# Patient Record
Sex: FEMALE | Race: BLACK OR AFRICAN AMERICAN | Employment: OTHER | RURAL
[De-identification: names, ages, dates, MRNs, and addresses within clinical notes are randomized per-mention and may not be internally consistent; named-entity substitution may affect disease eponyms.]

---

## 2017-01-31 DIAGNOSIS — I10 ESSENTIAL HYPERTENSION: ICD-10-CM

## 2017-01-31 RX ORDER — LISINOPRIL AND HYDROCHLOROTHIAZIDE 20; 25 MG/1; MG/1
1 TABLET ORAL DAILY
Qty: 90 TAB | Refills: 6 | Status: CANCELLED | OUTPATIENT
Start: 2017-01-31

## 2017-01-31 RX ORDER — LISINOPRIL AND HYDROCHLOROTHIAZIDE 20; 25 MG/1; MG/1
1 TABLET ORAL DAILY
Qty: 90 TAB | Refills: 1 | Status: SHIPPED | OUTPATIENT
Start: 2017-01-31 | End: 2017-08-22 | Stop reason: SDUPTHER

## 2017-01-31 NOTE — TELEPHONE ENCOUNTER
Can we get her an appt to f/u chronic conditions? She hasn't had cholesterol checked in over a year. Thanks!

## 2017-08-22 ENCOUNTER — OFFICE VISIT (OUTPATIENT)
Dept: FAMILY MEDICINE CLINIC | Age: 62
End: 2017-08-22

## 2017-08-22 VITALS
HEART RATE: 78 BPM | TEMPERATURE: 98 F | HEIGHT: 64 IN | RESPIRATION RATE: 20 BRPM | DIASTOLIC BLOOD PRESSURE: 78 MMHG | SYSTOLIC BLOOD PRESSURE: 129 MMHG | BODY MASS INDEX: 34.66 KG/M2 | OXYGEN SATURATION: 96 % | WEIGHT: 203 LBS

## 2017-08-22 DIAGNOSIS — M25.522 LEFT ELBOW PAIN: ICD-10-CM

## 2017-08-22 DIAGNOSIS — I10 ESSENTIAL HYPERTENSION: ICD-10-CM

## 2017-08-22 DIAGNOSIS — Z91.09 ENVIRONMENTAL ALLERGIES: Primary | ICD-10-CM

## 2017-08-22 RX ORDER — LISINOPRIL AND HYDROCHLOROTHIAZIDE 20; 25 MG/1; MG/1
1 TABLET ORAL DAILY
Qty: 90 TAB | Refills: 1 | Status: SHIPPED | OUTPATIENT
Start: 2017-08-22 | End: 2019-04-16 | Stop reason: SDUPTHER

## 2017-08-22 NOTE — MR AVS SNAPSHOT
Visit Information Date & Time Provider Department Dept. Phone Encounter #  
 8/22/2017 10:30 AM Marry Abel MD Thong Johnson [id number] Follow-up Instructions Return in about 6 months (around 2/22/2018) for HTN. Upcoming Health Maintenance Date Due Pneumococcal 19-64 Medium Risk (1 of 1 - PPSV23) 4/11/1973 DTaP/Tdap/Td series (1 - Tdap) 4/11/1975 ZOSTER VACCINE AGE 60> 2/11/2014 INFLUENZA AGE 9 TO ADULT 8/1/2017 BREAST CANCER SCRN MAMMOGRAM 5/24/2018 COLONOSCOPY 7/31/2022 Allergies as of 8/22/2017  Review Complete On: 8/22/2017 By: Jeremias Heath LPN Severity Noted Reaction Type Reactions Percocet [Oxycodone-acetaminophen]  02/14/2006    Other (comments)  
 felt like \"out of body experience\" Percodan [Oxycodone Hcl-oxycodone-asa]  05/11/2010    Other (comments)  
 felt like \"out of body experience\" Current Immunizations  Reviewed on 1/6/2015 Name Date Influenza Vaccine (Quad) PF 12/1/2015, 12/5/2014 Influenza Vaccine Split 10/26/2012, 10/31/2011, 1/24/2011 Not reviewed this visit You Were Diagnosed With   
  
 Codes Comments Environmental allergies    -  Primary ICD-10-CM: Z91.09 
ICD-9-CM: V15.09 Left elbow pain     ICD-10-CM: Q70.397 ICD-9-CM: 719.42 Essential hypertension     ICD-10-CM: I10 
ICD-9-CM: 401.9 Vitals BP Pulse Temp Resp Height(growth percentile) Weight(growth percentile) 129/78 (BP 1 Location: Left arm, BP Patient Position: Sitting) 78 98 °F (36.7 °C) (Oral) 20 5' 4\" (1.626 m) 203 lb (92.1 kg) LMP SpO2 BMI OB Status Smoking Status 01/01/1975 96% 34.84 kg/m2 Hysterectomy Former Smoker Vitals History BMI and BSA Data Body Mass Index Body Surface Area 34.84 kg/m 2 2.04 m 2 Preferred Pharmacy Pharmacy Name Phone [facility] [address] [place] [address] Wall  [phone] Your Updated Medication List  
  
   
This list is accurate as of: 8/22/17 10:52 AM.  Always use your most recent med list.  
  
  
  
  
 * albuterol 2.5 mg /3 mL (0.083 %) nebulizer solution Commonly known as:  PROVENTIL VENTOLIN  
3 mL by Nebulization route every four (4) hours as needed for Wheezing. * albuterol 90 mcg/actuation inhaler Commonly known as:  PROAIR HFA Take 1 Puff by inhalation every four (4) hours as needed. fluticasone 27.5 mcg/actuation nasal spray Commonly known as:  VERAMYST  
2 Sprays by Nasal route daily. * gabapentin 300 mg capsule Commonly known as:  NEURONTIN Take 300 mg by mouth two (2) times a day. * gabapentin 100 mg capsule Commonly known as:  NEURONTIN Take  by mouth three (3) times daily. lisinopril-hydroCHLOROthiazide 20-25 mg per tablet Commonly known as:  Amanda Manuel Take 1 Tab by mouth daily. loratadine 10 mg tablet Commonly known as:  [place] Take 1 tablet by mouth daily. MOBIC 15 mg tablet Generic drug:  meloxicam  
Take 15 mg by mouth daily. Nebulizer & Compressor machine  
by Does Not Apply route. q4 hours with medication  
  
 sodium chloride 0.65 % Drop Commonly known as:  AYR SALINE  
2 Drops by Both Nostrils route every two (2) hours as needed. Use before flonase  
  
 traMADol 50 mg tablet Commonly known as:  ULTRAM  
Take 50 mg by mouth every six (6) hours as needed for Pain. VITAMIN B-12 500 mcg tablet Generic drug:  cyanocobalamin Take 500 mcg by mouth daily. vitamin E 400 unit capsule Commonly known as:  [address] Take  by mouth daily. * Notice: This list has 4 medication(s) that are the same as other medications prescribed for you. Read the directions carefully, and ask your doctor or other care provider to review them with you. Prescriptions Sent to Pharmacy  Refills  
 sodium chloride (AYR SALINE) 0.65 % drop 0  
 Si Drops by Both Nostrils route every two (2) hours as needed. Use before flonase Class: Normal  
 Pharmacy: [id number] [place] [address] Ph #: [phone] Route: Both Nostrils  
 lisinopril-hydroCHLOROthiazide (PRINZIDE, ZESTORETIC) 20-25 mg per tablet 1 Sig: Take 1 Tab by mouth daily. Class: Normal  
 Pharmacy: [id number] [place] [address] Ph #: [phone] Route: Oral  
  
We Performed the Following METABOLIC PANEL, BASIC [57510 CPT(R)] Follow-up Instructions Return in about 6 months (around 2018) for HTN. Patient Instructions A Healthy Lifestyle: Care Instructions Your Care Instructions A healthy lifestyle can help you feel good, stay at a healthy weight, and have plenty of energy for both work and play. A healthy lifestyle is something you can share with your whole family. A healthy lifestyle also can lower your risk for serious health problems, such as high blood pressure, heart disease, and diabetes. You can follow a few steps listed below to improve your health and the health of your family. Follow-up care is a key part of your treatment and safety. Be sure to make and go to all appointments, and call your doctor if you are having problems. Its also a good idea to know your test results and keep a list of the medicines you take. How can you care for yourself at home? · Do not eat too much sugar, fat, or fast foods. You can still have dessert and treats now and then. The goal is moderation. · Start small to improve your eating habits. Pay attention to portion sizes, drink less juice and soda pop, and eat more fruits and vegetables. ¨ Eat a healthy amount of food. A 3-ounce serving of meat, for example, is about the size of a deck of cards. Fill the rest of your plate with vegetables and whole grains. ¨ Limit the amount of soda and sports drinks you have every day.  Drink more water when you are thirsty. ¨ Eat at least 5 servings of fruits and vegetables every day. It may seem like a lot, but it is not hard to reach this goal. A serving or helping is 1 piece of fruit, 1 cup of vegetables, or 2 cups of leafy, raw vegetables. Have an apple or some carrot sticks as an afternoon snack instead of a candy bar. Try to have fruits and/or vegetables at every meal. 
· Make exercise part of your daily routine. You may want to start with simple activities, such as walking, bicycling, or slow swimming. Try to be active 30 to 60 minutes every day. You do not need to do all 30 to 60 minutes all at once. For example, you can exercise 3 times a day for 10 or 20 minutes. Moderate exercise is safe for most people, but it is always a good idea to talk to your doctor before starting an exercise program. 
· Keep moving. Al Mcdonough the lawn, work in the garden, or [employer] Take the stairs instead of the elevator at work. · If you smoke, quit. People who smoke have an increased risk for heart attack, stroke, cancer, and other lung illnesses. Quitting is hard, but there are ways to boost your chance of quitting tobacco for good. ¨ Use nicotine gum, patches, or lozenges. ¨ Ask your doctor about stop-smoking programs and medicines. ¨ Keep trying. In addition to reducing your risk of diseases in the future, you will notice some benefits soon after you stop using tobacco. If you have shortness of breath or asthma symptoms, they will likely get better within a few weeks after you quit. · Limit how much alcohol you drink. Moderate amounts of alcohol (up to 2 drinks a day for men, 1 drink a day for women) are okay. But drinking too much can lead to liver problems, high blood pressure, and other health problems. Family health If you have a family, there are many things you can do together to improve your health. · Eat meals together as a family as often as possible. · Eat healthy foods. This includes fruits, vegetables, lean meats and dairy, and whole grains. · Include your family in your fitness plan. Most people think of activities such as jogging or tennis as the way to fitness, but there are many ways you and your family can be more active. Anything that makes you breathe hard and gets your heart pumping is exercise. Here are some tips: 
¨ Walk to do errands or to take your child to school or the bus. ¨ Go for a family bike ride after dinner instead of watching TV. Where can you learn more? Go to http://rafael-vern.info/. Enter U210 in the search box to learn more about \"A Healthy Lifestyle: Care Instructions. \" Current as of: July 26, 2016 Content Version: 11.3 © 3851-7266 [employer]. Care instructions adapted under license by [employer] (which disclaims liability or warranty for this information). If you have questions about a medical condition or this instruction, always ask your healthcare professional. [place] [address] any warranty or liability for your use of this information. Introducing [facility]! Dear April: 
Thank you for requesting a Kynetx account. Our records indicate that you have previously registered for a Kynetx account but its currently inactive. Please call our Kynetx support line at [phone]. Additional Information If you have questions, please visit the Frequently Asked Questions section of the Kynetx website at [email or web address] Mega [email or web address]. Play [email or web address] Remember, Kynetx is NOT to be used for urgent needs. For medical emergencies, dial 911. Now available from your iPhone and Android! Please provide this summary of care documentation to your next provider. Your primary care clinician is listed as [address] If you have any questions after today's visit, please call [phone].

## 2017-08-22 NOTE — PROGRESS NOTES
Reviewed record in preparation for visit and have obtained necessary documentation. Patient did not bring medications to visit for review. Information provided on Advanced Directive, Living Will. Body mass index is 34.84 kg/(m^2).    Health Maintenance Due   Topic Date Due    Pneumococcal 19-64 Medium Risk (1 of 1 - PPSV23) 04/11/1973    DTaP/Tdap/Td series (1 - Tdap) 04/11/1975    ZOSTER VACCINE AGE 60>  02/11/2014    INFLUENZA AGE 9 TO ADULT  08/01/2017

## 2017-08-22 NOTE — PATIENT INSTRUCTIONS

## 2019-01-09 ENCOUNTER — OFFICE VISIT (OUTPATIENT)
Dept: FAMILY MEDICINE CLINIC | Age: 64
End: 2019-01-09

## 2019-01-09 VITALS
SYSTOLIC BLOOD PRESSURE: 133 MMHG | OXYGEN SATURATION: 95 % | HEART RATE: 65 BPM | DIASTOLIC BLOOD PRESSURE: 83 MMHG | BODY MASS INDEX: 34.15 KG/M2 | WEIGHT: 200 LBS | HEIGHT: 64 IN | TEMPERATURE: 98.4 F | RESPIRATION RATE: 16 BRPM

## 2019-01-09 DIAGNOSIS — F43.21 GRIEF AT LOSS OF CHILD: ICD-10-CM

## 2019-01-09 DIAGNOSIS — B37.9 CANDIDIASIS: ICD-10-CM

## 2019-01-09 DIAGNOSIS — Z63.4 GRIEF AT LOSS OF CHILD: ICD-10-CM

## 2019-01-09 DIAGNOSIS — J45.20 MILD INTERMITTENT ASTHMA WITHOUT COMPLICATION: ICD-10-CM

## 2019-01-09 DIAGNOSIS — I10 ESSENTIAL HYPERTENSION: ICD-10-CM

## 2019-01-09 DIAGNOSIS — R73.01 IMPAIRED FASTING GLUCOSE: ICD-10-CM

## 2019-01-09 DIAGNOSIS — J01.01 ACUTE RECURRENT MAXILLARY SINUSITIS: Primary | ICD-10-CM

## 2019-01-09 RX ORDER — GLIMEPIRIDE 1 MG/1
TABLET ORAL
COMMUNITY
End: 2019-04-16 | Stop reason: SDUPTHER

## 2019-01-09 RX ORDER — NYSTATIN 100000 U/G
OINTMENT TOPICAL 2 TIMES DAILY
Qty: 15 G | Refills: 0 | Status: SHIPPED | OUTPATIENT
Start: 2019-01-09 | End: 2019-06-25 | Stop reason: ALTCHOICE

## 2019-01-09 RX ORDER — AMOXICILLIN AND CLAVULANATE POTASSIUM 875; 125 MG/1; MG/1
1 TABLET, FILM COATED ORAL EVERY 12 HOURS
Qty: 14 TAB | Refills: 0 | Status: SHIPPED | OUTPATIENT
Start: 2019-01-09 | End: 2019-01-16

## 2019-01-09 SDOH — SOCIAL STABILITY - SOCIAL INSECURITY: DISSAPEARANCE AND DEATH OF FAMILY MEMBER: Z63.4

## 2019-01-09 NOTE — PROGRESS NOTES
1. Have you been to the ER, urgent care clinic, or been hospitalized since your last visit? No     2. Have you seen or consulted any other health care providers outside of the [address] [place] [address] since your last visit?   NO     Reviewed record in preparation for visit and have necessary documentation  opportunity was given for questions  Goals that were addressed and/or need to be completed during or after this appointment include     Health Maintenance Due   Topic Date Due    Pneumococcal 19-64 Medium Risk (1 of 1 - PPSV23) 04/11/1973    DTaP/Tdap/Td series (1 - Tdap) 04/11/1975    Shingrix Vaccine Age 50> (1 of 2) 04/11/2004    BREAST CANCER SCRN MAMMOGRAM  05/24/2018    Influenza Age 9 to Adult  08/01/2018     Mammogram done at [facility]

## 2019-01-09 NOTE — PATIENT INSTRUCTIONS
Sinusitis: Care Instructions  Your Care Instructions    Sinusitis is an infection of the lining of the sinus cavities in your head. Sinusitis often follows a cold. It causes pain and pressure in your head and face. In most cases, sinusitis gets better on its own in 1 to 2 weeks. But some mild symptoms may last for several weeks. Sometimes antibiotics are needed. Follow-up care is a key part of your treatment and safety. Be sure to make and go to all appointments, and call your doctor if you are having problems. It's also a good idea to know your test results and keep a list of the medicines you take. How can you care for yourself at home? · Take an over-the-counter pain medicine, such as acetaminophen (Tylenol), ibuprofen (Advil, Motrin), or naproxen (Aleve). Read and follow all instructions on the label. · If the doctor prescribed antibiotics, take them as directed. Do not stop taking them just because you feel better. You need to take the full course of antibiotics. · Be careful when taking over-the-counter cold or flu medicines and Tylenol at the same time. Many of these medicines have acetaminophen, which is Tylenol. Read the labels to make sure that you are not taking more than the recommended dose. Too much acetaminophen (Tylenol) can be harmful. · Breathe warm, moist air from a steamy shower, a hot bath, or a sink filled with hot water. Avoid cold, dry air. Using a humidifier in your home may help. Follow the directions for cleaning the machine. · Use saline (saltwater) nasal washes to help keep your nasal passages open and wash out mucus and bacteria. You can buy saline nose drops at a grocery store or drugstore. Or you can make your own at home by adding 1 teaspoon of salt and 1 teaspoon of baking soda to 2 cups of distilled water. If you make your own, fill a bulb syringe with the solution, insert the tip into your nostril, and squeeze gently. Aubery Neat your nose.   · Put a hot, wet towel or a warm gel pack on your face 3 or 4 times a day for 5 to 10 minutes each time. · Try a decongestant nasal spray like oxymetazoline (Afrin). Do not use it for more than 3 days in a row. Using it for more than 3 days can make your congestion worse. When should you call for help? Call your doctor now or seek immediate medical care if:    · You have new or worse swelling or redness in your face or around your eyes.     · You have a new or higher fever.    Watch closely for changes in your health, and be sure to contact your doctor if:    · You have new or worse facial pain.     · The mucus from your nose becomes thicker (like pus) or has new blood in it.     · You are not getting better as expected. Where can you learn more? Go to http://rafael-vern.info/. Enter R990 in the search box to learn more about \"Sinusitis: Care Instructions. \"  Current as of: March 28, 2018  Content Version: 11.8  © 9085-7127 Healthwise, Incorporated. Care instructions adapted under license by [employer] (which disclaims liability or warranty for this information). If you have questions about a medical condition or this instruction, always ask your healthcare professional. [address] any warranty or liability for your use of this information.

## 2019-01-09 NOTE — PROGRESS NOTES
Lena Horan  58 y.o. female  1955  [address] [place] [address]  [id number]     [facility]: Progress Note       Encounter Date: 2019    Chief Complaint   Patient presents with    Cold Symptoms     stuffy nose, headache, drainage x1 month    Other     Re-establishing as patient    Rash     in abdominal folds        History provided by patient  History of Present Illness   Lena Velez is a 58 y.o. female who presents to clinic today for:      [address] [place] [address] patient who presents to establish PCP care. I personally reviewed and updated the patient's medical, surgical, family and social history. Annie Castellano and [facility]  Dr. Robles Reason. Patient decided to come to [facility] due to insurance change. RECORDS  Provided by patient: None from PCP  Also sees:  Neurology in [facility]  Dr. Pete Rojas for orthopedics    [facility]  Patient Care Team:  Unknown, Provider as PCP - General    Cold symptoms  Patient present with cc of cold symptoms x 1 months. Symptoms include headache, nasal congestion, and teeth hurt. Denies fever or chills. Had tried some OTC remedies with symptom relief. Grief  Patient present with cc of grief. Patient's youngest son  in December. He was stuck by a car while walking home. Patient reports that she feels like grief is finally catching up to her as his birthday is also coming in 2 weeks. [de-identified] of her family is in [place]. She has not been to see a counselor. IFG Follow up: Improved   Overall the patient feels she was started on metformin several months ago after sugar was elevated on routine screening but was unable to tolerate S/E. Review of Systems   Review of Systems   Constitutional: Negative for chills, fever and weight loss. HENT: Positive for congestion, ear pain and sinus pain. Negative for ear discharge. Respiratory: Negative for cough, sputum production and shortness of breath.     Cardiovascular: Negative for chest pain, palpitations and leg swelling. Gastrointestinal: Negative for abdominal pain, diarrhea, nausea and vomiting. Musculoskeletal: Negative for falls, joint pain and myalgias. Skin: Negative for rash. Neurological: Positive for headaches. Negative for dizziness. Psychiatric/Behavioral: Positive for depression. Negative for hallucinations, substance abuse and suicidal ideas. The patient is not nervous/anxious. Vitals/Objective:     Vitals:    01/09/19 0844   BP: 133/83   Pulse: 65   Resp: 16   Temp: 98.4 °F (36.9 °C)   TempSrc: Oral   SpO2: 95%   Weight: 200 lb (90.7 kg)   Height: 5' 4\" (1.626 m)     Body mass index is 34.33 kg/m². Wt Readings from Last 3 Encounters:   01/09/19 200 lb (90.7 kg)   08/22/17 203 lb (92.1 kg)   12/12/16 204 lb (92.5 kg)       Physical Exam   Constitutional: She is oriented to person, place, and time. She appears well-developed and well-nourished. HENT:   Head: Normocephalic and atraumatic. Right Ear: Tympanic membrane, external ear and ear canal normal.   Left Ear: Tympanic membrane, external ear and ear canal normal.   Nose: Rhinorrhea present. Right sinus exhibits maxillary sinus tenderness and frontal sinus tenderness. Left sinus exhibits maxillary sinus tenderness and frontal sinus tenderness. Mouth/Throat: Posterior oropharyngeal edema present. No oropharyngeal exudate or posterior oropharyngeal erythema. Eyes: Right eye exhibits no discharge. Left eye exhibits no discharge. No scleral icterus. Cardiovascular: Normal rate and regular rhythm. Pulmonary/Chest: Effort normal and breath sounds normal.   Neurological: She is alert and oriented to person, place, and time. Skin: Skin is warm. Rash (under pannus) noted. No results found for this or any previous visit (from the past 24 hour(s)). Assessment and Plan:     Encounter Diagnoses     ICD-10-CM ICD-9-CM   1. Acute recurrent maxillary sinusitis J01.01 461.0   2.  Grief at loss of child F43.21 309.0    Z63.4    3. Candidiasis B37.9 112.9   4. Essential hypertension I10 401.9   5. Mild intermittent asthma without complication K05.77 528.28   6. Impaired fasting glucose R73.01 790.21       1. Acute recurrent maxillary sinusitis  - amoxicillin-clavulanate (AUGMENTIN) 875-125 mg per tablet; Take 1 Tab by mouth every twelve (12) hours for 7 days. Dispense: 14 Tab; Refill: 0    2. Grief at loss of child  Giving information to counselor on AVS    3. Candidiasis  - nystatin (MYCOSTATIN) 100,000 unit/gram ointment; Apply  to affected area two (2) times a day. Dispense: 15 g; Refill: 0    4. Essential hypertension  Well controlled. - METABOLIC PANEL, BASIC    5. Mild intermittent asthma without complication  No issues at present    6. Impaired fasting glucose  On glipizide. Awaiting records if see if she met criteria for DM. Recheck blood work today.   - HEMOGLOBIN A1C WITH EAG  - LIPID PANEL      I have discussed the diagnosis with the patient and the intended plan as seen in the above orders. she has expressed understanding. The patient has received an after-visit summary and questions were answered concerning future plans. I have discussed medication side effects and warnings with the patient as well. Electronically Signed: Nany Cuello MD     History/Allergies   Patients past medical, surgical and family histories were reviewed and updated.     Past Medical History:   Diagnosis Date    Asthma     Breast cancer ([facility] [address]     Hx of seasonal allergies     Hypertension       Past Surgical History:   Procedure Laterality Date    BIOPSY BREAST      X3    BREAST SURGERY PROCEDURE UNLISTED      bilateral breast lumpectomy    HX HYSTERECTOMY      HX TONSILLECTOMY       Family History   Problem Relation Age of Onset    Hypertension Mother     Hypertension Father     Dementia Father     Stroke Father     Diabetes Sister      Social History     Socioeconomic History    Marital status:      Spouse name: Not on file    Number of children: Not on file    Years of education: Not on file    Highest education level: Not on file   Social Needs    Financial resource strain: Not on file    Food insecurity - worry: Not on file    Food insecurity - inability: Not on file    Transportation needs - medical: Not on file   Tap[employer]ield needs - non-medical: Not on file   Occupational History    Not on file   Tobacco Use    Smoking status: Former Smoker     Last attempt to quit: 1974     Years since quittin.3    Smokeless tobacco: Never Used   Substance and Sexual Activity    Alcohol use: Yes     Comment: infrequent    Drug use: No    Sexual activity: No   Other Topics Concern    Not on file   Social History Narrative    Not on file         Allergies   Allergen Reactions    Percocet [Oxycodone-Acetaminophen] Other (comments)     felt like \"out of body experience\"    Percodan [Oxycodone Hcl-Oxycodone-Asa] Other (comments)     felt like \"out of body experience\"       Disposition     Follow-up Disposition:  Return in about 4 weeks (around 2019) for f/u mood. .    Future Appointments   Date Time Provider Lisa Hoyos   2019  8:50 AM Priscila Munoz MD [facility] SHIRA SCHED            Current Medications after this visit     Current Outpatient Medications   Medication Sig    glimepiride (AMARYL) 1 mg tablet Take  by mouth Daily (before breakfast).  multivit with min-folic acid (WOMEN'S MULTIVITAMIN GUMMIES) 200 mcg chew Take  by mouth.  Lactobacillus acidophilus (PROBIOTIC PO) Take  by mouth.  amoxicillin-clavulanate (AUGMENTIN) 875-125 mg per tablet Take 1 Tab by mouth every twelve (12) hours for 7 days.  nystatin (MYCOSTATIN) 100,000 unit/gram ointment Apply  to affected area two (2) times a day.  lisinopril-hydroCHLOROthiazide (PRINZIDE, ZESTORETIC) 20-25 mg per tablet Take 1 Tab by mouth daily.     gabapentin (NEURONTIN) 100 mg capsule Take  by mouth daily.  traMADol (ULTRAM) 50 mg tablet Take 50 mg by mouth every six (6) hours as needed for Pain.  meloxicam (MOBIC) 15 mg tablet Take 15 mg by mouth daily.  cyanocobalamin (VITAMIN B-12) 500 mcg tablet Take 500 mcg by mouth daily.  vitamin E (AQUA GEMS) 400 unit capsule Take  by mouth daily.  fluticasone (VERAMYST) 27.5 mcg/actuation nasal spray 2 Sprays by Nasal route daily.  gabapentin (NEURONTIN) 300 mg capsule Take 300 mg by mouth nightly.  albuterol (PROAIR HFA) 90 mcg/actuation inhaler Take 1 Puff by inhalation every four (4) hours as needed.  loratadine (CLARITIN) 10 mg tablet Take 1 tablet by mouth daily.  albuterol (PROVENTIL VENTOLIN) 2.5 mg /3 mL (0.083 %) nebulizer solution 3 mL by Nebulization route every four (4) hours as needed for Wheezing.  Nebulizer & Compressor machine by Does Not Apply route. q4 hours with medication    sodium chloride (AYR SALINE) 0.65 % drop 2 Drops by Both Nostrils route every two (2) hours as needed. Use before flonase     No current facility-administered medications for this visit. There are no discontinued medications.

## 2019-01-10 PROBLEM — E11.8 CONTROLLED TYPE 2 DIABETES MELLITUS WITH COMPLICATION, WITHOUT LONG-TERM CURRENT USE OF INSULIN (HCC): Status: ACTIVE | Noted: 2019-01-10

## 2019-01-10 LAB
BUN SERPL-MCNC: 20 MG/DL (ref 8–27)
BUN/CREAT SERPL: 20 (ref 12–28)
CALCIUM SERPL-MCNC: 10 MG/DL (ref 8.7–10.3)
CHLORIDE SERPL-SCNC: 102 MMOL/L (ref 96–106)
CHOLEST SERPL-MCNC: 183 MG/DL (ref 100–199)
CO2 SERPL-SCNC: 27 MMOL/L (ref 20–29)
CREAT SERPL-MCNC: 0.99 MG/DL (ref 0.57–1)
EST. AVERAGE GLUCOSE BLD GHB EST-MCNC: 154 MG/DL
GLUCOSE SERPL-MCNC: 117 MG/DL (ref 65–99)
HBA1C MFR BLD: 7 % (ref 4.8–5.6)
HDLC SERPL-MCNC: 58 MG/DL
LDLC SERPL CALC-MCNC: 107 MG/DL (ref 0–99)
POTASSIUM SERPL-SCNC: 4.8 MMOL/L (ref 3.5–5.2)
SODIUM SERPL-SCNC: 142 MMOL/L (ref 134–144)
TRIGL SERPL-MCNC: 88 MG/DL (ref 0–149)
VLDLC SERPL CALC-MCNC: 18 MG/DL (ref 5–40)

## 2019-04-10 ENCOUNTER — OFFICE VISIT (OUTPATIENT)
Dept: FAMILY MEDICINE CLINIC | Age: 64
End: 2019-04-10

## 2019-04-10 ENCOUNTER — TELEPHONE (OUTPATIENT)
Dept: FAMILY MEDICINE CLINIC | Age: 64
End: 2019-04-10

## 2019-04-10 VITALS
OXYGEN SATURATION: 96 % | RESPIRATION RATE: 20 BRPM | TEMPERATURE: 97.9 F | BODY MASS INDEX: 34.18 KG/M2 | WEIGHT: 200.2 LBS | SYSTOLIC BLOOD PRESSURE: 120 MMHG | HEART RATE: 74 BPM | DIASTOLIC BLOOD PRESSURE: 74 MMHG | HEIGHT: 64 IN

## 2019-04-10 DIAGNOSIS — R10.33 PERIUMBILICAL ABDOMINAL PAIN: Primary | ICD-10-CM

## 2019-04-10 DIAGNOSIS — Z91.09 ENVIRONMENTAL ALLERGIES: ICD-10-CM

## 2019-04-10 DIAGNOSIS — R68.89 ITCHY EYES: ICD-10-CM

## 2019-04-10 NOTE — PROGRESS NOTES
April D   58 y.o. female  1955  [address] [place] [address]  [id number]     [facility]: Progress Note       Encounter Date: 4/10/2019    Chief Complaint   Patient presents with    Abdominal Pain     upset for a couple of weeks    Eye Pain     left, blurry at times and watery, going on for about couple of weeks     History of Present Illness   April D Georgette Sahh is a 58 y.o. female who presents to clinic today for:    Abdominal pain- last ~2 weeks worsening pain that is in her \"stomach and radiating to her back\". Passing gas. Used to have 2-3 bowel movements daily and now only having 1. Denies-blood, mucus in stool, diarrhea, h/a, sweats, N, V. Tolerating fluids. Decreased appetite. No known exposure to any viruses. Denies eating anything out of her norm. Denies abnormal vaginal bleeding; hx of hysterectomy. Patient states she is grieving the loss of her son; she tries to stay positive but recently it has been a challenge. She is completing the process of closing out his estate which has been stressful. Bilateral eye itch/watery right>left- for the past couple of weeks noted. Treatment-cold compress helps. Mild blurry vision that corrects itself with blinking. Hx of environmental allergies. Health Maintenance    Health Maintenance Due   Topic Date Due    Pneumococcal 0-64 years (1 of 1 - PPSV23) 04/11/1960    FOOT EXAM Q1  04/11/1964    MICROALBUMIN Q1  04/11/1964    EYE EXAM RETINAL OR DILATED  04/11/1964    DTaP/Tdap/Td series (1 - Tdap) 04/11/1975    Shingrix Vaccine Age 50> (1 of 2) 04/11/2004    MEDICARE YEARLY EXAM  01/09/2019    Bone Densitometry (Dexa) Screening  04/11/2019     Review of Systems   Review of Systems   Constitutional: Negative. HENT: Negative. Eyes: Positive for blurred vision and discharge. Respiratory: Negative. Cardiovascular: Negative. Gastrointestinal: Positive for abdominal pain. Genitourinary: Negative.     Musculoskeletal: Negative. Skin: Negative. Neurological: Negative. Endo/Heme/Allergies: Negative. Psychiatric/Behavioral: Negative. Vitals/Objective:     Vitals:    04/10/19 1007   BP: 120/74   Pulse: 74   Resp: 20   Temp: 97.9 °F (36.6 °C)   TempSrc: Oral   SpO2: 96%   Weight: 200 lb 3.2 oz (90.8 kg)   Height: 5' 4\" (1.626 m)     Body mass index is 34.36 kg/m². Physical Exam   Constitutional: She is oriented to person, place, and time. She is cooperative. HENT:   Head: Normocephalic. Nose: Nose normal.   Mouth/Throat: Uvula is midline, oropharynx is clear and moist and mucous membranes are normal.   Eyes: Pupils are equal, round, and reactive to light. Conjunctivae, EOM and lids are normal. Right eye exhibits no discharge and no hordeolum. Left eye exhibits no discharge and no hordeolum. No scleral icterus. Normal exam and negative for drainage or globe pain. Neck: Trachea normal, normal range of motion, full passive range of motion without pain and phonation normal. Neck supple. No thyroid mass and no thyromegaly present. Cardiovascular: Normal rate, regular rhythm, normal heart sounds and normal pulses. Pulmonary/Chest: Effort normal and breath sounds normal.   Abdominal: Soft. Normal appearance and bowel sounds are normal. She exhibits no distension, no pulsatile liver, no fluid wave, no ascites and no mass. There is no hepatosplenomegaly. There is tenderness. There is no rebound and no CVA tenderness. Lymphadenopathy:     She has no cervical adenopathy. Neurological: She is alert and oriented to person, place, and time. Skin: Skin is warm, dry and intact. Psychiatric: She has a normal mood and affect. Her speech is normal and behavior is normal. Judgment and thought content normal. Cognition and memory are normal.       No results found for this or any previous visit (from the past 24 hour(s)). Assessment and Plan:   1. Periumbilical abdominal pain    - XR ABD (KUB); Future    2. Itchy eyes      3. Environmental allergies    KUB normal and lab letter sent. Left a message on patient's vmail-? Stress or ?psychological-would encourage her to continue pushing fluids and slowly advancing her diet as tolerated. Strict ED parameters for worsening symptoms. Discussed continue to take allergy medication and for now continue cool compress on her eyes as needed. Did not medically see a need to prescribe any gtts/medication at this time due to normal exam.    I have discussed the diagnosis with the patient and the intended plan as seen in the above orders. she has expressed understanding. The patient has received an after-visit summary and questions were answered concerning future plans. I have discussed medication side effects and warnings with the patient as well. Electronically Signed: Lily Villalba NP     History/Allergies   Patients past medical, surgical and family histories were reviewed and updated.     Past Medical History:   Diagnosis Date    Asthma     Breast cancer ([facility] [address]     Hx of seasonal allergies     Hypertension       Past Surgical History:   Procedure Laterality Date    BIOPSY BREAST      X3    BREAST SURGERY PROCEDURE UNLISTED      bilateral breast lumpectomy    HX HYSTERECTOMY      HX TONSILLECTOMY       Family History   Problem Relation Age of Onset    Hypertension Mother     Hypertension Father     Dementia Father     Stroke Father     Diabetes Sister      Social History     Socioeconomic History    Marital status:      Spouse name: Not on file    Number of children: Not on file    Years of education: Not on file    Highest education level: Not on file   Occupational History    Not on file   Social Needs    Financial resource strain: Not on file    Food insecurity:     Worry: Not on file     Inability: Not on file    Transportation needs:     Medical: Not on file     Non-medical: Not on file   Tobacco Use    Smoking status: Former Smoker     Last attempt to quit: 1974     Years since quittin.6    Smokeless tobacco: Never Used   Substance and Sexual Activity    Alcohol use: Yes     Comment: infrequent    Drug use: No    Sexual activity: Never   Lifestyle    Physical activity:     Days per week: Not on file     Minutes per session: Not on file    Stress: Not on file   Relationships    Social connections:     Talks on phone: Not on file     Gets together: Not on file     Attends [employer] service: Not on file     Active member of club or organization: Not on file     Attends meetings of clubs or organizations: Not on file     Relationship status: Not on file    Intimate partner violence:     Fear of current or ex partner: Not on file     Emotionally abused: Not on file     Physically abused: Not on file     Forced sexual activity: Not on file   Other Topics Concern    Not on file   Social History Narrative    Not on file         Allergies   Allergen Reactions    Percocet [Oxycodone-Acetaminophen] Other (comments)     felt like \"out of body experience\"    Percodan [Oxycodone Hcl-Oxycodone-Asa] Other (comments)     felt like \"out of body experience\"       Disposition     Follow-up and Dispositions  ·   Return if symptoms worsen or fail to improve. No future appointments. Current Medications after this visit     Current Outpatient Medications   Medication Sig    glimepiride (AMARYL) 1 mg tablet Take  by mouth Daily (before breakfast).  multivit with min-folic acid (WOMEN'S MULTIVITAMIN GUMMIES) 200 mcg chew Take  by mouth.  Lactobacillus acidophilus (PROBIOTIC PO) Take  by mouth.  nystatin (MYCOSTATIN) 100,000 unit/gram ointment Apply  to affected area two (2) times a day.  lisinopril-hydroCHLOROthiazide (PRINZIDE, ZESTORETIC) 20-25 mg per tablet Take 1 Tab by mouth daily.  gabapentin (NEURONTIN) 100 mg capsule Take  by mouth daily.     traMADol (ULTRAM) 50 mg tablet Take 50 mg by mouth every six (6) hours as needed for Pain.  meloxicam (MOBIC) 15 mg tablet Take 15 mg by mouth daily.  gabapentin (NEURONTIN) 300 mg capsule Take 300 mg by mouth nightly.  albuterol (PROAIR HFA) 90 mcg/actuation inhaler Take 1 Puff by inhalation every four (4) hours as needed.  loratadine (CLARITIN) 10 mg tablet Take 1 tablet by mouth daily.  albuterol (PROVENTIL VENTOLIN) 2.5 mg /3 mL (0.083 %) nebulizer solution 3 mL by Nebulization route every four (4) hours as needed for Wheezing.  Nebulizer & Compressor machine by Does Not Apply route. q4 hours with medication     No current facility-administered medications for this visit.       Medications Discontinued During This Encounter   Medication Reason    sodium chloride (AYR SALINE) 0.65 % drop Therapy Completed    fluticasone (VERAMYST) 27.5 mcg/actuation nasal spray Therapy Completed    vitamin E (AQUA GEMS) 400 unit capsule Therapy Completed    cyanocobalamin (VITAMIN B-12) 500 mcg tablet Therapy Completed

## 2019-04-10 NOTE — PROGRESS NOTES
Chief Complaint   Patient presents with    Abdominal Pain     upset for a couple of weeks    Eye Pain     left, blurry at times and watery, going on for about couple of weeks     Visit Vitals  /74 (BP 1 Location: Right arm, BP Patient Position: Sitting)   Pulse 74   Temp 97.9 °F (36.6 °C) (Oral)   Resp 20   Ht 5' 4\" (1.626 m)   Wt 200 lb 3.2 oz (90.8 kg)   LMP 01/01/1975   SpO2 96%   BMI 34.36 kg/m²     1. Have you been to the ER, urgent care clinic since your last visit? Hospitalized since your last visit? No    2. Have you seen or consulted any other health care providers outside of the [address] [place] [address] since your last visit? Include any pap smears or colon screening.  No    Reviewed record in preparation for visit and have necessary documentation  Pt did not bring medication to office visit for review  opportunity was given for questions  Goals that were addressed and/or need to be completed during or after this appointment include   Health Maintenance Due   Topic Date Due    Pneumococcal 0-64 years (1 of 1 - PPSV23) 04/11/1960    FOOT EXAM Q1  04/11/1964    MICROALBUMIN Q1  04/11/1964    EYE EXAM RETINAL OR DILATED  04/11/1964    DTaP/Tdap/Td series (1 - Tdap) 04/11/1975    Shingrix Vaccine Age 50> (1 of 2) 04/11/2004    MEDICARE YEARLY EXAM  01/09/2019    Bone Densitometry (Dexa) Screening  04/11/2019

## 2019-04-10 NOTE — LETTER
4/10/2019 11:24 AM 
 
Ms. Lena Latham Grief Sally Thak[id number] [facility] [address] Dear Lena Horan: 
 
Please find your most recent results below. Resulted Orders XR ABD (KUB) (Exam End: 4/10/2019 10:45 AM) Narrative EXAM: KUB INDICATION: abdominal pain A single frontal view of the abdomen shows normal small bowel gas pattern. There 
is no significant stool. There are no significant calcifications. There is no 
apparent organomegaly. Impression IMPRESSION: Unremarkable KUB. RECOMMENDATIONS: 
 
Normal imaging. Please call me if you have any questions: [phone] Sincerely, James Alexander, BRITTANY

## 2019-04-10 NOTE — PATIENT INSTRUCTIONS
Eye Irritation in Children: Care Instructions  Your Care Instructions    Many children have minor eye problems. For example, your child's eyes may itch or feel irritated. Or your child's eyes may get tired from working too hard. This is called eyestrain. From time to time, irritated eyes may cause your child to have blurry vision. But they do not usually cause lasting problems with vision. Many things can cause these kinds of eye problems. If your child watches TV, plays video games, or uses the computer a lot, he or she may blink less than normal. This can cause dry, red, irritated eyes. Sometimes dry weather, smoke, or pollution can bother the eyes. Other times, allergies or contact lenses irritate the eyes. You can work with your doctor to find ways to help your child's eyes feel better. Home treatment often helps. Follow-up care is a key part of your child's treatment and safety. Be sure to make and go to all appointments, and call your doctor if your child is having problems. It's also a good idea to know your child's test results and keep a list of the medicines your child takes. How can you care for your child at home? · Have your child take breaks often when he or she reads, watches TV, or uses a computer. Tell your child to close his or her eyes and not to rub them. You may want to try artificial tears when your child does these activities. You can buy these without a prescription. · Avoid smoke and other things that irritate the eyes. · Have your child wear sunglasses that wrap around the sides of the head. These can protect the eyes from sun, wind, dust, and dirt. · Place a humidifier by your child's bed or close to your child. Follow the directions for cleaning the machine. · Do not use fans while your child sleeps. · If your child usually wears contact lenses, have him or her use rewetting drops or wear glasses until the eyes feel better. · Be safe with medicines.  Have your child take medicines exactly as prescribed. Call your doctor if you think your child is having a problem with his or her medicine. · Have your child use artificial tears at least 4 times a day. · If your child needs drops more than 4 times a day, use artificial tears without preservatives. They may irritate the eyes less. · Have your child use a lubricating eye ointment or eye gel at bedtime. These are thicker and last longer, so your child may have less burning, dryness, and itching when he or she wakes up. Be aware that they may blur vision for a short time. · To put in eyedrops or ointment:  ? Tilt your child's head back, and pull the lower eyelid down with one finger. ? Drop or squirt the medicine inside the lower lid. ? Close your child's eye for 30 to 60 seconds to let the drops or ointment move around. ? Do not touch the ointment or dropper tip to your eyelashes or any other surface. · Put a warm, moist cloth on your child's eyelids every morning for about 5 minutes. Then massage the eyelids lightly. This helps increase the natural wetness of the eyes. When should you call for help? Call your doctor now or seek immediate medical care if:    · Your child has eye pain.     · Your child has new blurred vision.    Watch closely for changes in your child's health, and be sure to contact your doctor if:    · Your child's eye has new redness.     · Your child's eye has a new discharge.     · Your child does not get better as expected. Where can you learn more? Go to http://rafael-vern.info/. Enter [phone] in the search box to learn more about \"Eye Irritation in Children: Care Instructions. \"  Current as of: September 23, 2018  Content Version: 11.9  © 1846-9862 [employer]. Care instructions adapted under license by [employer] (which disclaims liability or warranty for this information).  If you have questions about a medical condition or this instruction, always ask your healthcare professional. [address] any warranty or liability for your use of this information.

## 2019-04-10 NOTE — TELEPHONE ENCOUNTER
----- Message from Musa Stoddard sent at 4/10/2019  3:34 PM EDT -----  Regarding: Brook Key - FNP/ telephone  Pt stated that she received a call from the . This might be in reference to her xray's.  Pt's contact [phone]

## 2019-04-11 NOTE — TELEPHONE ENCOUNTER
Returned patient call,  Informed of normal xray imaging. Patient verbalized understanding and appreciative.

## 2019-04-16 DIAGNOSIS — I10 ESSENTIAL HYPERTENSION: ICD-10-CM

## 2019-04-16 RX ORDER — GABAPENTIN 300 MG/1
300 CAPSULE ORAL
OUTPATIENT
Start: 2019-04-16

## 2019-04-16 RX ORDER — GLIMEPIRIDE 1 MG/1
1 TABLET ORAL
Qty: 30 TAB | Refills: 0 | Status: SHIPPED | OUTPATIENT
Start: 2019-04-16 | End: 2019-05-16 | Stop reason: SDUPTHER

## 2019-04-16 RX ORDER — MELOXICAM 15 MG/1
15 TABLET ORAL DAILY
OUTPATIENT
Start: 2019-04-16

## 2019-04-16 RX ORDER — GABAPENTIN 100 MG/1
CAPSULE ORAL DAILY
OUTPATIENT
Start: 2019-04-16

## 2019-04-16 RX ORDER — LISINOPRIL AND HYDROCHLOROTHIAZIDE 20; 25 MG/1; MG/1
1 TABLET ORAL DAILY
Qty: 30 TAB | Refills: 0 | Status: SHIPPED | OUTPATIENT
Start: 2019-04-16 | End: 2019-05-16 | Stop reason: SDUPTHER

## 2019-04-16 NOTE — TELEPHONE ENCOUNTER
Marycruz Salinas, [place] [address]             Pt is requesting the following Rx going to the pharmacy on file [address] [facility],[address], [address] [place] [address](a 90 day supply for all of them please.)  The last doctor did inform her she had kidney stones.  She wanted to inform the PCP with this information. lisinopril-hydroCHLOROthiazide (PRINZIDE, ZESTORETIC) 20-25 mg per tablet   gabapentin (NEURONTIN) 100 mg capsule   gabapentin (NEURONTIN) 300 mg capsule   meloxicam (MOBIC) 15 mg tablet   glimepiride (AMARYL) 1 mg tablet     Best contact number for the Pt id you have any questions is [phone].

## 2019-04-17 NOTE — TELEPHONE ENCOUNTER
Patient given 30 day prescription for chronic conditions such as glimepiride and lisinopril HCTZ. However gabapentin is a controlled substance in [place] and has not been prescribed by [facility].    Patient will need an appointment for chronic conditions and to verify her medication list.     Jose Rodas MD

## 2019-04-23 ENCOUNTER — OFFICE VISIT (OUTPATIENT)
Dept: FAMILY MEDICINE CLINIC | Age: 64
End: 2019-04-23

## 2019-04-23 VITALS
TEMPERATURE: 98.1 F | BODY MASS INDEX: 34.66 KG/M2 | DIASTOLIC BLOOD PRESSURE: 89 MMHG | OXYGEN SATURATION: 99 % | HEIGHT: 64 IN | HEART RATE: 67 BPM | SYSTOLIC BLOOD PRESSURE: 152 MMHG | RESPIRATION RATE: 16 BRPM | WEIGHT: 203 LBS

## 2019-04-23 DIAGNOSIS — N20.0 KIDNEY STONE: ICD-10-CM

## 2019-04-23 DIAGNOSIS — J01.00 ACUTE MAXILLARY SINUSITIS, RECURRENCE NOT SPECIFIED: Primary | ICD-10-CM

## 2019-04-23 LAB
BILIRUB UR QL STRIP: NEGATIVE
GLUCOSE UR-MCNC: NEGATIVE MG/DL
KETONES P FAST UR STRIP-MCNC: NEGATIVE MG/DL
PH UR STRIP: 6 [PH] (ref 4.6–8)
PROT UR QL STRIP: NEGATIVE
SP GR UR STRIP: 1.01 (ref 1–1.03)
UA UROBILINOGEN AMB POC: NORMAL (ref 0.2–1)
URINALYSIS CLARITY POC: CLEAR
URINALYSIS COLOR POC: YELLOW
URINE BLOOD POC: NORMAL
URINE LEUKOCYTES POC: NEGATIVE
URINE NITRITES POC: NEGATIVE

## 2019-04-23 RX ORDER — CEFDINIR 300 MG/1
300 CAPSULE ORAL 2 TIMES DAILY
Qty: 14 CAP | Refills: 0 | Status: SHIPPED | OUTPATIENT
Start: 2019-04-23 | End: 2019-04-30

## 2019-04-23 NOTE — PROGRESS NOTES
Chief Complaint   Patient presents with    Kidney Stone    Sinus Infection     Body mass index is 34.84 kg/m². 1. Have you been to the ER, urgent care clinic since your last visit? Hospitalized since your last visit? No    2. Have you seen or consulted any other health care providers outside of the [address] [place] [address] since your last visit? Include any pap smears or colon screening.  No    Reviewed record in preparation for visit and have necessary documentation  Pt did not bring medication to office visit for review  Information was given to pt on Advanced Directives, Living Will  Information was given on Shingles Vaccine  Opportunity was given for questions  Goals that were addressed and/or need to be completed after this appointment include:     Health Maintenance Due   Topic Date Due    FOOT EXAM Q1  04/11/1964    MICROALBUMIN Q1  04/11/1964    EYE EXAM RETINAL OR DILATED  04/11/1964    DTaP/Tdap/Td series (1 - Tdap) 04/11/1975    Shingrix Vaccine Age 50> (1 of 2) 04/11/2004    MEDICARE YEARLY EXAM  01/09/2019    GLAUCOMA SCREENING Q2Y  04/11/2019    Bone Densitometry (Dexa) Screening  04/11/2019    Pneumococcal 65+ years (1 of 2 - PCV13) 04/11/2019

## 2019-04-23 NOTE — PROGRESS NOTES
[facility] [place]    Subjective:   April D Paulino Garcia is a 71 y.o. female with history of HTN, T2DM, and asthma   CC: sinus infection  History provided by patient     HPI:  She reports that she gets a sinus infection 4 or 5 times per year. She has a lot of trouble taking augmentin. She has been nauseous because she has been having a lot of post nasal drip. She has been having headaches beneath her eyes. She has been having bilateral tooth aches. She has been coughing up yellow and green sputum. She has been very fatigued. Her symptoms have been going on for 2 weeks. She denies fevers, chills, SOB, wheezing. She reports that her urine has been pink. She has been having a lot of pain with urination. She reports that she has continued to have blood, but her pain went away temporarily. She has been hurting since yesterday just when she urinates. Her pain is rated as 4/10. Her discomfort is in her pelvis. She had an xray of her abdomen a couple of months ago, which showed kidney stones at that point. She has been drinking more water. She denies frequency and urgency,      PFSH:     Current Outpatient Medications on File Prior to Visit   Medication Sig Dispense Refill    glimepiride (AMARYL) 1 mg tablet Take 1 Tab by mouth Daily (before breakfast). 30 Tab 0    lisinopril-hydroCHLOROthiazide (PRINZIDE, ZESTORETIC) 20-25 mg per tablet Take 1 Tab by mouth daily. 30 Tab 0    multivit with min-folic acid (WOMEN'S MULTIVITAMIN GUMMIES) 200 mcg chew Take  by mouth.  Lactobacillus acidophilus (PROBIOTIC PO) Take  by mouth.  nystatin (MYCOSTATIN) 100,000 unit/gram ointment Apply  to affected area two (2) times a day. 15 g 0    gabapentin (NEURONTIN) 100 mg capsule Take  by mouth daily.  traMADol (ULTRAM) 50 mg tablet Take 50 mg by mouth every six (6) hours as needed for Pain.  meloxicam (MOBIC) 15 mg tablet Take 15 mg by mouth daily.       gabapentin (NEURONTIN) 300 mg capsule Take 300 mg by mouth nightly.  albuterol (PROAIR HFA) 90 mcg/actuation inhaler Take 1 Puff by inhalation every four (4) hours as needed. 1 Inhaler 6    loratadine (CLARITIN) 10 mg tablet Take 1 tablet by mouth daily. 90 tablet 3    albuterol (PROVENTIL VENTOLIN) 2.5 mg /3 mL (0.083 %) nebulizer solution 3 mL by Nebulization route every four (4) hours as needed for Wheezing. 1 each 3    Nebulizer & Compressor machine by Does Not Apply route. q4 hours with medication 1 Each 0     No current facility-administered medications on file prior to visit.         Patient Active Problem List   Diagnosis Code    Asthma J45.909    HTN (hypertension) I10    AR (allergic rhinitis) J30.9    Alopecia, unspecified L65.9    Mammogram abnormal R92.8    Microscopic hematuria R31.29    Abnormal CPK R74.8    Controlled type 2 diabetes mellitus with complication, without long-term current use of insulin (HCC) E11.8       Social History     Socioeconomic History    Marital status:      Spouse name: Not on file    Number of children: Not on file    Years of education: Not on file    Highest education level: Not on file   Occupational History    Not on file   Social Needs    Financial resource strain: Not on file    Food insecurity:     Worry: Not on file     Inability: Not on file    Transportation needs:     Medical: Not on file     Non-medical: Not on file   Tobacco Use    Smoking status: Former Smoker     Last attempt to quit: 1974     Years since quittin.6    Smokeless tobacco: Never Used   Substance and Sexual Activity    Alcohol use: Yes     Comment: infrequent    Drug use: No    Sexual activity: Never   Lifestyle    Physical activity:     Days per week: Not on file     Minutes per session: Not on file    Stress: Not on file   Relationships    Social connections:     Talks on phone: Not on file     Gets together: Not on file     Attends Advent service: Not on file     Active member of club or organization: Not on file     Attends meetings of clubs or organizations: Not on file     Relationship status: Not on file    Intimate partner violence:     Fear of current or ex partner: Not on file     Emotionally abused: Not on file     Physically abused: Not on file     Forced sexual activity: Not on file   Other Topics Concern    Not on file   Social History Narrative    Not on file       Review of Systems   Constitutional: Positive for malaise/fatigue. Negative for chills and fever. HENT: Positive for congestion, sinus pain and sore throat. Negative for ear discharge and ear pain. Eyes: Negative for pain and redness. Respiratory: Positive for cough and sputum production. Negative for shortness of breath and wheezing. Cardiovascular: Negative for chest pain and leg swelling. Gastrointestinal: Negative for abdominal pain, constipation, diarrhea, nausea and vomiting. Genitourinary: Positive for dysuria and hematuria. Negative for flank pain, frequency and urgency. Musculoskeletal: Negative for joint pain. Skin: Negative for rash. Neurological: Negative for headaches. Objective:     Visit Vitals  /89   Pulse 67   Temp 98.1 °F (36.7 °C) (Oral)   Resp 16   Ht 5' 4\" (1.626 m)   Wt 203 lb (92.1 kg)   LMP 01/01/1975   SpO2 99%   BMI 34.84 kg/m²          Physical Exam   Constitutional: She appears well-developed and well-nourished. No distress. HENT:   Head: Normocephalic and atraumatic. Right Ear: External ear normal.   Left Ear: External ear normal.   Mouth/Throat: No oropharyngeal exudate. Mild erythema in posterior pharynx with post nasal drip. Clear crusted nasal discharge present. Tenderness to palpation of maxillary sinuses. Eyes: Pupils are equal, round, and reactive to light. Conjunctivae and EOM are normal. Right eye exhibits no discharge. Left eye exhibits no discharge. Neck: Normal range of motion. Neck supple.    Cardiovascular: Normal rate, regular rhythm, normal heart sounds and intact distal pulses. No murmur heard. Pulmonary/Chest: Effort normal and breath sounds normal. No respiratory distress. She has no wheezes. She has no rales. Abdominal: Soft. Bowel sounds are normal. She exhibits no distension. There is no tenderness. There is no rebound and no guarding. No CVA tenderness   Lymphadenopathy:     She has no cervical adenopathy. Skin: Skin is warm and dry. She is not diaphoretic. Pertinent Labs/Studies: UA w/ negative nitrites, LE, protein, glucose, bilirubin. Positive for 1+ blood. Assessment and orders:       ICD-10-CM ICD-9-CM    1. Acute maxillary sinusitis, recurrence not specified J01.00 461.0    2. Kidney stone N20.0 592.0 AMB POC URINALYSIS DIP STICK MANUAL W/O MICRO     Encounter Diagnoses   Name Primary?  Acute maxillary sinusitis, recurrence not specified Yes    Kidney stone      Diagnoses and all orders for this visit:    1. Acute maxillary sinusitis, recurrence not specified - Pt with symptoms for 2 weeks. Significant maxillary tenderness on exam. Symptoms worsening. Most consistent with bacterial infection. Will prescribe antibiotics. Augmentin usually first line; however, pt does not feel comfortable taking very large pills, such as augmentin. Will prescribe an alternative regimen with smaller pills. -     cefdinir (OMNICEF) 300 mg capsule; Take 1 Cap by mouth two (2) times a day for 7 days.        -     Encouraged to use flonase to help evacuate sinuses        -     Can utilize nasal rinse and decongestants to help with symptoms    2. Kidney stone - noted on imaging a couple of months ago. Pt reported passing a stone a couple of weeks ago. UA positive for blood, likely due to kidney stone.  Not suspicious for infection  -     AMB POC URINALYSIS DIP STICK MANUAL W/O MICRO  -     Encouraged pt to drink more water to help pass the stone  -     If pain increases significantly in intensity and has trouble passing stone, pt should go to ER          I have discussed the diagnosis with the patient and the intended plan as seen in the above orders. Social history, medical history, and labs were reviewed. The patient has received an after-visit summary and questions were answered concerning future plans. I have discussed medication side effects and warnings with the patient as well.     Kimberly Stauffer MD  Resident [facility]  04/25/19

## 2019-05-16 ENCOUNTER — OFFICE VISIT (OUTPATIENT)
Dept: FAMILY MEDICINE CLINIC | Age: 64
End: 2019-05-16

## 2019-05-16 VITALS
BODY MASS INDEX: 34.69 KG/M2 | TEMPERATURE: 98.6 F | DIASTOLIC BLOOD PRESSURE: 86 MMHG | SYSTOLIC BLOOD PRESSURE: 131 MMHG | RESPIRATION RATE: 20 BRPM | HEIGHT: 64 IN | HEART RATE: 78 BPM | WEIGHT: 203.2 LBS | OXYGEN SATURATION: 97 %

## 2019-05-16 DIAGNOSIS — M17.0 OSTEOARTHRITIS OF BOTH KNEES, UNSPECIFIED OSTEOARTHRITIS TYPE: ICD-10-CM

## 2019-05-16 DIAGNOSIS — E11.8 CONTROLLED TYPE 2 DIABETES MELLITUS WITH COMPLICATION, WITHOUT LONG-TERM CURRENT USE OF INSULIN (HCC): Primary | ICD-10-CM

## 2019-05-16 DIAGNOSIS — I10 ESSENTIAL HYPERTENSION: ICD-10-CM

## 2019-05-16 RX ORDER — LISINOPRIL AND HYDROCHLOROTHIAZIDE 20; 25 MG/1; MG/1
1 TABLET ORAL DAILY
Qty: 90 TAB | Refills: 1 | Status: SHIPPED | OUTPATIENT
Start: 2019-05-16 | End: 2019-11-19 | Stop reason: SDUPTHER

## 2019-05-16 RX ORDER — MELOXICAM 15 MG/1
15 TABLET ORAL DAILY
Qty: 90 TAB | Refills: 1 | Status: CANCELLED | OUTPATIENT
Start: 2019-05-16

## 2019-05-16 RX ORDER — NAPROXEN 250 MG/1
250 TABLET ORAL
Qty: 60 TAB | Refills: 1 | Status: SHIPPED | OUTPATIENT
Start: 2019-05-16 | End: 2020-03-17 | Stop reason: SDUPTHER

## 2019-05-16 RX ORDER — GLIMEPIRIDE 1 MG/1
0.5 TABLET ORAL
Qty: 30 TAB | Refills: 0 | Status: SHIPPED | OUTPATIENT
Start: 2019-05-16 | End: 2019-08-07 | Stop reason: SDUPTHER

## 2019-05-16 NOTE — PATIENT INSTRUCTIONS
STOP Meloxicam 
 
START Naproxen twice a day as needed for pain. Continue all other medications. Fasting sugars (glucose) between 100 and 125 or Hemoglobin A1C 5.6-6.4% are considered borderline or \"prediabetic\" implying an elevated risk for becoming diabetic in the near future. Weight loss with good healthy diet and routine, almost daily exercise may help delay or reverse this trend. Avoid sweets, limit starches and recheck fasting blood sugar and/or A1C  in 3 months. months. STOP the SUGAR The first step in dietary efforts at weight loss is removing as much sugar from the diet as possible. Most dietary sugar is in the forms of table sugar (sucrose), fruit sugar (fructose) and milk sugar (lactose). But, as the picture above demonstrates, you need to watch labels to see if processed foods have added sugars under other names. To eliminate sucrose, eliminate sweet drinks entirely including soft drinks, sports drinks, lemonade, sweet tea and wine. Also, eliminate candy and make desserts a \"special occasion only treat\". Don't eat desserts with every meal or every night. Most fructose is found in fruit and this should be markedly limited. Fruit juice should be avoided. One piece of fruit daily is the limit. Berries are a good choice to eat as a garnish for other foods. Bananas and grapes should be avoided. Milk sugar, or lactose, should be avoided as well. Milk is a good source of calcium but not for people struggling with weight issues. Put a little milk in your coffee or tea but otherwise avoid milk. How can you avoid sugar? For starters, don't buy it and don't bring it in the house. It won't tempt you that way!  
 
For more information: 
 
Try the internet for videos about sugar addiction or try diet doctor.[employer].

## 2019-05-16 NOTE — PROGRESS NOTES
I saw and evaluated the patient with the resident, performing the key elements of the exam and service. I discussed the findings, assessment and plan with the resident and agree with the resident's findings and plan as documented in the resident's note. Mary Montejo M.D.

## 2019-05-16 NOTE — PROGRESS NOTES
1. Have you been to the ER, urgent care clinic since your last visit? Hospitalized since your last visit? No 
 
2. Have you seen or consulted any other health care providers outside of the [address] [place] [address] since your last visit? Include any pap smears or colon screening. No 
Reviewed record in preparation for visit and have necessary documentation Pt did not bring medication to office visit for review Information was given to pt on Advanced Directives, Living Will Information was given on Shingles Vaccine 
opportunity was given for questions Goals that were addressed and/or need to be completed during or after this appointment include Health Maintenance Due Topic Date Due  
 FOOT EXAM Q1  04/11/1964  MICROALBUMIN Q1  04/11/1964  
 EYE EXAM RETINAL OR DILATED  04/11/1964  DTaP/Tdap/Td series (1 - Tdap) 04/11/1975  Shingrix Vaccine Age 50> (1 of 2) 04/11/2004  MEDICARE YEARLY EXAM  01/09/2019  GLAUCOMA SCREENING Q2Y  04/11/2019  Bone Densitometry (Dexa) Screening  04/11/2019  Pneumococcal 65+ years (1 of 2 - PCV13) 04/11/2019

## 2019-05-17 LAB
ALBUMIN/CREAT UR: <4 MG/G CREAT (ref 0–30)
CREAT UR-MCNC: 75.8 MG/DL
EST. AVERAGE GLUCOSE BLD GHB EST-MCNC: 140 MG/DL
HBA1C MFR BLD: 6.5 % (ref 4.8–5.6)
Lab: NORMAL
MICROALBUMIN UR-MCNC: <3 UG/ML

## 2019-06-25 ENCOUNTER — OFFICE VISIT (OUTPATIENT)
Dept: FAMILY MEDICINE CLINIC | Age: 64
End: 2019-06-25

## 2019-06-25 VITALS
SYSTOLIC BLOOD PRESSURE: 128 MMHG | OXYGEN SATURATION: 96 % | WEIGHT: 202 LBS | DIASTOLIC BLOOD PRESSURE: 82 MMHG | RESPIRATION RATE: 20 BRPM | HEIGHT: 64 IN | HEART RATE: 82 BPM | TEMPERATURE: 99.1 F | BODY MASS INDEX: 34.49 KG/M2

## 2019-06-25 DIAGNOSIS — Z23 ENCOUNTER FOR IMMUNIZATION: ICD-10-CM

## 2019-06-25 DIAGNOSIS — Z00.00 WELCOME TO MEDICARE PREVENTIVE VISIT: ICD-10-CM

## 2019-06-25 DIAGNOSIS — Z78.0 POSTMENOPAUSAL: ICD-10-CM

## 2019-06-25 DIAGNOSIS — B37.9 CANDIDIASIS: ICD-10-CM

## 2019-06-25 DIAGNOSIS — Z00.00 MEDICARE ANNUAL WELLNESS VISIT, INITIAL: Primary | ICD-10-CM

## 2019-06-25 RX ORDER — NYSTATIN 100000 [USP'U]/G
POWDER TOPICAL 4 TIMES DAILY
Qty: 30 G | Refills: 0 | Status: SHIPPED | OUTPATIENT
Start: 2019-06-25 | End: 2019-11-21

## 2019-06-25 NOTE — PATIENT INSTRUCTIONS
Medicare Wellness Visit, Female The best way to live healthy is to have a lifestyle where you eat a well-balanced diet, exercise regularly, limit alcohol use, and quit all forms of tobacco/nicotine, if applicable. Regular preventive services are another way to keep healthy. Preventive services (vaccines, screening tests, monitoring & exams) can help personalize your care plan, which helps you manage your own care. Screening tests can find health problems at the earliest stages, when they are easiest to treat. [facility] follows the current, evidence-based guidelines published by the [facility] ([facility]) when recommending preventive services for our patients. Because we follow these guidelines, sometimes recommendations change over time as research supports it. (For example, mammograms used to be recommended annually. Even though Medicare will still pay for an annual mammogram, the newer guidelines recommend a mammogram every two years for women of average risk.) Of course, you and your doctor may decide to screen more often for some diseases, based on your risk and your health status. Preventive services for you include: - Medicare offers their members a free annual wellness visit, which is time for you and your primary care provider to discuss and plan for your preventive service needs. Take advantage of this benefit every year! 
-All adults over the age of 72 should receive the recommended pneumonia vaccines. Current USPSTF guidelines recommend a series of two vaccines for the best pneumonia protection.  
-All adults should have a flu vaccine yearly and a tetanus vaccine every 10 years. All adults age 61 and older should receive a shingles vaccine once in their lifetime.   
-A bone mass density test is recommended when a woman turns 65 to screen for osteoporosis. This test is only recommended one time, as a screening. Some providers will use this same test as a disease monitoring tool if you already have osteoporosis. -All adults age 38-68 who are overweight should have a diabetes screening test once every three years.  
-Other screening tests and preventive services for persons with diabetes include: an eye exam to screen for diabetic retinopathy, a kidney function test, a foot exam, and stricter control over your cholesterol.  
-Cardiovascular screening for adults with routine risk involves an electrocardiogram (ECG) at intervals determined by your doctor.  
-Colorectal cancer screenings should be done for adults age 54-65 with no increased risk factors for colorectal cancer. There are a number of acceptable methods of screening for this type of cancer. Each test has its own benefits and drawbacks. Discuss with your doctor what is most appropriate for you during your annual wellness visit. The different tests include: colonoscopy (considered the best screening method), a fecal occult blood test, a fecal DNA test, and sigmoidoscopy. -Breast cancer screenings are recommended every other year for women of normal risk, age 54-69. 
-Cervical cancer screenings for women over age 72 are only recommended with certain risk factors.  
-All adults born between [facility] should be screened once for Hepatitis C. Here is a list of your current Health Maintenance items (your personalized list of preventive services) with a due date: 
Health Maintenance Due Topic Date Due [facility] Eye Exam  04/11/1965  DTaP/Tdap/Td  (1 - Tdap) 04/11/1975  Shingles Vaccine (1 of 2) 04/11/2004 [facility] Annual Well Visit  01/09/2019  Glaucoma Screening   04/11/2019  Bone Mineral Density   04/11/2019  Pneumococcal Vaccine (1 of 2 - PCV13) 04/11/2019 Yeast Skin Infection: Care Instructions Your Care Instructions Yeast normally lives on your skin.  Sometimes too much yeast can overgrow in certain areas of the skin and cause an infection. The infection causes red, scaly, moist patches on your skin that may itch. Common areas for skin yeast infections are skin folds under the breasts or belly area. The warm and moist areas in the skin folds can make it easier for yeast to overgrow. Yeast infections also can be found on other parts of the body such as the groin or armpits. You will probably get a cream or ointment that contains an antifungal medicine. Examples of these are miconazole and clotrimazole. You put it on your skin to treat the infection. Your doctor may give you a prescription for the cream or ointment. Or you may be able to buy it without a prescription at most drugstores. If the infection is severe, the doctor will prescribe antifungal pills. A yeast infection usually goes away after about a week of treatment. But it's important to use the medicine for as long as your doctor tells you to. Follow-up care is a key part of your treatment and safety. Be sure to make and go to all appointments, and call your doctor if you are having problems. It's also a good idea to know your test results and keep a list of the medicines you take. How can you care for yourself at home? · Be safe with medicines. Take your medicines exactly as prescribed. Call your doctor if you think you are having a problem with your medicine. · Keep your skin clean and dry. Your doctor may suggest using powder that contains an antifungal medicine in the skin folds. · Wear loose clothing. When should you call for help? Call your doctor now or seek immediate medical care if: 
  · You have symptoms of infection, such as: 
? Increased pain, swelling, warmth, or redness. ? Red streaks leading from the area. ? Pus draining from the area. ? A fever.  
 Watch closely for changes in your health, and be sure to contact your doctor if: 
  · You do not get better as expected. Where can you learn more? Go to http://rafael-vern.info/. Enter E967 in the search box to learn more about \"Yeast Skin Infection: Care Instructions. \" Current as of: April 17, 2018 Content Version: 11.9 © 8948-4738 [employer] [address] Care instructions adapted under license by [employer] (which disclaims liability or warranty for this information). If you have questions about a medical condition or this instruction, always ask your healthcare professional. [address] any warranty or liability for your use of this information.

## 2019-06-25 NOTE — PROGRESS NOTES
1. Have you been to the ER, urgent care clinic since your last visit? Hospitalized since your last visit? No    2. Have you seen or consulted any other health care providers outside of the [address] [place] [place] [address] since your last visit? Include any pap smears or colon screening.  No  Reviewed record in preparation for visit and have necessary documentation  Pt did not bring medication to office visit for review    Goals that were addressed and/or need to be completed during or after this appointment include   Health Maintenance Due   Topic Date Due    EYE EXAM RETINAL OR DILATED  04/11/1964    DTaP/Tdap/Td series (1 - Tdap) 04/11/1975    Shingrix Vaccine Age 50> (1 of 2) 04/11/2004    MEDICARE YEARLY EXAM  01/09/2019    GLAUCOMA SCREENING Q2Y  04/11/2019    Bone Densitometry (Dexa) Screening  04/11/2019    Pneumococcal 65+ years (1 of 2 - PCV13) 04/11/2019

## 2019-06-25 NOTE — PROGRESS NOTES
This is an Initial Medicare Annual Wellness Exam (AWV) (Performed 12 months after IPPE or effective date of Medicare Part B enrollment, Once in a lifetime)    I have reviewed the patient's medical history in detail and updated the computerized patient record. History     Past Medical History:   Diagnosis Date    Asthma     Breast cancer ([address]     Hx of seasonal allergies     Hypertension       Past Surgical History:   Procedure Laterality Date    BIOPSY BREAST      X3    BREAST SURGERY PROCEDURE UNLISTED      bilateral breast lumpectomy    HX HYSTERECTOMY      HX TONSILLECTOMY       Current Outpatient Medications   Medication Sig Dispense Refill    varicella-zoster recombinant, PF, (SHINGRIX, PF,) 50 mcg/0.5 mL susr injection Administer 0.5mL IM now and then again in 2 months. 0.5 mL 1    pneumococcal 13 ирина conj dip (PREVNAR-13) 0.5 mL syrg injection 0.5 mL by IntraMUSCular route once for 1 dose. 0.5 mL 0    diphth, pertus,acell,, tetanus (ACEL) 2.5-8-5 Lf-mcg-Lf/0.5mL syrg 0.5 mL by IntraMUSCular route once for 1 dose. 0.5 mL 0    glimepiride (AMARYL) 1 mg tablet Take 0.5 Tabs by mouth Daily (before breakfast). 30 Tab 0    lisinopril-hydroCHLOROthiazide (PRINZIDE, ZESTORETIC) 20-25 mg per tablet Take 1 Tab by mouth daily. 90 Tab 1    naproxen (NAPROSYN) 250 mg tablet Take 1 Tab by mouth two (2) times daily as needed for Pain. Take with meals. 60 Tab 1    multivit with min-folic acid (WOMEN'S MULTIVITAMIN GUMMIES) 200 mcg chew Take  by mouth.  Lactobacillus acidophilus (PROBIOTIC PO) Take  by mouth.  nystatin (MYCOSTATIN) 100,000 unit/gram ointment Apply  to affected area two (2) times a day. 15 g 0    gabapentin (NEURONTIN) 100 mg capsule Take 100 mg by mouth daily.  traMADol (ULTRAM) 50 mg tablet Take 50 mg by mouth every six (6) hours as needed for Pain.  meloxicam (MOBIC) 15 mg tablet Take 15 mg by mouth daily.       gabapentin (NEURONTIN) 300 mg capsule Take 300 mg by mouth nightly.  albuterol (PROAIR HFA) 90 mcg/actuation inhaler Take 1 Puff by inhalation every four (4) hours as needed. 1 Inhaler 6    loratadine (CLARITIN) 10 mg tablet Take 1 tablet by mouth daily. 90 tablet 3    albuterol (PROVENTIL VENTOLIN) 2.5 mg /3 mL (0.083 %) nebulizer solution 3 mL by Nebulization route every four (4) hours as needed for Wheezing. 1 each 3    Nebulizer & Compressor machine by Does Not Apply route. q4 hours with medication 1 Each 0     Allergies   Allergen Reactions    Oxycodone-Acetaminophen Other (comments)     felt like \"out of body experience\"  Other reaction(s):  Other (comments)  felt like \"out of body experience\"     Sabine Soda [Oxycodone Hcl-Oxycodone-Asa] Other (comments)     felt like \"out of body experience\"     Family History   Problem Relation Age of Onset    Hypertension Mother     Hypertension Father     Dementia Father     Stroke Father     Diabetes Sister      Social History     Tobacco Use    Smoking status: Former Smoker     Last attempt to quit: 1974     Years since quittin.8    Smokeless tobacco: Never Used   Substance Use Topics    Alcohol use: Yes     Comment: infrequent     Patient Active Problem List   Diagnosis Code    Asthma J45.909    HTN (hypertension) I10    AR (allergic rhinitis) J30.9    Alopecia, unspecified L65.9    Mammogram abnormal R92.8    Microscopic hematuria R31.29    Abnormal CPK R74.8    Controlled type 2 diabetes mellitus with complication, without long-term current use of insulin ([facility] [address] E11.8       Depression Risk Factor Screening:     3 most recent PHQ Screens 2019   Little interest or pleasure in doing things Several days   Feeling down, depressed, irritable, or hopeless Several days   Total Score PHQ 2 2   Trouble falling or staying asleep, or sleeping too much -   Feeling tired or having little energy -   Poor appetite, weight loss, or overeating -   Feeling bad about yourself - or that you are a failure or have let yourself or your family down -   Trouble concentrating on things such as school, work, reading, or watching TV -   Moving or speaking so slowly that other people could have noticed; or the opposite being so fidgety that others notice -   Thoughts of being better off dead, or hurting yourself in some way -   PHQ 9 Score -   How difficult have these problems made it for you to do your work, take care of your home and get along with others -     Alcohol Risk Factor Screening: You do not drink alcohol or very rarely. Functional Ability and Level of Safety:     Hearing Loss  Hearing is good. Activities of Daily Living  The home contains: no safety equipment. and adequate lighting  Patient does total self care    Fall Risk  Fall Risk Assessment, last 12 mths 6/25/2019   Able to walk? Yes   Fall in past 12 months? No       Abuse Screen  Patient is not abused    Cognitive Screening   Evaluation of Cognitive Function:  Has your family/caregiver stated any concerns about your memory: no  Normal    Patient Care Team   Patient Care Team:  Unknown, Provider as PCP - General    Assessment/Plan   Education and counseling provided:  Are appropriate based on today's review and evaluation. Patient is still grieving from loss of her sone in December. He was hit and kill in a car accident. She is able to function and continue with daily activities. She is sadden by memories of him but this does not interfere with her daily life. Diagnoses and all orders for this visit:    1. Medicare annual wellness visit, initial  -     AMB POC EKG ROUTINE W/ 12 LEADS, SCREEN ()    2. Welcome to Medicare preventive visit    3. Postmenopausal  -     DEXA BONE DENSITY STUDY AXIAL; Future    4. Encounter for immunization  -     varicella-zoster recombinant, PF, (SHINGRIX, PF,) 50 mcg/0.5 mL susr injection; Administer 0.5mL IM now and then again in 2 months.   -     pneumococcal 13 ирина conj dip (PREVNAR-13) 0.5 mL syrg injection; 0.5 mL by IntraMUSCular route once for 1 dose.  -     diphth, pertus,acell,, tetanus (ACEL) 2.5-8-5 Lf-mcg-Lf/0.5mL syrg; 0.5 mL by IntraMUSCular route once for 1 dose.          Health Maintenance Due   Topic Date Due    EYE EXAM RETINAL OR DILATED  04/11/1964    DTaP/Tdap/Td series (1 - Tdap) 04/11/1975    Shingrix Vaccine Age 50> (1 of 2) 04/11/2004    MEDICARE YEARLY EXAM  01/09/2019    GLAUCOMA SCREENING Q2Y  04/11/2019    Bone Densitometry (Dexa) Screening  04/11/2019    Pneumococcal 65+ years (1 of 2 - PCV13) 04/11/2019

## 2019-07-03 ENCOUNTER — OFFICE VISIT (OUTPATIENT)
Dept: FAMILY MEDICINE CLINIC | Age: 64
End: 2019-07-03

## 2019-07-03 VITALS
OXYGEN SATURATION: 97 % | RESPIRATION RATE: 20 BRPM | TEMPERATURE: 98.1 F | HEIGHT: 64 IN | DIASTOLIC BLOOD PRESSURE: 75 MMHG | SYSTOLIC BLOOD PRESSURE: 122 MMHG | HEART RATE: 87 BPM | BODY MASS INDEX: 35 KG/M2 | WEIGHT: 205 LBS

## 2019-07-03 DIAGNOSIS — T50.905A ADVERSE EFFECT OF DRUG, INITIAL ENCOUNTER: ICD-10-CM

## 2019-07-03 DIAGNOSIS — T80.90XA INJECTION SITE REACTION, INITIAL ENCOUNTER: Primary | ICD-10-CM

## 2019-07-03 PROBLEM — M54.50 LOW BACK PAIN: Status: ACTIVE | Noted: 2019-07-03

## 2019-07-03 PROBLEM — M25.569 KNEE PAIN: Status: ACTIVE | Noted: 2019-07-03

## 2019-07-03 RX ORDER — LANOLIN ALCOHOL/MO/W.PET/CERES
500 CREAM (GRAM) TOPICAL DAILY
Qty: 90 TAB | Refills: 1 | COMMUNITY
Start: 2019-07-03

## 2019-07-03 NOTE — PROGRESS NOTES
April D   71 y.o. female  1955  [address] [place] [address]  [id number]     [facility] Practice: Progress Note       Encounter Date: 7/3/2019    Chief Complaint   Patient presents with    Allergic Reaction     History of Present Illness   April SB Ramirez is a 71 y.o. female who presents to clinic today for:    Allergic Reaction-states the pharmacist at [facility] administered the pneumonia vaccine in her left deltoid on 06/25/2019. States she noted redness, itching and pain at the injection site and returned to [place] on 07/01/2019 and was advised to f/u with her PCP. Treatment-ibuprofen for the tenderness. Reviewed CDC info with the patient. Denies-Fever  Loss of appetite  Fussiness (irritability)  Feeling tired  Headache  Chills  Muscle aches  Numbness/tingling. Health Maintenance    Health Maintenance Due   Topic Date Due    DTaP/Tdap/Td series (1 - Tdap) 04/11/1975    Shingrix Vaccine Age 50> (1 of 2) 04/11/2004    Bone Densitometry (Dexa) Screening  04/11/2019     Review of Systems   Review of Systems   Constitutional: Negative. HENT: Negative. Eyes: Negative. Respiratory: Negative. Cardiovascular: Negative. Gastrointestinal: Negative. Genitourinary: Negative. Musculoskeletal: Negative. Skin: Positive for itching and rash. Neurological: Negative. Endo/Heme/Allergies: Negative. Psychiatric/Behavioral: Negative. Vitals/Objective:     Vitals:    07/03/19 1411   BP: 122/75   Pulse: 87   Resp: 20   Temp: 98.1 °F (36.7 °C)   TempSrc: Oral   SpO2: 97%   Weight: 205 lb (93 kg)   Height: 5' 4\" (1.626 m)     Body mass index is 35.19 kg/m². Physical Exam   Musculoskeletal:        Left upper arm: She exhibits tenderness. She exhibits no swelling, no edema, no deformity and no laceration. Arms:  Skin: Skin is warm, dry and intact. Psychiatric: She has a normal mood and affect.  Her speech is normal and behavior is normal. Judgment and thought content normal. Cognition and memory are normal.       No results found for this or any previous visit (from the past 24 hour(s)). Assessment and Plan:   1. Adverse effect of drug, initial encounter      2. Injection site reaction, initial encounter    Discuss cold compress to site for 15-20 min, OTC tylenol/ibuprofen and hydrocortisone with aloe for itch as needed. RTC in symptoms worsening. I have discussed the diagnosis with the patient and the intended plan as seen in the above orders. she has expressed understanding. The patient has received an after-visit summary and questions were answered concerning future plans. I have discussed medication side effects and warnings with the patient as well. Electronically Signed: Blake Herring NP     History/Allergies   Patients past medical, surgical and family histories were reviewed and updated.     Past Medical History:   Diagnosis Date    Asthma     Breast cancer ([facility] [address]     Hx of seasonal allergies     Hypertension       Past Surgical History:   Procedure Laterality Date    BIOPSY BREAST      X3    BREAST SURGERY PROCEDURE UNLISTED      bilateral breast lumpectomy    HX HYSTERECTOMY      HX TONSILLECTOMY       Family History   Problem Relation Age of Onset    Hypertension Mother     Hypertension Father     Dementia Father     Stroke Father     Diabetes Sister      Social History     Socioeconomic History    Marital status:      Spouse name: Not on file    Number of children: Not on file    Years of education: Not on file    Highest education level: Not on file   Occupational History    Not on file   Social Needs    Financial resource strain: Not on file    Food insecurity:     Worry: Not on file     Inability: Not on file    Transportation needs:     Medical: Not on file     Non-medical: Not on file   Tobacco Use    Smoking status: Former Smoker     Last attempt to quit: 9/1/1974     Years since quitting: 44.8    Smokeless tobacco: Never Used   Substance and Sexual Activity    Alcohol use: Yes     Comment: infrequent    Drug use: No    Sexual activity: Never   Lifestyle    Physical activity:     Days per week: Not on file     Minutes per session: Not on file    Stress: Not on file   Relationships    Social connections:     Talks on phone: Not on file     Gets together: Not on file     Attends [employer] service: Not on file     Active member of club or organization: Not on file     Attends meetings of clubs or organizations: Not on file     Relationship status: Not on file    Intimate partner violence:     Fear of current or ex partner: Not on file     Emotionally abused: Not on file     Physically abused: Not on file     Forced sexual activity: Not on file   Other Topics Concern    Not on file   Social History Narrative    Not on file         Allergies   Allergen Reactions    Oxycodone-Acetaminophen Other (comments)     felt like \"out of body experience\"  Other reaction(s): Other (comments)  felt like \"out of body experience\"      Percodan [Oxycodone Hcl-Oxycodone-Asa] Other (comments)     felt like \"out of body experience\"       Disposition     Follow-up and Dispositions  ·   Return if symptoms worsen or fail to improve. Future Appointments   Date Time Provider Lisa Soha   8/27/2019 10:10 AM [facility]            Current Medications after this visit     Current Outpatient Medications   Medication Sig    cyanocobalamin (VITAMIN B12) 500 mcg tablet Take 1 Tab by mouth daily. gummie    glimepiride (AMARYL) 1 mg tablet Take 0.5 Tabs by mouth Daily (before breakfast).  lisinopril-hydroCHLOROthiazide (PRINZIDE, ZESTORETIC) 20-25 mg per tablet Take 1 Tab by mouth daily.  naproxen (NAPROSYN) 250 mg tablet Take 1 Tab by mouth two (2) times daily as needed for Pain. Take with meals.     multivit with min-folic acid (WOMEN'S MULTIVITAMIN GUMMIES) 200 mcg chew Take  by mouth.  Lactobacillus acidophilus (PROBIOTIC PO) Take  by mouth.  gabapentin (NEURONTIN) 100 mg capsule Take 100 mg by mouth daily.  traMADol (ULTRAM) 50 mg tablet Take 50 mg by mouth every six (6) hours as needed for Pain.  gabapentin (NEURONTIN) 300 mg capsule Take 300 mg by mouth nightly.  albuterol (PROAIR HFA) 90 mcg/actuation inhaler Take 1 Puff by inhalation every four (4) hours as needed.  loratadine (CLARITIN) 10 mg tablet Take 1 tablet by mouth daily.  albuterol (PROVENTIL VENTOLIN) 2.5 mg /3 mL (0.083 %) nebulizer solution 3 mL by Nebulization route every four (4) hours as needed for Wheezing.  Nebulizer & Compressor machine by Does Not Apply route. q4 hours with medication    nystatin (MYCOSTATIN) powder Apply  to affected area four (4) times daily. Apply to pannus. No current facility-administered medications for this visit. There are no discontinued medications.

## 2019-07-03 NOTE — PROGRESS NOTES
1. Have you been to the ER, urgent care clinic since your last visit? Hospitalized since your last visit? No    2. Have you seen or consulted any other health care providers outside of the [address] [place] [address] since your last visit? Include any pap smears or colon screening.  No  Reviewed record in preparation for visit and have necessary documentation  Pt did not bring medication to office visit for review    Goals that were addressed and/or need to be completed during or after this appointment include   Health Maintenance Due   Topic Date Due    DTaP/Tdap/Td series (1 - Tdap) 04/11/1975    Shingrix Vaccine Age 50> (1 of 2) 04/11/2004    Bone Densitometry (Dexa) Screening  04/11/2019    Pneumococcal 65+ years (1 of 2 - PCV13) 04/11/2019

## 2019-08-07 DIAGNOSIS — E11.8 CONTROLLED TYPE 2 DIABETES MELLITUS WITH COMPLICATION, WITHOUT LONG-TERM CURRENT USE OF INSULIN (HCC): ICD-10-CM

## 2019-08-07 RX ORDER — GLIMEPIRIDE 1 MG/1
TABLET ORAL
Qty: 30 TAB | Refills: 0 | Status: SHIPPED | OUTPATIENT
Start: 2019-08-07 | End: 2019-11-19 | Stop reason: SDUPTHER

## 2019-11-19 ENCOUNTER — HOSPITAL ENCOUNTER (OUTPATIENT)
Dept: LAB | Age: 64
Discharge: HOME | End: 2019-11-19

## 2019-11-19 ENCOUNTER — OFFICE VISIT (OUTPATIENT)
Dept: FAMILY MEDICINE CLINIC | Age: 64
End: 2019-11-19

## 2019-11-19 VITALS
HEART RATE: 68 BPM | HEIGHT: 64 IN | DIASTOLIC BLOOD PRESSURE: 63 MMHG | TEMPERATURE: 98.6 F | RESPIRATION RATE: 20 BRPM | WEIGHT: 208 LBS | OXYGEN SATURATION: 98 % | BODY MASS INDEX: 35.51 KG/M2 | SYSTOLIC BLOOD PRESSURE: 107 MMHG

## 2019-11-19 DIAGNOSIS — J30.9 ALLERGIC RHINITIS, UNSPECIFIED SEASONALITY, UNSPECIFIED TRIGGER: ICD-10-CM

## 2019-11-19 DIAGNOSIS — E66.01 SEVERE OBESITY (HCC): ICD-10-CM

## 2019-11-19 DIAGNOSIS — Z78.0 POSTMENOPAUSAL: Primary | ICD-10-CM

## 2019-11-19 DIAGNOSIS — I10 ESSENTIAL HYPERTENSION: ICD-10-CM

## 2019-11-19 DIAGNOSIS — E11.8 CONTROLLED TYPE 2 DIABETES MELLITUS WITH COMPLICATION, WITHOUT LONG-TERM CURRENT USE OF INSULIN (HCC): ICD-10-CM

## 2019-11-19 LAB
EST. AVERAGE GLUCOSE BLD GHB EST-MCNC: 151 MG/DL
HBA1C MFR BLD: 6.9 % (ref 4–5.6)

## 2019-11-19 RX ORDER — GLIMEPIRIDE 1 MG/1
TABLET ORAL
Qty: 90 TAB | Refills: 0 | Status: SHIPPED | OUTPATIENT
Start: 2019-11-19 | End: 2020-08-10 | Stop reason: SDUPTHER

## 2019-11-19 RX ORDER — AZELASTINE 1 MG/ML
1 SPRAY, METERED NASAL 2 TIMES DAILY
Qty: 1 BOTTLE | Refills: 2 | Status: SHIPPED | OUTPATIENT
Start: 2019-11-19 | End: 2021-11-29

## 2019-11-19 RX ORDER — LISINOPRIL AND HYDROCHLOROTHIAZIDE 20; 25 MG/1; MG/1
1 TABLET ORAL DAILY
Qty: 90 TAB | Refills: 1 | Status: SHIPPED | OUTPATIENT
Start: 2019-11-19 | End: 2020-08-10 | Stop reason: SDUPTHER

## 2019-11-19 NOTE — PROGRESS NOTES
1. Have you been to the ER, urgent care clinic since your last visit? Hospitalized since your last visit? No    2. Have you seen or consulted any other health care providers outside of the [address] [place] [address] since your last visit? Include any pap smears or colon screening.  No  Reviewed record in preparation for visit and have necessary documentation  Pt did not bring medication to office visit for review    Goals that were addressed and/or need to be completed during or after this appointment include   Health Maintenance Due   Topic Date Due    DTaP/Tdap/Td series (1 - Tdap) 04/11/1975    Shingrix Vaccine Age 50> (1 of 2) 04/11/2004    Bone Densitometry (Dexa) Screening  04/11/2019    Influenza Age 9 to Adult  08/01/2019    HEMOGLOBIN A1C Q6M  11/16/2019

## 2019-11-19 NOTE — PROGRESS NOTES
Follow-up in November and obtain PSA and MRI 1 week prior.   Subjective  CC: April D Jamarcus Hernández is an 71 y.o. female presents for DM and HTN follow-up    HTN:  Has been taking medication as prescribed. Does not take BP at home. In in the process of getting a new blood pressure cuff. She denies CP, SOB, or swelling in legs.     DM:  A1c in 5/2019 was 6.5. She has been taking glimepiride 1/2 daily. Metformin gave her terrible GI side effects. Her fasting glucose is 90-120s. She has never had symptoms of low blood sugar. She saw her 6/25/19 and no diabetic retinopathy was seen. She denies N/V.     Sinus pressure  Symptoms presented 5 days ago. She has a postnasal drip, hoarseness. She has also noted a cough. Has tried tea with honey which has helped some. Infrequently uses nasal spray (does not recall if it is a steroid spray or nasal saline). Allergies - reviewed: Allergies   Allergen Reactions    Oxycodone-Acetaminophen Other (comments)     felt like \"out of body experience\"  Other reaction(s): Other (comments)  felt like \"out of body experience\"      Percodan [Oxycodone Hcl-Oxycodone-Asa] Other (comments)     felt like \"out of body experience\"         Medications - reviewed:   Current Outpatient Medications   Medication Sig    glimepiride (AMARYL) 1 mg tablet TAKE 1/2 (ONE-HALF) TABLET BY MOUTH ONCE DAILY    cyanocobalamin (VITAMIN B12) 500 mcg tablet Take 1 Tab by mouth daily. gummie    lisinopril-hydroCHLOROthiazide (PRINZIDE, ZESTORETIC) 20-25 mg per tablet Take 1 Tab by mouth daily.  naproxen (NAPROSYN) 250 mg tablet Take 1 Tab by mouth two (2) times daily as needed for Pain. Take with meals.  multivit with min-folic acid (WOMEN'S MULTIVITAMIN GUMMIES) 200 mcg chew Take  by mouth.  Lactobacillus acidophilus (PROBIOTIC PO) Take  by mouth.  gabapentin (NEURONTIN) 100 mg capsule Take 100 mg by mouth daily.  traMADol (ULTRAM) 50 mg tablet Take 50 mg by mouth every six (6) hours as needed for Pain.     gabapentin (NEURONTIN) 300 mg capsule Take 300 mg by mouth nightly.  albuterol (PROAIR HFA) 90 mcg/actuation inhaler Take 1 Puff by inhalation every four (4) hours as needed.  loratadine (CLARITIN) 10 mg tablet Take 1 tablet by mouth daily.  albuterol (PROVENTIL VENTOLIN) 2.5 mg /3 mL (0.083 %) nebulizer solution 3 mL by Nebulization route every four (4) hours as needed for Wheezing.  Nebulizer & Compressor machine by Does Not Apply route. q4 hours with medication    nystatin (MYCOSTATIN) powder Apply  to affected area four (4) times daily. Apply to pannus. No current facility-administered medications for this visit. Past Medical History - reviewed:  Past Medical History:   Diagnosis Date    Asthma     Breast cancer [address] [address]     Hx of seasonal allergies     Hypertension          Immunizations - reviewed:   Immunization History   Administered Date(s) Administered    Influenza Vaccine (Quad) PF 12/05/2014, 12/01/2015    Influenza Vaccine Split 01/24/2011, 10/31/2011, 10/26/2012    Pneumococcal Conjugate (PCV-13) 06/25/2019         ROS  Review of Systems : A review of systems was performed. All pertinent negatives and positives are mentioned in the HPI. Physical Exam  Visit Vitals  /63 (BP 1 Location: Right arm, BP Patient Position: Sitting)   Pulse 68   Temp 98.6 °F (37 °C) (Oral)   Resp 20   Ht 5' 4\" (1.626 m)   Wt 208 lb (94.3 kg)   LMP 01/01/1975   SpO2 98%   BMI 35.70 kg/m²       General appearance - Alert, NAD. Head: Atraumatic. Normocephalic. No lymphadenopathy  Eyes: EOMI. Sclera white. Ears: Hearing grossly normal. TM non erythematous with no effusion   Nose: Nares patent, no polyps. Pale turbinates  Neck: No cervical lymphadenopathy or goiter present  Throat: pharynx clear, no exudates. Respiratory - LCTAB. No wheeze/rale/rhonchi  Heart - Normal rate, regular rhythm. No m/r/r  Abdomen - Soft, non tender. Non distended. Neurological - No focal deficits. Speech normal.   Extremities - No LE edema.  Distal pulses intact  Skin - normal coloration and normal turgor. No cyanosis, no rash. Assessment/Plan  1. Controlled type 2 diabetes mellitus with complication, without long-term current use of insulin ([address] - Will recheck A1c today. Had good control at previous testing.  - glimepiride (AMARYL) 1 mg tablet; Take 1/2 tablet by mouth once daily. Dispense: 90 Tab; Refill: 0  - HEMOGLOBIN A1C WITH EAG; Future    2. Essential hypertension - BP at goal.  - lisinopril-hydroCHLOROthiazide (PRINZIDE, ZESTORETIC) 20-25 mg per tablet; Take 1 Tab by mouth daily. Dispense: 90 Tab; Refill: 1    3. Postmenopausal  - DEXA BONE DENSITY STUDY AXIAL; Future    4. Allergic rhinitis, unspecified seasonality, unspecified trigger - Discussed continued use of current nasal spay and PO antihistamine but will add intranasal antihistamine to help with symptoms. - azelastine (ASTELIN) 137 mcg (0.1 %) nasal spray; 1 [place] by Both Nostrils route two (2) times a day. Use in each nostril as directed  Dispense: 1 Bottle; Refill: 2          I have discussed the aforementioned diagnoses and plan with the patient in detail. I have provided information in person and/or in AVS. All questions answered prior to discharge.     Johann Fields MD  Family Medicine Resident

## 2019-11-26 PROBLEM — E66.01 SEVERE OBESITY (HCC): Status: ACTIVE | Noted: 2019-11-26

## 2020-03-17 ENCOUNTER — OFFICE VISIT (OUTPATIENT)
Dept: FAMILY MEDICINE CLINIC | Age: 65
End: 2020-03-17

## 2020-03-17 ENCOUNTER — TELEPHONE (OUTPATIENT)
Dept: FAMILY MEDICINE CLINIC | Age: 65
End: 2020-03-17

## 2020-03-17 ENCOUNTER — HOSPITAL ENCOUNTER (OUTPATIENT)
Dept: LAB | Age: 65
Discharge: HOME | End: 2020-03-17

## 2020-03-17 VITALS
OXYGEN SATURATION: 95 % | HEIGHT: 64 IN | RESPIRATION RATE: 20 BRPM | BODY MASS INDEX: 35.51 KG/M2 | DIASTOLIC BLOOD PRESSURE: 64 MMHG | TEMPERATURE: 96.9 F | SYSTOLIC BLOOD PRESSURE: 128 MMHG | HEART RATE: 70 BPM | WEIGHT: 208 LBS

## 2020-03-17 DIAGNOSIS — Z78.0 POSTMENOPAUSAL: ICD-10-CM

## 2020-03-17 DIAGNOSIS — Z13.220 SCREENING FOR HYPERLIPIDEMIA: ICD-10-CM

## 2020-03-17 DIAGNOSIS — I10 ESSENTIAL HYPERTENSION: ICD-10-CM

## 2020-03-17 DIAGNOSIS — Z12.31 ENCOUNTER FOR SCREENING MAMMOGRAM FOR BREAST CANCER: ICD-10-CM

## 2020-03-17 DIAGNOSIS — M17.0 OSTEOARTHRITIS OF BOTH KNEES, UNSPECIFIED OSTEOARTHRITIS TYPE: ICD-10-CM

## 2020-03-17 DIAGNOSIS — K21.9 GASTROESOPHAGEAL REFLUX DISEASE, ESOPHAGITIS PRESENCE NOT SPECIFIED: ICD-10-CM

## 2020-03-17 DIAGNOSIS — J30.9 ALLERGIC RHINITIS, UNSPECIFIED SEASONALITY, UNSPECIFIED TRIGGER: Primary | ICD-10-CM

## 2020-03-17 LAB
ALBUMIN SERPL-MCNC: 3.7 G/DL (ref 3.5–5)
ALBUMIN/GLOB SERPL: 1.1 {RATIO} (ref 1.1–2.2)
ALP SERPL-CCNC: 66 U/L (ref 45–117)
ALT SERPL-CCNC: 35 U/L (ref 12–78)
ANION GAP SERPL CALC-SCNC: 8 MMOL/L (ref 5–15)
AST SERPL-CCNC: 30 U/L (ref 15–37)
BASOPHILS # BLD: 0.1 K/UL (ref 0–0.1)
BASOPHILS NFR BLD: 1 % (ref 0–1)
BILIRUB SERPL-MCNC: 0.4 MG/DL (ref 0.2–1)
BUN SERPL-MCNC: 18 MG/DL (ref 6–20)
BUN/CREAT SERPL: 21 (ref 12–20)
CALCIUM SERPL-MCNC: 9.5 MG/DL (ref 8.5–10.1)
CHLORIDE SERPL-SCNC: 107 MMOL/L (ref 97–108)
CHOLEST SERPL-MCNC: 166 MG/DL
CO2 SERPL-SCNC: 27 MMOL/L (ref 21–32)
CREAT SERPL-MCNC: 0.86 MG/DL (ref 0.55–1.02)
DIFFERENTIAL METHOD BLD: ABNORMAL
EOSINOPHIL # BLD: 0.2 K/UL (ref 0–0.4)
EOSINOPHIL NFR BLD: 2 % (ref 0–7)
ERYTHROCYTE [DISTWIDTH] IN BLOOD BY AUTOMATED COUNT: 13.9 % (ref 11.5–14.5)
GLOBULIN SER CALC-MCNC: 3.4 G/DL (ref 2–4)
GLUCOSE SERPL-MCNC: 107 MG/DL (ref 65–100)
HCT VFR BLD AUTO: 40.4 % (ref 35–47)
HDLC SERPL-MCNC: 61 MG/DL
HDLC SERPL: 2.7 {RATIO} (ref 0–5)
HGB BLD-MCNC: 12.7 G/DL (ref 11.5–16)
IMM GRANULOCYTES # BLD AUTO: 0 K/UL (ref 0–0.04)
IMM GRANULOCYTES NFR BLD AUTO: 1 % (ref 0–0.5)
LDLC SERPL CALC-MCNC: 84.6 MG/DL (ref 0–100)
LIPID PROFILE,FLP: NORMAL
LYMPHOCYTES # BLD: 3.7 K/UL (ref 0.8–3.5)
LYMPHOCYTES NFR BLD: 44 % (ref 12–49)
MCH RBC QN AUTO: 28.4 PG (ref 26–34)
MCHC RBC AUTO-ENTMCNC: 31.4 G/DL (ref 30–36.5)
MCV RBC AUTO: 90.4 FL (ref 80–99)
MONOCYTES # BLD: 0.7 K/UL (ref 0–1)
MONOCYTES NFR BLD: 8 % (ref 5–13)
NEUTS SEG # BLD: 3.8 K/UL (ref 1.8–8)
NEUTS SEG NFR BLD: 44 % (ref 32–75)
NRBC # BLD: 0 K/UL (ref 0–0.01)
NRBC BLD-RTO: 0 PER 100 WBC
PLATELET # BLD AUTO: 276 K/UL (ref 150–400)
PMV BLD AUTO: 11.4 FL (ref 8.9–12.9)
POTASSIUM SERPL-SCNC: 4.1 MMOL/L (ref 3.5–5.1)
PROT SERPL-MCNC: 7.1 G/DL (ref 6.4–8.2)
RBC # BLD AUTO: 4.47 M/UL (ref 3.8–5.2)
SODIUM SERPL-SCNC: 142 MMOL/L (ref 136–145)
TRIGL SERPL-MCNC: 102 MG/DL (ref ?–150)
VLDLC SERPL CALC-MCNC: 20.4 MG/DL
WBC # BLD AUTO: 8.5 K/UL (ref 3.6–11)

## 2020-03-17 RX ORDER — NAPROXEN 250 MG/1
250 TABLET ORAL
Qty: 60 TAB | Refills: 1 | Status: SHIPPED | OUTPATIENT
Start: 2020-03-17 | End: 2020-12-09 | Stop reason: SDUPTHER

## 2020-03-17 NOTE — PATIENT INSTRUCTIONS
START Famotidine 25mg twice a day for acid reflex. RESTART Flonase to help with post nasal drip. You may use in conjunction with azelastine. Your physician has referred you for a mammogram. A member of the [facility] Patient Care Team will contact you within 24 hours to schedule. If you do not hear from a team member, please call [phone] to speak with this team directly. If you prefer alternate locations, here are some contact numbers: [place] [place]- [phone] [place]- [phone] [place]- [phone] [address] [facility]- [phone]

## 2020-03-17 NOTE — PROGRESS NOTES
1. Have you been to the ER, urgent care clinic since your last visit? Hospitalized since your last visit? No    2. Have you seen or consulted any other health care providers outside of the [address] [place] [address] since your last visit? Include any pap smears or colon screening.  No  Reviewed record in preparation for visit and have necessary documentation  Pt did not bring medication to office visit for review    Goals that were addressed and/or need to be completed during or after this appointment include   Health Maintenance Due   Topic Date Due    DTaP/Tdap/Td series (1 - Tdap) 04/11/1975    Shingrix Vaccine Age 50> (1 of 2) 04/11/2004    Bone Densitometry (Dexa) Screening  04/11/2019    Breast Cancer Screen Mammogram  04/16/2019    Influenza Age 9 to Adult  08/01/2019    Lipid Screen  01/09/2020

## 2020-03-17 NOTE — PROGRESS NOTES
Subjective  CC: April D Nadya Jackson is an 71 y.o. female presents for cough and watery eyes    Allergies  Pt has been having noproductive cough and watery eyes since Saturday. She thinks it is from her allergies. No fever, SOB. She takes Claritin which helps. She has been on Zyrtec which makes her sleepy. She has been on the azelastine but costs $65.    GERD  Complaints of acid reflex on occasion (especially when she eats greasy foods or tomatoes). Would like to know what else she can take since Zantac is off-the-market. Hypertension Follow up:  Currently taking lisinopril-HCTZ  The patient reports:  taking medications as instructed, no medication side effects noted. Does not participate in home BP monitoring. No vision changes, no chest pain on exertion, no dyspnea on exertion, no swelling of ankles. BP Readings from Last 3 Encounters:   03/17/20 128/64   11/19/19 107/63   07/03/19 122/75     OA  Requesting refill on naproxen. Naproxen works appropriately. Health Maintenance   Pt due for mammogram, DXA, and screening lipid panel. Allergies - reviewed: Allergies   Allergen Reactions    Oxycodone-Acetaminophen Other (comments)     felt like \"out of body experience\"  Other reaction(s): Other (comments)  felt like \"out of body experience\"      Percodan [Oxycodone Hcl-Oxycodone-Asa] Other (comments)     felt like \"out of body experience\"         Medications - reviewed:   Current Outpatient Medications   Medication Sig    naproxen (NAPROSYN) 250 mg tablet Take 1 Tab by mouth two (2) times daily as needed for Pain. Take with meals.  glimepiride (AMARYL) 1 mg tablet Take 1/2 tablet by mouth once daily.  lisinopril-hydroCHLOROthiazide (PRINZIDE, ZESTORETIC) 20-25 mg per tablet Take 1 Tab by mouth daily.  azelastine (ASTELIN) 137 mcg (0.1 %) nasal spray 1 [place] by Both Nostrils route two (2) times a day.  Use in each nostril as directed    cyanocobalamin (VITAMIN B12) 500 mcg tablet Take 1 Tab by mouth daily. gummie    multivit with min-folic acid (WOMEN'S MULTIVITAMIN GUMMIES) 200 mcg chew Take  by mouth.  Lactobacillus acidophilus (PROBIOTIC PO) Take  by mouth.  albuterol (PROAIR HFA) 90 mcg/actuation inhaler Take 1 Puff by inhalation every four (4) hours as needed.  loratadine (CLARITIN) 10 mg tablet Take 1 tablet by mouth daily.  albuterol (PROVENTIL VENTOLIN) 2.5 mg /3 mL (0.083 %) nebulizer solution 3 mL by Nebulization route every four (4) hours as needed for Wheezing.  Nebulizer & Compressor machine by Does Not Apply route. q4 hours with medication    gabapentin (NEURONTIN) 100 mg capsule Take 100 mg by mouth daily.  traMADol (ULTRAM) 50 mg tablet Take 50 mg by mouth every six (6) hours as needed for Pain.  gabapentin (NEURONTIN) 300 mg capsule Take 300 mg by mouth nightly. No current facility-administered medications for this visit. Past Medical History - reviewed:  Past Medical History:   Diagnosis Date    Asthma     [facility] [address]     Hx of seasonal allergies     Hypertension          Immunizations - reviewed:   Immunization History   Administered Date(s) Administered    Influenza High Dose Vaccine PF 11/07/2019    Influenza Vaccine (Quad) PF 12/05/2014, 12/01/2015    Influenza Vaccine Split 01/24/2011, 10/31/2011, 10/26/2012    Pneumococcal Conjugate (PCV-13) 06/25/2019         ROS  Review of Systems : A review of systems was performed. All pertinent negatives and positives are mentioned in the HPI. Physical Exam  Visit Vitals  /64 (BP 1 Location: Right arm, BP Patient Position: Sitting)   Pulse 70   Temp 96.9 °F (36.1 °C) (Oral)   Resp 20   Ht 5' 4\" (1.626 m)   Wt 208 lb (94.3 kg)   LMP 01/01/1975   SpO2 95%   BMI 35.70 kg/m²       General appearance - Alert, NAD. Head: Atraumatic. Normocephalic. No lymphadenopathy, no TTP of sinuses  Eyes: EOMI. Sclera white.    Ears: Hearing grossly normal. TM non erythematous with no effusion   Nose: Nares patent, no polyps, mild erythema with some swelling, no mucous production  Neck: No cervical lymphadenopathy or goiter present  Throat: pharynx clear, no exudates. Respiratory - LCTAB. No wheeze/rale/rhonchi  Heart - Normal rate, regular rhythm. No m/r/r  Abdomen - Soft, non tender. Non distended. Neurological - No focal deficits. Speech normal.   Extremities - No LE edema. Distal pulses intact  Skin - normal coloration and normal turgor. No cyanosis, no rash. Assessment/Plan  1. Allergic rhinitis, unspecified seasonality, unspecified trigger - discussed with pt that this is like a flare-up of her allergies from recent environmental exposures. - Continue Claritin and azelastine  - Restart flonase  - Call if symptoms do not improve    2. Gastroesophageal reflux disease, esophagitis presence not specified - discussed changing zantac to famotidine for PRN use of acid reflux.  - Famotidine BID    3. Essential hypertension - BP at goal, continue current regimen.  - CBC WITH AUTOMATED DIFF; Future  - METABOLIC PANEL, COMPREHENSIVE; Future    4. Encounter for screening mammogram for breast cancer  - [facility] MAMMO BI SCREENING INCL CAD; Future    5. Postmenopausal  - DEXA BONE DENSITY STUDY AXIAL; Future    6. Screening for hyperlipidemia  - LIPID PANEL; Future    7. Osteoarthritis of both knees, unspecified osteoarthritis type  - naproxen (NAPROSYN) 250 mg tablet; Take 1 Tab by mouth two (2) times daily as needed for Pain. Take with meals. Dispense: 60 Tab; Refill: 1      I have discussed the aforementioned diagnoses and plan with the patient in detail. I have provided information in person and/or in AVS. All questions answered prior to discharge.     Kishan Aguilar MD  Family Medicine Resident

## 2020-05-12 ENCOUNTER — VIRTUAL VISIT (OUTPATIENT)
Dept: FAMILY MEDICINE CLINIC | Age: 65
End: 2020-05-12

## 2020-05-12 DIAGNOSIS — J30.9 ALLERGIC RHINITIS, UNSPECIFIED SEASONALITY, UNSPECIFIED TRIGGER: Primary | ICD-10-CM

## 2020-05-12 DIAGNOSIS — J30.89 ENVIRONMENTAL AND SEASONAL ALLERGIES: ICD-10-CM

## 2020-05-12 RX ORDER — MINERAL OIL
180 ENEMA (ML) RECTAL DAILY
Qty: 60 TAB | Refills: 0 | Status: SHIPPED | OUTPATIENT
Start: 2020-05-12 | End: 2021-05-10 | Stop reason: ALTCHOICE

## 2020-05-12 NOTE — PROGRESS NOTES
[address] Medicine Residency Attending Addendum:  Dr. Ricky Prince MD,  the patient and I were not physically present during this encounter. The resident and I are concurrently monitoring the patient care through appropriate telecommunication technology. I discussed the findings, assessment and plan with the resident and agree with the resident's findings and plan as documented in the resident's note.       Abhijit Menjivar MD

## 2020-05-12 NOTE — PROGRESS NOTES
April D   76 y.o. female  1955  [address]:    Telemedicine Progress Note  Everardo Choi MD       Encounter Date and Time: May 12, 2020 at 9:28 AM    Consent:  She and/or the health care decision maker is aware that that she may receive a bill for this virtual visit service, depending on her insurance coverage, and has provided verbal consent to proceed: Yes    Chief Complaint   Patient presents with    Nasal Discharge     History of Present Illness   April D Swati Hayes is a 76 y.o. female was evaluated by synchronous (real-time) audio-video technology from her home, through the [facility] Patient Portal.    She states that her allergies have been acting up. She has been taking Claritin and flonase daily. She uses the Azelastine once a week. She took Zyrtec in the past which made her drowsy. She often has postnasal drip and throat clearing. Review of Systems   Review of Systems   Constitutional: Negative for chills, fever and weight loss. Eyes: Negative for blurred vision. Respiratory: Negative for cough and shortness of breath. Cardiovascular: Negative for chest pain, palpitations and leg swelling. Gastrointestinal: Negative for abdominal pain, diarrhea, nausea and vomiting. Genitourinary: Negative for frequency. Musculoskeletal: Negative for falls. Skin: Negative for rash. Neurological: Negative for focal weakness and headaches. Endo/Heme/Allergies: Positive for environmental allergies. Psychiatric/Behavioral: Negative for depression.        Vitals/Objective:     General: alert, cooperative, no distress, clearing throat   Mental  status: mental status: alert, oriented to person, place, and time, normal mood, behavior, speech, dress, motor activity, and thought processes   Resp: resp: normal effort and no respiratory distress   Neuro: neuro: no gross deficits   Skin: skin: no discoloration or lesions of concern on visible areas   Due to this being a TeleHealth evaluation, many elements of the physical examination are unable to be assessed. Assessment and Plan:   Assessment/Plan:  1. Allergic rhinitis, unspecified seasonality, unspecified trigger - discussed with pt that is is not uncommon for the body to become use to an allergy medication. Will transition to allegra and see how she responds. Pt encouraged to continue flonase daily. - fexofenadine (ALLEGRA) 180 mg tablet; Take 1 Tab by mouth daily. Dispense: 60 Tab; Refill: 0    2. Environmental and seasonal allergies  - fexofenadine (ALLEGRA) 180 mg tablet; Take 1 Tab by mouth daily. Dispense: 60 Tab; Refill: 0    Time spent in direct conversation with the patient to include medical condition(s) discussed, assessment and treatment plan:       We discussed the expected course, resolution and complications of the diagnosis(es) in detail. Medication risks, benefits, costs, interactions, and alternatives were discussed as indicated. I advised her to contact the office if her condition worsens, changes or fails to improve as anticipated. She expressed understanding with the diagnosis(es) and plan. Patient understands that this encounter was a temporary measure, and the importance of further follow up and examination was emphasized. Patient verbalized understanding. Patient informed to follow up: 6/2020 for chronic conditions. Electronically Signed: Stanislaw Levy MD    Providers location when delivering service (clinic, hospital, home): Home      Pursuant to the emergency declaration under the [facility] waiver authority and the Coronavirus Preparedness and Response Supplemental Appropriations Act, this Virtual  Visit was conducted, with patient's consent, to reduce the patient's risk of exposure to COVID-19 and provide continuity of care for an established patient.      Services were provided through a video synchronous discussion virtually to substitute for in-person clinic visit. History   Patients past medical, surgical and family histories were reviewed and updated.       Past Medical History:   Diagnosis Date    Asthma     Breast cancer ([facility] [address]     Hx of seasonal allergies     Hypertension      Past Surgical History:   Procedure Laterality Date    BIOPSY BREAST      X3    BREAST SURGERY PROCEDURE UNLISTED      bilateral breast lumpectomy    HX HYSTERECTOMY      HX TONSILLECTOMY       Family History   Problem Relation Age of Onset    Hypertension Mother     Hypertension Father     Dementia Father     Stroke Father     Diabetes Sister      Social History     Socioeconomic History    Marital status:      Spouse name: Not on file    Number of children: Not on file    Years of education: Not on file    Highest education level: Not on file   Occupational History    Not on file   Social Needs    Financial resource strain: Not on file    Food insecurity     Worry: Not on file     Inability: Not on file    Transportation needs     Medical: Not on file     Non-medical: Not on file   Tobacco Use    Smoking status: Former Smoker     Last attempt to quit: 1974     Years since quittin.7    Smokeless tobacco: Never Used   Substance and Sexual Activity    Alcohol use: Yes     Comment: infrequent    Drug use: No    Sexual activity: Never   Lifestyle    Physical activity     Days per week: Not on file     Minutes per session: Not on file    Stress: Not on file   Relationships    Social connections     Talks on phone: Not on file     Gets together: Not on file     Attends [employer] service: Not on file     Active member of club or organization: Not on file     Attends meetings of clubs or organizations: Not on file     Relationship status: Not on file    Intimate partner violence     Fear of current or ex partner: Not on file     Emotionally abused: Not on file     Physically abused: Not on file     Forced sexual activity: Not on file   Other Topics Concern    Not on file   Social History Narrative    Not on file     Patient Active Problem List   Diagnosis Code    Asthma J45.909    HTN (hypertension) I10    AR (allergic rhinitis) J30.9    Alopecia, unspecified L65.9    Mammogram abnormal R92.8    Microscopic hematuria R31.29    Abnormal CPK R74.8    Controlled type 2 diabetes mellitus with complication, without long-term current use of insulin ([facility]) E11.8    Knee pain M25.569    Low back pain M54.5    Severe obesity ([facility]) E66.01          Current Medications/Allergies   Medications and Allergies reviewed:    Current Outpatient Medications   Medication Sig Dispense Refill    fexofenadine (ALLEGRA) 180 mg tablet Take 1 Tab by mouth daily. 60 Tab 0    naproxen (NAPROSYN) 250 mg tablet Take 1 Tab by mouth two (2) times daily as needed for Pain. Take with meals. 60 Tab 1    glimepiride (AMARYL) 1 mg tablet Take 1/2 tablet by mouth once daily. 90 Tab 0    lisinopril-hydroCHLOROthiazide (PRINZIDE, ZESTORETIC) 20-25 mg per tablet Take 1 Tab by mouth daily. 90 Tab 1    azelastine (ASTELIN) 137 mcg (0.1 %) nasal spray 1 [place] by Both Nostrils route two (2) times a day. Use in each nostril as directed 1 Bottle 2    cyanocobalamin (VITAMIN B12) 500 mcg tablet Take 1 Tab by mouth daily. gummie 90 Tab 1    multivit with min-folic acid (WOMEN'S MULTIVITAMIN GUMMIES) 200 mcg chew Take  by mouth.  albuterol (PROAIR HFA) 90 mcg/actuation inhaler Take 1 Puff by inhalation every four (4) hours as needed. 1 Inhaler 6    albuterol (PROVENTIL VENTOLIN) 2.5 mg /3 mL (0.083 %) nebulizer solution 3 mL by Nebulization route every four (4) hours as needed for Wheezing. 1 each 3    Lactobacillus acidophilus (PROBIOTIC PO) Take  by mouth.  Nebulizer & Compressor machine by Does Not Apply route.  q4 hours with medication 1 Each 0     Allergies   Allergen Reactions    Oxycodone-Acetaminophen Other (comments)     felt like \"out of body experience\"  Other reaction(s):  Other (comments)  felt like \"out of body experience\"      Percodan [Oxycodone Hcl-Oxycodone-Asa] Other (comments)     felt like \"out of body experience\"

## 2020-05-28 DIAGNOSIS — J45.40 MODERATE PERSISTENT ASTHMA WITHOUT COMPLICATION: ICD-10-CM

## 2020-06-01 RX ORDER — ALBUTEROL SULFATE 90 UG/1
AEROSOL, METERED RESPIRATORY (INHALATION)
Qty: 1 INHALER | Refills: 6 | Status: SHIPPED | OUTPATIENT
Start: 2020-06-01

## 2020-07-09 ENCOUNTER — VIRTUAL VISIT (OUTPATIENT)
Dept: FAMILY MEDICINE CLINIC | Age: 65
End: 2020-07-09

## 2020-07-09 ENCOUNTER — TELEPHONE (OUTPATIENT)
Dept: FAMILY MEDICINE CLINIC | Age: 65
End: 2020-07-09

## 2020-07-09 DIAGNOSIS — J01.01 ACUTE RECURRENT MAXILLARY SINUSITIS: Primary | ICD-10-CM

## 2020-07-09 RX ORDER — CEFDINIR 300 MG/1
300 CAPSULE ORAL 2 TIMES DAILY
Qty: 14 CAP | Refills: 0 | Status: SHIPPED | OUTPATIENT
Start: 2020-07-09 | End: 2020-07-16

## 2020-07-09 NOTE — PROGRESS NOTES
April D   76 y.o. female  1955  [address] [place] [address]  [id number]   [address]:    Telemedicine Progress Note  [facility]       Encounter Date and Time: July 9, 2020 at 9:58 AM    Consent:  She and/or the health care decision maker is aware that that she may receive a bill for this telephone service, depending on her insurance coverage, and has provided verbal consent to proceed: Yes    No chief complaint on file. History of Present Illness   April D Fredo Jeronimo is a 76 y.o. female was evaluated by synchronous (real-time) audio-video technology from home, through the [facility] Patient Portal.    Patient complains of nasal congestion and right sided sinus pain for 4 days. She has history of seasonal allergies. She has tried Claritin and most recently was prescribed Allegra, but says these pills are too large for her to swallow and she thinks the Claritin works better. . She also tried Zyrtec in the past which made her drowsy. She takes Flonase daily and Astelin weekly. Denies fever, chills, cough, sob, n/v/d. No sick contacts. Says she was last prescribed Cefdinir for a sinus infection over a year ago which worked really well for her. Review of Systems   Review of Systems   Constitutional: Negative for chills, fever, malaise/fatigue and weight loss. HENT: Positive for congestion and sinus pain. Negative for hearing loss and sore throat. Eyes: Negative for blurred vision, double vision and pain. Respiratory: Negative for cough, sputum production, shortness of breath and wheezing. Cardiovascular: Negative for chest pain, palpitations and leg swelling. Gastrointestinal: Negative for abdominal pain, heartburn, nausea and vomiting. Genitourinary: Negative for dysuria and urgency. Musculoskeletal: Negative for back pain, myalgias and neck pain. Skin: Negative for rash. Neurological: Negative for dizziness, weakness and headaches. Endo/Heme/Allergies: Does not bruise/bleed easily. Psychiatric/Behavioral: Negative for depression and suicidal ideas. Vitals/Objective:     General: alert, cooperative, no distress   Mental  status: mental status: alert, oriented to person, place, and time, normal mood, behavior, speech, dress, motor activity, and thought processes   Resp: resp: normal effort and no respiratory distress   Neuro: neuro: no gross deficits   Skin: skin: no discoloration or lesions of concern on visible areas   Due to this being a TeleHealth evaluation, many elements of the physical examination are unable to be assessed. Assessment and Plan:   Time-based coding, delete if not needed: I spent at least 10 minutes with this established patient, and >50% of the time was spent counseling and/or coordinating care regarding sinus infection    1. Acute recurrent maxillary sinusitis: 4 days of worsening right sided sinus pain/tenderness and nasal congestion. Advised patient to start taking antibiotics if symptoms do not improve in 3 days. Augmentin would be first line, but she has trouble swallowing large pills and had success with Cefdinir which was prescribed for a similar sinus infection one year ago. - cefdinir (OMNICEF) 300 mg capsule; Take 1 Cap by mouth two (2) times a day for 7 days. Dispense: 14 Cap; Refill: 0  -Continue Claritin and Flonase    Time spent in direct conversation with the patient to include medical condition(s) discussed, assessment and treatment plan:       We discussed the expected course, resolution and complications of the diagnosis(es) in detail. Medication risks, benefits, costs, interactions, and alternatives were discussed as indicated. I advised her to contact the office if her condition worsens, changes or fails to improve as anticipated. She expressed understanding with the diagnosis(es) and plan.  Patient understands that this encounter was a temporary measure, and the importance of further follow up and examination was emphasized. Patient verbalized understanding. Patient informed to follow up: prn. Electronically Signed: Kerry Dumont DO    Providers location when delivering service (clinic, hospital, home): home    CPT Codes 29292-35372 for Established Patients may apply to this Telehealth Visit. POS code: 18. Modifier GT      Pursuant to the emergency declaration under the [facility] [place] [address] [facility] authority and the [employer] Act, this Virtual  Visit was conducted, with patient's consent, to reduce the patient's risk of exposure to COVID-19 and provide continuity of care for an established patient. Services were provided through a video synchronous discussion virtually to substitute for in-person clinic visit. History   Patients past medical, surgical and family histories were reviewed and updated.       Past Medical History:   Diagnosis Date    Asthma     Breast cancer ([facility] [address]     Hx of seasonal allergies     Hypertension      Past Surgical History:   Procedure Laterality Date    BIOPSY BREAST      X3    BREAST SURGERY PROCEDURE UNLISTED      bilateral breast lumpectomy    HX HYSTERECTOMY      HX TONSILLECTOMY       Family History   Problem Relation Age of Onset    Hypertension Mother     Hypertension Father     Dementia Father     Stroke Father     Diabetes Sister      Social History     Socioeconomic History    Marital status:      Spouse name: Not on file    Number of children: Not on file    Years of education: Not on file    Highest education level: Not on file   Occupational History    Not on file   Social Needs    Financial resource strain: Not on file    Food insecurity     Worry: Not on file     Inability: Not on file    Transportation needs     Medical: Not on file     Non-medical: Not on file   Tobacco Use    Smoking status: Former Smoker     Last attempt to quit: 1974     Years since quittin.8    Smokeless tobacco: Never Used   Substance and Sexual Activity    Alcohol use: Yes     Comment: infrequent    Drug use: No    Sexual activity: Never   Lifestyle    Physical activity     Days per week: Not on file     Minutes per session: Not on file    Stress: Not on file   Relationships    Social connections     Talks on phone: Not on file     Gets together: Not on file     Attends [employer] service: Not on file     Active member of club or organization: Not on file     Attends meetings of clubs or organizations: Not on file     Relationship status: Not on file    Intimate partner violence     Fear of current or ex partner: Not on file     Emotionally abused: Not on file     Physically abused: Not on file     Forced sexual activity: Not on file   Other Topics Concern    Not on file   Social History Narrative    Not on file     Patient Active Problem List   Diagnosis Code    Asthma J45.909    HTN (hypertension) I10    AR (allergic rhinitis) J30.9    Alopecia, unspecified L65.9    Mammogram abnormal R92.8    Microscopic hematuria R31.29    Abnormal CPK R74.8    Controlled type 2 diabetes mellitus with complication, without long-term current use of insulin ([facility]) E11.8    Knee pain M25.569    Low back pain M54.5    Severe obesity ([facility]) E66.01          Current Medications/Allergies   Medications and Allergies reviewed:    Current Outpatient Medications   Medication Sig Dispense Refill    cefdinir (OMNICEF) 300 mg capsule Take 1 Cap by mouth two (2) times a day for 7 days. 14 Cap 0    albuterol (ProAir HFA) 90 mcg/actuation inhaler Take 1 puff every 4 hours as needed for shortness of breath or persistent nighttime cough. 1 Inhaler 6    fexofenadine (ALLEGRA) 180 mg tablet Take 1 Tab by mouth daily. 60 Tab 0    naproxen (NAPROSYN) 250 mg tablet Take 1 Tab by mouth two (2) times daily as needed for Pain. Take with meals.  60 Tab 1    glimepiride (AMARYL) 1 mg tablet Take 1/2 tablet by mouth once daily. 90 Tab 0    lisinopril-hydroCHLOROthiazide (PRINZIDE, ZESTORETIC) 20-25 mg per tablet Take 1 Tab by mouth daily. 90 Tab 1    azelastine (ASTELIN) 137 mcg (0.1 %) nasal spray 1 [place] by Both Nostrils route two (2) times a day. Use in each nostril as directed 1 Bottle 2    cyanocobalamin (VITAMIN B12) 500 mcg tablet Take 1 Tab by mouth daily. gummie 90 Tab 1    multivit with min-folic acid (WOMEN'S MULTIVITAMIN GUMMIES) 200 mcg chew Take  by mouth.  Lactobacillus acidophilus (PROBIOTIC PO) Take  by mouth.  albuterol (PROVENTIL VENTOLIN) 2.5 mg /3 mL (0.083 %) nebulizer solution 3 mL by Nebulization route every four (4) hours as needed for Wheezing. 1 each 3    Nebulizer & Compressor machine by Does Not Apply route. q4 hours with medication 1 Each 0     Allergies   Allergen Reactions    Oxycodone-Acetaminophen Other (comments)     felt like \"out of body experience\"  Other reaction(s):  Other (comments)  felt like \"out of body experience\"      Percodan [Oxycodone Hcl-Oxycodone-Asa] Other (comments)     felt like \"out of body experience\"

## 2020-07-12 NOTE — PROGRESS NOTES
[address] Medicine Residency Attending Addendum:  Dr. Murrell Homes, DO,  the patient and I were not physically present during this encounter. The resident and I are concurrently monitoring the patient care through appropriate telecommunication technology. I discussed the findings, assessment and plan with the resident and agree with the resident's findings and plan as documented in the resident's note.       Vinicio Garcia MD

## 2020-08-10 ENCOUNTER — TELEPHONE (OUTPATIENT)
Dept: FAMILY MEDICINE CLINIC | Age: 65
End: 2020-08-10

## 2020-08-10 DIAGNOSIS — I10 ESSENTIAL HYPERTENSION: ICD-10-CM

## 2020-08-10 DIAGNOSIS — E11.8 CONTROLLED TYPE 2 DIABETES MELLITUS WITH COMPLICATION, WITHOUT LONG-TERM CURRENT USE OF INSULIN (HCC): ICD-10-CM

## 2020-08-10 RX ORDER — LISINOPRIL AND HYDROCHLOROTHIAZIDE 20; 25 MG/1; MG/1
1 TABLET ORAL DAILY
Qty: 10 TAB | Refills: 0 | Status: SHIPPED | OUTPATIENT
Start: 2020-08-10 | End: 2020-08-20 | Stop reason: SDUPTHER

## 2020-08-10 RX ORDER — GLIMEPIRIDE 1 MG/1
TABLET ORAL
Qty: 10 TAB | Refills: 0 | Status: SHIPPED | OUTPATIENT
Start: 2020-08-10 | End: 2020-08-20 | Stop reason: SDUPTHER

## 2020-08-10 RX ORDER — LISINOPRIL AND HYDROCHLOROTHIAZIDE 20; 25 MG/1; MG/1
1 TABLET ORAL DAILY
Qty: 90 TAB | Refills: 1 | Status: SHIPPED | OUTPATIENT
Start: 2020-08-10 | End: 2020-12-09 | Stop reason: SDUPTHER

## 2020-08-10 RX ORDER — GLIMEPIRIDE 1 MG/1
TABLET ORAL
Qty: 90 TAB | Refills: 0 | Status: SHIPPED | OUTPATIENT
Start: 2020-08-10 | End: 2020-11-24 | Stop reason: SDUPTHER

## 2020-08-10 NOTE — TELEPHONE ENCOUNTER
Sent this request to [facility] two weeks ago. They said that they sent it to us and said it was denied. Do not see. Now Pt is going on 3 days without her two medications. Please call Pt. Will need a 10 day supply until she receives it from [facility].

## 2020-08-20 ENCOUNTER — VIRTUAL VISIT (OUTPATIENT)
Dept: FAMILY MEDICINE CLINIC | Age: 65
End: 2020-08-20
Payer: MEDICARE

## 2020-08-20 DIAGNOSIS — E11.8 CONTROLLED TYPE 2 DIABETES MELLITUS WITH COMPLICATION, WITHOUT LONG-TERM CURRENT USE OF INSULIN (HCC): Primary | ICD-10-CM

## 2020-08-20 DIAGNOSIS — I10 ESSENTIAL HYPERTENSION: ICD-10-CM

## 2020-08-20 PROCEDURE — G8428 CUR MEDS NOT DOCUMENT: HCPCS | Performed by: FAMILY MEDICINE

## 2020-08-20 PROCEDURE — 1101F PT FALLS ASSESS-DOCD LE1/YR: CPT | Performed by: FAMILY MEDICINE

## 2020-08-20 PROCEDURE — G8417 CALC BMI ABV UP PARAM F/U: HCPCS | Performed by: FAMILY MEDICINE

## 2020-08-20 PROCEDURE — G8756 NO BP MEASURE DOC: HCPCS | Performed by: FAMILY MEDICINE

## 2020-08-20 PROCEDURE — G9899 SCRN MAM PERF RSLTS DOC: HCPCS | Performed by: FAMILY MEDICINE

## 2020-08-20 PROCEDURE — 1090F PRES/ABSN URINE INCON ASSESS: CPT | Performed by: FAMILY MEDICINE

## 2020-08-20 PROCEDURE — 2022F DILAT RTA XM EVC RTNOPTHY: CPT | Performed by: FAMILY MEDICINE

## 2020-08-20 PROCEDURE — 3046F HEMOGLOBIN A1C LEVEL >9.0%: CPT | Performed by: FAMILY MEDICINE

## 2020-08-20 PROCEDURE — 99214 OFFICE O/P EST MOD 30 MIN: CPT | Performed by: FAMILY MEDICINE

## 2020-08-20 PROCEDURE — G8432 DEP SCR NOT DOC, RNG: HCPCS | Performed by: FAMILY MEDICINE

## 2020-08-20 PROCEDURE — G8400 PT W/DXA NO RESULTS DOC: HCPCS | Performed by: FAMILY MEDICINE

## 2020-08-20 PROCEDURE — G8536 NO DOC ELDER MAL SCRN: HCPCS | Performed by: FAMILY MEDICINE

## 2020-08-20 PROCEDURE — 3017F COLORECTAL CA SCREEN DOC REV: CPT | Performed by: FAMILY MEDICINE

## 2020-08-20 NOTE — PROGRESS NOTES
Lena BASURTO Woody Pendleton is a 76 y.o. female who was seen by synchronous (real-time) audio-video technology. Consent:  Patient and/or their healthcare decision maker is aware that this patient-initiated Telehealth encounter is a billable service, with coverage as determined by their insurance carrier. They are aware that they may receive a bill and have provided verbal consent to proceed: Yes    I was in the office while conducting this encounter. Platform: Doxy. me    HPI: Pt is a 76 y.o. female who presents for f/u DM, HTN and HLD. HTN:  Checking BPs at home?: NO  Headaches?: NO  Blurry vision?: NO  Lower extremity edema?: NO    DM:  Checking BG at home?: YES, sometimes  Logs today?: NO  BG range: 110-150's  Insulin dependent?: NO  Other medications for DM?: Amaryl 0.5mg daily  Symptoms of hypoglycemia?: NO  Aspirin?: NO  ACEi/ARB?: YES  Statin?: NO LDL was very close to goal without medication on last labs. Patient working on diet control.    Smoking?: NO  Last eye exam?: 2019  Last foot exam?: 2019  Last A1c:   Lab Results   Component Value Date/Time    Hemoglobin A1c 6.9 (H) 2019 09:40 AM     LastLDL:   Lab Results   Component Value Date/Time    LDL, calculated 84.6 2020 09:57 AM     Last microalbumin:   Lab Results   Component Value Date/Time    Microalb/Creat ratio (ug/mg creat.) <4.0 2019 05:06 PM           Past Medical History:   Diagnosis Date    Asthma     Breast cancer ([facility] [address]     Hx of seasonal allergies     Hypertension        Family History   Problem Relation Age of Onset    Hypertension Mother     Hypertension Father     Dementia Father     Stroke Father     Diabetes Sister        Social History     Tobacco Use    Smoking status: Former Smoker     Last attempt to quit: 1974     Years since quittin.0    Smokeless tobacco: Never Used   Substance Use Topics    Alcohol use: Yes     Comment: infrequent    Drug use: No       ROS:  Per HPI    PE:  Visit Vitals  LMP 01/01/1975     Gen: Pt in NAD  Head: Normocephalic, atraumatic  Eyes: Sclera anicteric, EOM grossly intact  Throat: MMM  Neck: Supple  Resp: Speaking easily in full sentences without respiratory distress  Extrem: Atraumatic  Neuro: Alert, oriented, appropriate      A/P:   Encounter Diagnoses     ICD-10-CM ICD-9-CM   1. Controlled type 2 diabetes mellitus with complication, without long-term current use of insulin (HCC)  E11.8 250.90   2. Essential hypertension  I10 401.9     1. Essential hypertension: Will have pt get BP checked at triage when she comes in for her bloodwork. - METABOLIC PANEL, BASIC; Future    2. Controlled type 2 diabetes mellitus with complication, without long-term current use of insulin [facility]): Pending results of lipid panel may recommend statin if LDL>70  - HEMOGLOBIN A1C WITH EAG; Future  - LIPID PANEL; Future  - MICROALBUMIN, UR, RAND W/ MICROALB/CREAT RATIO; Future       RTC in 6 months for f/u chronic conditions, or sooner prn    Discussed diagnoses in detail with patient. Medication risks/benefits/side effects discussed with patient. All of the patient's questions were addressed. The patient understands and agrees with our plan of care. The patient knows to call back if they are unsure of or forget any changes we discussed today or if the symptoms change. Lena Heller is a 76 y.o. female being evaluated by a video visit encounter for concerns as above. A caregiver was present when appropriate. Due to this being a TeleHealth encounter (During [facility] public [facility] emergency), evaluation of the following organ systems was limited: Vitals/Constitutional/EENT/Resp/CV/GI//MS/Neuro/Skin/Heme-Lymph-Imm.   Pursuant to the emergency declaration under the [facility] waiver authority and the [employer] Act, this Virtual  Visit was conducted, with patient's (and/or legal guardian's) consent, to reduce the patient's risk of exposure to COVID-19 and provide necessary medical care. Services were provided through a video synchronous discussion virtually to substitute for in-person clinic visit. Patient and provider were located in their home and in the office, respectively. Current Outpatient Medications on File Prior to Visit   Medication Sig Dispense Refill    lisinopril-hydroCHLOROthiazide (PRINZIDE, ZESTORETIC) 20-25 mg per tablet Take 1 Tab by mouth daily. 90 Tab 1    glimepiride (AMARYL) 1 mg tablet Take 1/2 tablet by mouth once daily. 90 Tab 0    albuterol (ProAir HFA) 90 mcg/actuation inhaler Take 1 puff every 4 hours as needed for shortness of breath or persistent nighttime cough. 1 Inhaler 6    fexofenadine (ALLEGRA) 180 mg tablet Take 1 Tab by mouth daily. 60 Tab 0    naproxen (NAPROSYN) 250 mg tablet Take 1 Tab by mouth two (2) times daily as needed for Pain. Take with meals. 60 Tab 1    azelastine (ASTELIN) 137 mcg (0.1 %) nasal spray 1 [place] by Both Nostrils route two (2) times a day. Use in each nostril as directed 1 Bottle 2    cyanocobalamin (VITAMIN B12) 500 mcg tablet Take 1 Tab by mouth daily. gummie 90 Tab 1    multivit with min-folic acid (WOMEN'S MULTIVITAMIN GUMMIES) 200 mcg chew Take  by mouth.  Lactobacillus acidophilus (PROBIOTIC PO) Take  by mouth.  albuterol (PROVENTIL VENTOLIN) 2.5 mg /3 mL (0.083 %) nebulizer solution 3 mL by Nebulization route every four (4) hours as needed for Wheezing. 1 each 3    Nebulizer & Compressor machine by Does Not Apply route. q4 hours with medication 1 Each 0     No current facility-administered medications on file prior to visit.

## 2020-11-03 ENCOUNTER — LAB ONLY (OUTPATIENT)
Dept: FAMILY MEDICINE CLINIC | Age: 65
End: 2020-11-03

## 2020-11-03 ENCOUNTER — TELEPHONE (OUTPATIENT)
Dept: FAMILY MEDICINE CLINIC | Age: 65
End: 2020-11-03

## 2020-11-03 DIAGNOSIS — I10 ESSENTIAL HYPERTENSION: ICD-10-CM

## 2020-11-03 DIAGNOSIS — E11.8 CONTROLLED TYPE 2 DIABETES MELLITUS WITH COMPLICATION, WITHOUT LONG-TERM CURRENT USE OF INSULIN (HCC): ICD-10-CM

## 2020-11-03 LAB
ANION GAP SERPL CALC-SCNC: 7 MMOL/L (ref 5–15)
BUN SERPL-MCNC: 26 MG/DL (ref 6–20)
BUN/CREAT SERPL: 29 (ref 12–20)
CALCIUM SERPL-MCNC: 9.4 MG/DL (ref 8.5–10.1)
CHLORIDE SERPL-SCNC: 105 MMOL/L (ref 97–108)
CHOLEST SERPL-MCNC: 167 MG/DL
CO2 SERPL-SCNC: 28 MMOL/L (ref 21–32)
CREAT SERPL-MCNC: 0.9 MG/DL (ref 0.55–1.02)
CREAT UR-MCNC: 158 MG/DL
EST. AVERAGE GLUCOSE BLD GHB EST-MCNC: 146 MG/DL
GLUCOSE SERPL-MCNC: 101 MG/DL (ref 65–100)
HBA1C MFR BLD: 6.7 % (ref 4–5.6)
HDLC SERPL-MCNC: 59 MG/DL
HDLC SERPL: 2.8 {RATIO} (ref 0–5)
LDLC SERPL CALC-MCNC: 89.4 MG/DL (ref 0–100)
LIPID PROFILE,FLP: NORMAL
MICROALBUMIN UR-MCNC: 0.87 MG/DL
MICROALBUMIN/CREAT UR-RTO: 6 MG/G (ref 0–30)
POTASSIUM SERPL-SCNC: 4.2 MMOL/L (ref 3.5–5.1)
SODIUM SERPL-SCNC: 140 MMOL/L (ref 136–145)
TRIGL SERPL-MCNC: 93 MG/DL (ref ?–150)
VLDLC SERPL CALC-MCNC: 18.6 MG/DL

## 2020-11-03 NOTE — TELEPHONE ENCOUNTER
Patient came in for blood work this morning and said you wanted her blood pressure taken also.   /71 Right Arm

## 2020-11-04 ENCOUNTER — CLINICAL SUPPORT (OUTPATIENT)
Dept: FAMILY MEDICINE CLINIC | Age: 65
End: 2020-11-04
Payer: MEDICARE

## 2020-11-04 VITALS — TEMPERATURE: 98.3 F

## 2020-11-04 DIAGNOSIS — Z23 ENCOUNTER FOR IMMUNIZATION: Primary | ICD-10-CM

## 2020-11-04 PROCEDURE — G0008 ADMIN INFLUENZA VIRUS VAC: HCPCS | Performed by: FAMILY MEDICINE

## 2020-11-04 PROCEDURE — 90694 VACC AIIV4 NO PRSRV 0.5ML IM: CPT

## 2020-11-04 NOTE — PROGRESS NOTES
Pt here for flu shot only and denies any recent illness. VIS available, no questions.  Pt tolerated well

## 2020-11-09 ENCOUNTER — VIRTUAL VISIT (OUTPATIENT)
Dept: FAMILY MEDICINE CLINIC | Age: 65
End: 2020-11-09
Payer: MEDICARE

## 2020-11-09 DIAGNOSIS — Z23 ENCOUNTER FOR IMMUNIZATION: ICD-10-CM

## 2020-11-09 DIAGNOSIS — Z00.00 MEDICARE ANNUAL WELLNESS VISIT, SUBSEQUENT: ICD-10-CM

## 2020-11-09 DIAGNOSIS — Z13.39 SCREENING FOR ALCOHOLISM: Primary | ICD-10-CM

## 2020-11-09 PROCEDURE — 1101F PT FALLS ASSESS-DOCD LE1/YR: CPT | Performed by: FAMILY MEDICINE

## 2020-11-09 PROCEDURE — G8432 DEP SCR NOT DOC, RNG: HCPCS | Performed by: FAMILY MEDICINE

## 2020-11-09 PROCEDURE — G8756 NO BP MEASURE DOC: HCPCS | Performed by: FAMILY MEDICINE

## 2020-11-09 PROCEDURE — 3017F COLORECTAL CA SCREEN DOC REV: CPT | Performed by: FAMILY MEDICINE

## 2020-11-09 PROCEDURE — G8399 PT W/DXA RESULTS DOCUMENT: HCPCS | Performed by: FAMILY MEDICINE

## 2020-11-09 PROCEDURE — G8417 CALC BMI ABV UP PARAM F/U: HCPCS | Performed by: FAMILY MEDICINE

## 2020-11-09 PROCEDURE — G0439 PPPS, SUBSEQ VISIT: HCPCS | Performed by: FAMILY MEDICINE

## 2020-11-09 PROCEDURE — G9899 SCRN MAM PERF RSLTS DOC: HCPCS | Performed by: FAMILY MEDICINE

## 2020-11-09 PROCEDURE — G8536 NO DOC ELDER MAL SCRN: HCPCS | Performed by: FAMILY MEDICINE

## 2020-11-09 PROCEDURE — G8428 CUR MEDS NOT DOCUMENT: HCPCS | Performed by: FAMILY MEDICINE

## 2020-11-09 NOTE — PATIENT INSTRUCTIONS
Medicare Wellness Visit, Female The best way to live healthy is to have a lifestyle where you eat a well-balanced diet, exercise regularly, limit alcohol use, and quit all forms of tobacco/nicotine, if applicable. Regular preventive services are another way to keep healthy. Preventive services (vaccines, screening tests, monitoring & exams) can help personalize your care plan, which helps you manage your own care. Screening tests can find health problems at the earliest stages, when they are easiest to treat. [facility] follows the current, evidence-based guidelines published by the [facility] Tian Dwyer ([facility]) when recommending preventive services for our patients. Because we follow these guidelines, sometimes recommendations change over time as research supports it. (For example, mammograms used to be recommended annually. Even though Medicare will still pay for an annual mammogram, the newer guidelines recommend a mammogram every two years for women of average risk). Of course, you and your doctor may decide to screen more often for some diseases, based on your risk and your co-morbidities (chronic disease you are already diagnosed with). Preventive services for you include: - Medicare offers their members a free annual wellness visit, which is time for you and your primary care provider to discuss and plan for your preventive service needs. Take advantage of this benefit every year! 
-All adults over the age of 72 should receive the recommended pneumonia vaccines. Current USPSTF guidelines recommend a series of two vaccines for the best pneumonia protection.  
-All adults should have a flu vaccine yearly and a tetanus vaccine every 10 years.  
-All adults age 48 and older should receive the shingles vaccines (series of two vaccines). -All adults age 38-68 who are overweight should have a diabetes screening test once every three years. -All adults born between 80 and 1965 should be screened once for Hepatitis C. 
-Other screening tests and preventive services for persons with diabetes include: an eye exam to screen for diabetic retinopathy, a kidney function test, a foot exam, and stricter control over your cholesterol.  
-Cardiovascular screening for adults with routine risk involves an electrocardiogram (ECG) at intervals determined by your doctor.  
-Colorectal cancer screenings should be done for adults age 54-65 with no increased risk factors for colorectal cancer. There are a number of acceptable methods of screening for this type of cancer. Each test has its own benefits and drawbacks. Discuss with your doctor what is most appropriate for you during your annual wellness visit. The different tests include: colonoscopy (considered the best screening method), a fecal occult blood test, a fecal DNA test, and sigmoidoscopy. 
 
-A bone mass density test is recommended when a woman turns 65 to screen for osteoporosis. This test is only recommended one time, as a screening. Some providers will use this same test as a disease monitoring tool if you already have osteoporosis. -Breast cancer screenings are recommended every other year for women of normal risk, age 54-69. 
-Cervical cancer screenings for women over age 72 are only recommended with certain risk factors. Here is a list of your current Health Maintenance items (your personalized list of preventive services) with a due date: 
Health Maintenance Due Topic Date Due  
 DTaP/Tdap/Td  (1 - Tdap) 04/11/1975  Shingles Vaccine (1 of 2) 04/11/2004  Mammogram  04/16/2019 [email or web address] Diabetic Foot Care  05/16/2020 [email or web address] Annual Well Visit  06/25/2020  Pneumococcal Vaccine (2 of 2 - PPSV23) 06/25/2020 Medicare Wellness Visit, Female The best way to live healthy is to have a lifestyle where you eat a well-balanced diet, exercise regularly, limit alcohol use, and quit all forms of tobacco/nicotine, if applicable. Regular preventive services are another way to keep healthy. Preventive services (vaccines, screening tests, monitoring & exams) can help personalize your care plan, which helps you manage your own care. Screening tests can find health problems at the earliest stages, when they are easiest to treat. [facility] follows the current, evidence-based guidelines published by the [facility] Tian Dwyer ([facility]) when recommending preventive services for our patients. Because we follow these guidelines, sometimes recommendations change over time as research supports it. (For example, mammograms used to be recommended annually. Even though Medicare will still pay for an annual mammogram, the newer guidelines recommend a mammogram every two years for women of average risk). Of course, you and your doctor may decide to screen more often for some diseases, based on your risk and your co-morbidities (chronic disease you are already diagnosed with). Preventive services for you include: - Medicare offers their members a free annual wellness visit, which is time for you and your primary care provider to discuss and plan for your preventive service needs. Take advantage of this benefit every year! 
-All adults over the age of 72 should receive the recommended pneumonia vaccines. Current USPSTF guidelines recommend a series of two vaccines for the best pneumonia protection.  
-All adults should have a flu vaccine yearly and a tetanus vaccine every 10 years.  
-All adults age 48 and older should receive the shingles vaccines (series of two vaccines).      
-All adults age 38-68 who are overweight should have a diabetes screening test once every three years.  
-All adults born between 80 and 1965 should be screened once for Hepatitis C. 
-Other screening tests and preventive services for persons with diabetes include: an eye exam to screen for diabetic retinopathy, a kidney function test, a foot exam, and stricter control over your cholesterol.  
-Cardiovascular screening for adults with routine risk involves an electrocardiogram (ECG) at intervals determined by your doctor.  
-Colorectal cancer screenings should be done for adults age 54-65 with no increased risk factors for colorectal cancer. There are a number of acceptable methods of screening for this type of cancer. Each test has its own benefits and drawbacks. Discuss with your doctor what is most appropriate for you during your annual wellness visit. The different tests include: colonoscopy (considered the best screening method), a fecal occult blood test, a fecal DNA test, and sigmoidoscopy. 
 
-A bone mass density test is recommended when a woman turns 65 to screen for osteoporosis. This test is only recommended one time, as a screening. Some providers will use this same test as a disease monitoring tool if you already have osteoporosis. -Breast cancer screenings are recommended every other year for women of normal risk, age 54-69. 
-Cervical cancer screenings for women over age 72 are only recommended with certain risk factors. Here is a list of your current Health Maintenance items (your personalized list of preventive services) with a due date: 
Health Maintenance Due Topic Date Due  
 DTaP/Tdap/Td  (1 - Tdap) 04/11/1975  Shingles Vaccine (1 of 2) 04/11/2004  Mammogram  04/16/2019 [address] [place] [address] Diabetic Foot Care  05/16/2020 [address] [place] [address] Annual Well Visit  06/25/2020  Pneumococcal Vaccine (2 of 2 - PPSV23) 06/25/2020

## 2020-11-09 NOTE — PROGRESS NOTES
Discussed results with pt at her visit today. She would like to try diet and lifestyle modifications for the cholesterol since she is close to the LDL goal of 70 and she has been eating a lot of fried food over the past 6 months because of COVID. Rest of labs stable.

## 2020-11-09 NOTE — PROGRESS NOTES
This is the Subsequent Medicare Annual Wellness Exam, performed 12 months or more after the Initial AWV or the last Subsequent AWV    I have reviewed the patient's medical history in detail and updated the computerized patient record. History     Patient Active Problem List   Diagnosis Code    Asthma J45.909    HTN (hypertension) I10    AR (allergic rhinitis) J30.9    Alopecia, unspecified L65.9    Mammogram abnormal R92.8    Microscopic hematuria R31.29    Abnormal CPK R74.8    Controlled type 2 diabetes mellitus with complication, without long-term current use of insulin (HCC) E11.8    Knee pain M25.569    Low back pain M54.5    Severe obesity ([facility] [address] E66.01     Past Medical History:   Diagnosis Date    Asthma     Breast cancer ([facility] [address]     Hx of seasonal allergies     Hypertension       Past Surgical History:   Procedure Laterality Date    BIOPSY BREAST      X3    BREAST SURGERY PROCEDURE UNLISTED      bilateral breast lumpectomy    HX HYSTERECTOMY      HX TONSILLECTOMY       Current Outpatient Medications   Medication Sig Dispense Refill    diph,Pertuss,Acell,,Tet Vac-PF (ADACEL) 2 Lf-(2.5-5-3-5 mcg)-5Lf/0.5 mL susp 0.5 mL by IntraMUSCular route once for 1 dose. 1 Syringe 0    lisinopril-hydroCHLOROthiazide (PRINZIDE, ZESTORETIC) 20-25 mg per tablet Take 1 Tab by mouth daily. 90 Tab 1    glimepiride (AMARYL) 1 mg tablet Take 1/2 tablet by mouth once daily. 90 Tab 0    albuterol (ProAir HFA) 90 mcg/actuation inhaler Take 1 puff every 4 hours as needed for shortness of breath or persistent nighttime cough. 1 Inhaler 6    fexofenadine (ALLEGRA) 180 mg tablet Take 1 Tab by mouth daily. 60 Tab 0    naproxen (NAPROSYN) 250 mg tablet Take 1 Tab by mouth two (2) times daily as needed for Pain. Take with meals. 60 Tab 1    azelastine (ASTELIN) 137 mcg (0.1 %) nasal spray 1 [place] by Both Nostrils route two (2) times a day.  Use in each nostril as directed 1 Bottle 2    cyanocobalamin (VITAMIN B12) 500 mcg tablet Take 1 Tab by mouth daily. gummie 90 Tab 1    multivit with min-folic acid (WOMEN'S MULTIVITAMIN GUMMIES) 200 mcg chew Take  by mouth.  Lactobacillus acidophilus (PROBIOTIC PO) Take  by mouth.  albuterol (PROVENTIL VENTOLIN) 2.5 mg /3 mL (0.083 %) nebulizer solution 3 mL by Nebulization route every four (4) hours as needed for Wheezing. 1 each 3    Nebulizer & Compressor machine by Does Not Apply route. q4 hours with medication 1 Each 0     Allergies   Allergen Reactions    Oxycodone-Acetaminophen Other (comments)     felt like \"out of body experience\"  Other reaction(s):  Other (comments)  felt like \"out of body experience\"     Rhetta Hung [Oxycodone Hcl-Oxycodone-Asa] Other (comments)     felt like \"out of body experience\"       Family History   Problem Relation Age of Onset    Hypertension Mother     Hypertension Father     Dementia Father     Stroke Father     Diabetes Sister      Social History     Tobacco Use    Smoking status: Former Smoker     Last attempt to quit: 1974     Years since quittin.4    Smokeless tobacco: Never Used   Substance Use Topics    Alcohol use: Yes     Comment: infrequent       Depression Risk Factor Screening:     3 most recent PHQ Screens 3/17/2020   Little interest or pleasure in doing things Not at all   Feeling down, depressed, irritable, or hopeless Not at all   Total Score PHQ 2 0   Trouble falling or staying asleep, or sleeping too much -   Feeling tired or having little energy -   Poor appetite, weight loss, or overeating -   Feeling bad about yourself - or that you are a failure or have let yourself or your family down -   Trouble concentrating on things such as school, work, reading, or watching TV -   Moving or speaking so slowly that other people could have noticed; or the opposite being so fidgety that others notice -   Thoughts of being better off dead, or hurting yourself in some way -   PHQ 9 Score -   How difficult have these problems made it for you to do your work, take care of your home and get along with others -       Alcohol Risk Screen   Do you average more than 1 drink per night or more than 7 drinks a week:  No    On any one occasion in the past three months have you have had more than 3 drinks containing alcohol:  No        Functional Ability and Level of Safety:   Hearing: Hearing is good. Activities of Daily Living: The home contains: handrails  Patient does total self care     Ambulation: with no difficulty     Fall Risk:  Fall Risk Assessment, last 12 mths 3/17/2020   Able to walk? Yes   Fall in past 12 months? No     Abuse Screen:  Patient is not abused       Cognitive Screening   Has your family/caregiver stated any concerns about your memory: no    Patient Care Team   Patient Care Team:  Cathryn Hurtado MD as PCP - General (Family Medicine)  Cathryn Hurtado MD as PCP - [facility] Jackie Gracia Provider    Assessment/Plan   Education and counseling provided:  Are appropriate based on today's review and evaluation  Pneumococcal Vaccine  Shingles, TDaP    Diagnoses and all orders for this visit:    1. Screening for alcoholism  -     AK ANNUAL ALCOHOL SCREEN 15 MIN    2. Medicare annual wellness visit, subsequent    3. Encounter for immunization  -     diph,Pertuss,Acell,,Tet Vac-PF (ADACEL) 2 Lf-(2.5-5-3-5 mcg)-5Lf/0.5 mL susp; 0.5 mL by IntraMUSCular route once for 1 dose. Health Maintenance Due   Topic Date Due    DTaP/Tdap/Td series (1 - Tdap) 04/11/1975    Shingrix Vaccine Age 50> (1 of 2) 04/11/2004    Breast Cancer Screen Mammogram  04/16/2019    Foot Exam Q1  05/16/2020    Medicare Yearly Exam  06/25/2020    Pneumococcal 65+ years (2 of 2 - PPSV23) 06/25/2020 April D Susana Dennis, who was evaluated through a synchronous (real-time) audio only encounter, and/or her healthcare decision maker, is aware that it is a billable service, with coverage as determined by her insurance carrier.  She provided verbal consent to proceed: Yes, and patient identification was verified. It was conducted pursuant to the emergency declaration under the [address] [place] [address] and the [employer] and [employer] Act. A caregiver was present when appropriate. Ability to conduct physical exam was limited. I was in the office. The patient was at home.     Nia Gordon MD

## 2020-11-24 DIAGNOSIS — E11.8 CONTROLLED TYPE 2 DIABETES MELLITUS WITH COMPLICATION, WITHOUT LONG-TERM CURRENT USE OF INSULIN (HCC): ICD-10-CM

## 2020-11-24 RX ORDER — GLIMEPIRIDE 1 MG/1
TABLET ORAL
Qty: 45 TAB | Refills: 1 | Status: SHIPPED | OUTPATIENT
Start: 2020-11-24 | End: 2020-12-01 | Stop reason: SDUPTHER

## 2020-12-01 ENCOUNTER — TELEPHONE (OUTPATIENT)
Dept: FAMILY MEDICINE CLINIC | Age: 65
End: 2020-12-01

## 2020-12-01 DIAGNOSIS — E11.8 CONTROLLED TYPE 2 DIABETES MELLITUS WITH COMPLICATION, WITHOUT LONG-TERM CURRENT USE OF INSULIN (HCC): ICD-10-CM

## 2020-12-01 RX ORDER — GLIMEPIRIDE 1 MG/1
TABLET ORAL
Qty: 45 TAB | Refills: 1 | Status: SHIPPED | OUTPATIENT
Start: 2020-12-01 | End: 2021-05-11 | Stop reason: ALTCHOICE

## 2020-12-01 NOTE — TELEPHONE ENCOUNTER
----- Message from [place] V sent at 2020  1:12 PM EST -----  Regarding: Dr. Dinah Armas  General Message/Vendor Calls  To: [facility]  Subject: Dr. Dinah Armas  Patient's first and last name: April Aung  : 53                                                       MRN number: [id number]    Caller's first and last name: Bigg [place] [facility]. Reason for call: Update on rx refill  Callback required yes/no and why: Yes  Best contact number(s): [phone]  Details to clarify the request: Caller would like to an update on pt's rx, \"Glimepiride - 1mg tablet\".

## 2020-12-09 DIAGNOSIS — M17.0 OSTEOARTHRITIS OF BOTH KNEES, UNSPECIFIED OSTEOARTHRITIS TYPE: ICD-10-CM

## 2020-12-09 DIAGNOSIS — I10 ESSENTIAL HYPERTENSION: ICD-10-CM

## 2020-12-09 NOTE — TELEPHONE ENCOUNTER
Need a 90 day supply with Refills -  Will have to do a refill at her local Pharmacy as well for a two-week supply of both medications please.

## 2020-12-09 NOTE — TELEPHONE ENCOUNTER
Will need a two week prescription sent over to the [facility] here to last her until she receives her supply from [facility] order today as well.

## 2020-12-11 RX ORDER — NAPROXEN 250 MG/1
250 TABLET ORAL
Qty: 30 TAB | Refills: 0 | Status: SHIPPED | OUTPATIENT
Start: 2020-12-11 | End: 2022-09-16 | Stop reason: SDUPTHER

## 2020-12-11 RX ORDER — LISINOPRIL AND HYDROCHLOROTHIAZIDE 20; 25 MG/1; MG/1
1 TABLET ORAL DAILY
Qty: 15 TAB | Refills: 0 | Status: SHIPPED | OUTPATIENT
Start: 2020-12-11 | End: 2021-04-19 | Stop reason: SDUPTHER

## 2020-12-11 RX ORDER — LISINOPRIL AND HYDROCHLOROTHIAZIDE 20; 25 MG/1; MG/1
1 TABLET ORAL DAILY
Qty: 14 TAB | Refills: 0 | OUTPATIENT
Start: 2020-12-11

## 2020-12-11 RX ORDER — NAPROXEN 250 MG/1
250 TABLET ORAL
Qty: 14 TAB | Refills: 0 | OUTPATIENT
Start: 2020-12-11

## 2020-12-11 RX ORDER — LISINOPRIL AND HYDROCHLOROTHIAZIDE 20; 25 MG/1; MG/1
1 TABLET ORAL DAILY
Qty: 90 TAB | Refills: 1 | Status: SHIPPED | OUTPATIENT
Start: 2020-12-11 | End: 2020-12-11 | Stop reason: SDUPTHER

## 2020-12-11 RX ORDER — NAPROXEN 250 MG/1
250 TABLET ORAL
Qty: 90 TAB | Refills: 0 | Status: SHIPPED | OUTPATIENT
Start: 2020-12-11 | End: 2020-12-11 | Stop reason: SDUPTHER

## 2020-12-16 ENCOUNTER — TELEPHONE (OUTPATIENT)
Dept: FAMILY MEDICINE CLINIC | Age: 65
End: 2020-12-16

## 2020-12-16 NOTE — TELEPHONE ENCOUNTER
[facility] was wondering if you had received the fax on this pt on 12/11?  Please call or fax [phone]

## 2021-02-02 ENCOUNTER — VIRTUAL VISIT (OUTPATIENT)
Dept: FAMILY MEDICINE CLINIC | Age: 66
End: 2021-02-02

## 2021-02-02 DIAGNOSIS — J32.9 BACTERIAL SINUSITIS: Primary | ICD-10-CM

## 2021-02-02 DIAGNOSIS — B96.89 BACTERIAL SINUSITIS: Primary | ICD-10-CM

## 2021-02-02 RX ORDER — CEFDINIR 300 MG/1
300 CAPSULE ORAL 2 TIMES DAILY
Qty: 20 CAP | Refills: 0 | Status: SHIPPED | OUTPATIENT
Start: 2021-02-02 | End: 2021-02-12

## 2021-02-02 NOTE — PROGRESS NOTES
Chief Complaint   Patient presents with    Sinus Infection     1. Have you been to the ER, urgent care clinic since your last visit? Hospitalized since your last visit? No    2. Have you seen or consulted any other health care providers outside of the [address] [place] [address] since your last visit? Include any pap smears or colon screening.  No    Reviewed record in preparation for visit and have necessary documentation  Pt did not bring medication to office visit for review  opportunity was given for questions  Goals that were addressed and/or need to be completed during or after this appointment include   Health Maintenance Due   Topic Date Due    COVID-19 Vaccine (1 of 2) 04/11/1970    DTaP/Tdap/Td series (1 - Tdap) 04/11/1975    Shingrix Vaccine Age 50> (1 of 2) 04/11/2004    Breast Cancer Screen Mammogram  04/16/2019    Foot Exam Q1  05/16/2020    Pneumococcal 65+ years (2 of 2 - PPSV23) 06/25/2020

## 2021-02-02 NOTE — PROGRESS NOTES
Lena Jaime Luca is a 76 y.o. female evaluated via Doximetry on 21. Patient Identity confirmed by . Consent:  He and/or health care decision maker is aware that that he may receive a bill for this telephone service, depending on his insurance coverage, and has provided verbal consent to proceed: Yes    Physician Location: Office  Patient Location: Home  Nurse Assisting with Encounter: Stacy Aschoff, LPN    Chief Complaint   Patient presents with    Sinus Infection      Information gathered from patient and/or health care decision maker. HPI:   Sinusitis: Patient presents with sinusitis symptoms over the last 14 days. Patient reports symptoms including nasal congestion, purulent rhinorrhea, facial pain and denies cough, sneezing, fevers, sore throats, spitting/vomiting mucous. Patient describes pain of the bilateral sinus(es). Patient does report Viral URI prior to developing these symptoms. Previous OTC treatments include Allergies and nasal sprays which have been minimally effective in treating symptoms. she denies recent sick contacts. she does not have history of recurrent sinusitis. - Last antibiotic use: Not recently     Review of Systems   Constitutional: Negative for chills and fever. HENT: Positive for congestion and sinus pain. Negative for sore throat. Respiratory: Negative for cough. Gastrointestinal: Negative for nausea and vomiting. Limited Exam:  Due to this being a TeleHealth evaluation, many elements of the physical examination are unable to be assessed.       Constitutional: Appears well-developed and well-nourished in no apparent distress   Mental status: Alert and awake, Oriented to person/place/time, Able to follow commands  Eyes: EOM normal, Sclera normal, No visible ocular discharge  HENT: Normocephalic, atraumatic; Mouth/Throat: Moist mucous membranes, External Ears normal  Neck: No visualized mass  Pulmonary/Chest: Respiratory effort normal, No visualized signs of difficulty breathing or respiratory distress   Musculoskeletal: Normal gait with no signs of ataxia, Normal range of motion of neck  Neurological: No facial asymmetry (Cranial nerve 7 motor function), No gaze palsy  Skin: No significant exanthematous lesions or discoloration noted on facial skin  Psychiatric: Normal affect, normal judgment/insight. No hallucinations     Current Outpatient Medications on File Prior to Visit   Medication Sig Dispense Refill    lisinopril-hydroCHLOROthiazide (PRINZIDE, ZESTORETIC) 20-25 mg per tablet Take 1 Tab by mouth daily. 15 Tab 0    naproxen (NAPROSYN) 250 mg tablet Take 1 Tab by mouth two (2) times daily as needed for Pain. Take with meals. 30 Tab 0    glimepiride (AMARYL) 1 mg tablet Take 1/2 tablet by mouth once daily. 45 Tab 1    albuterol (ProAir HFA) 90 mcg/actuation inhaler Take 1 puff every 4 hours as needed for shortness of breath or persistent nighttime cough. 1 Inhaler 6    azelastine (ASTELIN) 137 mcg (0.1 %) nasal spray 1 [place] by Both Nostrils route two (2) times a day. Use in each nostril as directed 1 Bottle 2    cyanocobalamin (VITAMIN B12) 500 mcg tablet Take 1 Tab by mouth daily. gummie 90 Tab 1    multivit with min-folic acid (WOMEN'S MULTIVITAMIN GUMMIES) 200 mcg chew Take  by mouth.  Lactobacillus acidophilus (PROBIOTIC PO) Take  by mouth.  albuterol (PROVENTIL VENTOLIN) 2.5 mg /3 mL (0.083 %) nebulizer solution 3 mL by Nebulization route every four (4) hours as needed for Wheezing. 1 each 3    Nebulizer & Compressor machine by Does Not Apply route. q4 hours with medication 1 Each 0    fexofenadine (ALLEGRA) 180 mg tablet Take 1 Tab by mouth daily. 60 Tab 0     No current facility-administered medications on file prior to visit. Allergies   Allergen Reactions    Oxycodone-Acetaminophen Other (comments)     felt like \"out of body experience\"  Other reaction(s):  Other (comments)  felt like \"out of body experience\"      Percodan [Oxycodone Hcl-Oxycodone-Asa] Other (comments)     felt like \"out of body experience\"        Patient Active Problem List    Diagnosis Date Noted    Severe obesity ([facility] [address] 11/26/2019    Knee pain 07/03/2019    Low back pain 07/03/2019    Controlled type 2 diabetes mellitus with complication, without long-term current use of insulin ([facility] [address] 01/10/2019    Abnormal CPK 04/17/2012    Asthma 05/11/2010    HTN (hypertension) 05/11/2010    AR (allergic rhinitis) 05/11/2010    Alopecia, unspecified 05/11/2010    Mammogram abnormal 05/11/2010    Microscopic hematuria 06/11/2002        Health Maintenance Due   Topic Date Due    COVID-19 Vaccine (1 of 2) 04/11/1970    DTaP/Tdap/Td series (1 - Tdap) 04/11/1975    Shingrix Vaccine Age 50> (1 of 2) 04/11/2004    Breast Cancer Screen Mammogram  04/16/2019    Foot Exam Q1  05/16/2020    Pneumococcal 65+ years (2 of 2 - PPSV23) 06/25/2020        Assessment/Plan:  Details of this discussion including any medical advice provided: Given history and symptoms, starting empiric antibiotics for bacterial sinusitis. Advised patient on OTC symptomatic therapies for sinusitis and discussed red flag symptoms for which to call or return to clinic. See orders for details. ICD-10-CM ICD-9-CM    1. Bacterial sinusitis  J32.9 473.9     B96.89 041.9      Follow-up and Dispositions    · Return if symptoms worsen or fail to improve. Total Time: minutes: 5-10 minutes was spent on telemedicine encounter discussing above problems and plans. Patient Problem list, medications, and Allergies were reviewed during this encounter.     Pursuant to the emergency declaration under the [facility], 1135 waiver authority and the [employer] Act, this Telephone Visit was conducted, with patient's consent, to reduce the patient's risk of exposure to COVID-19 and provide continuity of care for an established patient. I affirm this is a Patient Initiated Episode with an Established Patient who has not had a related appointment within my department in the past 7 days or scheduled within the next 24 hours. Discussed diagnoses in detail with patient. Medication risks/benefits/side effects discussed with patient. All of the patient's questions were addressed. The patient understands and agrees with our plan of care. The patient knows to call back if they are unsure of or forget any changes we discussed today or if the symptoms change.     Note: not billable if this call serves to triage the patient into an appointment for the relevant concern    MD MELANIE Brooks & DAVIDE BRIONES [facility]  02/02/21

## 2021-04-19 DIAGNOSIS — I10 ESSENTIAL HYPERTENSION: ICD-10-CM

## 2021-04-20 RX ORDER — LISINOPRIL AND HYDROCHLOROTHIAZIDE 20; 25 MG/1; MG/1
1 TABLET ORAL DAILY
Qty: 90 TAB | Refills: 0 | Status: SHIPPED | OUTPATIENT
Start: 2021-04-20 | End: 2021-08-17

## 2021-05-10 ENCOUNTER — OFFICE VISIT (OUTPATIENT)
Dept: FAMILY MEDICINE CLINIC | Age: 66
End: 2021-05-10

## 2021-05-10 VITALS
SYSTOLIC BLOOD PRESSURE: 126 MMHG | RESPIRATION RATE: 18 BRPM | TEMPERATURE: 98.5 F | OXYGEN SATURATION: 99 % | HEART RATE: 64 BPM | DIASTOLIC BLOOD PRESSURE: 74 MMHG | BODY MASS INDEX: 34.66 KG/M2 | WEIGHT: 203 LBS | HEIGHT: 64 IN

## 2021-05-10 DIAGNOSIS — I10 ESSENTIAL HYPERTENSION: ICD-10-CM

## 2021-05-10 DIAGNOSIS — E11.8 CONTROLLED TYPE 2 DIABETES MELLITUS WITH COMPLICATION, WITHOUT LONG-TERM CURRENT USE OF INSULIN (HCC): Primary | ICD-10-CM

## 2021-05-10 DIAGNOSIS — E78.2 MIXED HYPERLIPIDEMIA: ICD-10-CM

## 2021-05-10 DIAGNOSIS — E66.01 SEVERE OBESITY (HCC): ICD-10-CM

## 2021-05-10 LAB
ALBUMIN SERPL-MCNC: 4 G/DL (ref 3.5–5)
ALBUMIN/GLOB SERPL: 1.2 {RATIO} (ref 1.1–2.2)
ALP SERPL-CCNC: 71 U/L (ref 45–117)
ALT SERPL-CCNC: 39 U/L (ref 12–78)
ANION GAP SERPL CALC-SCNC: 3 MMOL/L (ref 5–15)
AST SERPL-CCNC: 28 U/L (ref 15–37)
BILIRUB SERPL-MCNC: 0.4 MG/DL (ref 0.2–1)
BUN SERPL-MCNC: 20 MG/DL (ref 6–20)
BUN/CREAT SERPL: 23 (ref 12–20)
CALCIUM SERPL-MCNC: 9.5 MG/DL (ref 8.5–10.1)
CHLORIDE SERPL-SCNC: 106 MMOL/L (ref 97–108)
CHOLEST SERPL-MCNC: 174 MG/DL
CO2 SERPL-SCNC: 30 MMOL/L (ref 21–32)
CREAT SERPL-MCNC: 0.86 MG/DL (ref 0.55–1.02)
CREAT UR-MCNC: 179 MG/DL
EST. AVERAGE GLUCOSE BLD GHB EST-MCNC: 140 MG/DL
GLOBULIN SER CALC-MCNC: 3.4 G/DL (ref 2–4)
GLUCOSE SERPL-MCNC: 103 MG/DL (ref 65–100)
HBA1C MFR BLD: 6.5 % (ref 4–5.6)
HDLC SERPL-MCNC: 64 MG/DL
HDLC SERPL: 2.7 {RATIO} (ref 0–5)
LDLC SERPL CALC-MCNC: 93.6 MG/DL (ref 0–100)
LIPID PROFILE,FLP: NORMAL
MICROALBUMIN UR-MCNC: 1.2 MG/DL
MICROALBUMIN/CREAT UR-RTO: 7 MG/G (ref 0–30)
POTASSIUM SERPL-SCNC: 4 MMOL/L (ref 3.5–5.1)
PROT SERPL-MCNC: 7.4 G/DL (ref 6.4–8.2)
SODIUM SERPL-SCNC: 139 MMOL/L (ref 136–145)
TRIGL SERPL-MCNC: 82 MG/DL (ref ?–150)
VLDLC SERPL CALC-MCNC: 16.4 MG/DL

## 2021-05-10 PROCEDURE — G8399 PT W/DXA RESULTS DOCUMENT: HCPCS | Performed by: FAMILY MEDICINE

## 2021-05-10 PROCEDURE — G8536 NO DOC ELDER MAL SCRN: HCPCS | Performed by: FAMILY MEDICINE

## 2021-05-10 PROCEDURE — G8510 SCR DEP NEG, NO PLAN REQD: HCPCS | Performed by: FAMILY MEDICINE

## 2021-05-10 PROCEDURE — G9899 SCRN MAM PERF RSLTS DOC: HCPCS | Performed by: FAMILY MEDICINE

## 2021-05-10 PROCEDURE — 3046F HEMOGLOBIN A1C LEVEL >9.0%: CPT | Performed by: FAMILY MEDICINE

## 2021-05-10 PROCEDURE — 99214 OFFICE O/P EST MOD 30 MIN: CPT | Performed by: FAMILY MEDICINE

## 2021-05-10 PROCEDURE — 3017F COLORECTAL CA SCREEN DOC REV: CPT | Performed by: FAMILY MEDICINE

## 2021-05-10 PROCEDURE — G8754 DIAS BP LESS 90: HCPCS | Performed by: FAMILY MEDICINE

## 2021-05-10 PROCEDURE — G8417 CALC BMI ABV UP PARAM F/U: HCPCS | Performed by: FAMILY MEDICINE

## 2021-05-10 PROCEDURE — 1101F PT FALLS ASSESS-DOCD LE1/YR: CPT | Performed by: FAMILY MEDICINE

## 2021-05-10 PROCEDURE — 2022F DILAT RTA XM EVC RTNOPTHY: CPT | Performed by: FAMILY MEDICINE

## 2021-05-10 PROCEDURE — G8752 SYS BP LESS 140: HCPCS | Performed by: FAMILY MEDICINE

## 2021-05-10 PROCEDURE — G8427 DOCREV CUR MEDS BY ELIG CLIN: HCPCS | Performed by: FAMILY MEDICINE

## 2021-05-10 PROCEDURE — 1090F PRES/ABSN URINE INCON ASSESS: CPT | Performed by: FAMILY MEDICINE

## 2021-05-10 RX ORDER — MELATONIN
1000 DAILY
COMMUNITY

## 2021-05-10 RX ORDER — LORATADINE 10 MG/1
10 TABLET ORAL DAILY
COMMUNITY

## 2021-05-10 NOTE — PROGRESS NOTES
CC: f/u HTN, HLD and DM    HPI: Pt is a 78 y.o. female who presents for f/u HTN, HLD and DM. Mammogram done in [place]    HTN:  Checking BPs at home?: YES  Logs today?: NO  Range of BPs?: 120's/70's  Adding salt to foods?: Sometimes  Headaches?: NO  Blurry vision?: YES  Lower extremity edema?: NO    DM:  Checking BG at home?: YES  Logs today?: NO  BG range: 60's-149  Insulin dependent?: NO  Other medications for DM?: Amaryl 0.5mg daily in the mornings  Symptoms of hypoglycemia?: YES, 1-2 times per month as low as the 60's.   Aspirin?: NO  ACEi/ARB?: YES  Statin?: NO  Last eye exam?: Just had done - dropped off records  Last foot exam?: 2019  Last A1c:   Lab Results   Component Value Date/Time    Hemoglobin A1c 6.7 (H) 2020 08:31 AM     LastLDL:   Lab Results   Component Value Date/Time    LDL, calculated 89.4 2020 08:31 AM     Last microalbumin:   Lab Results   Component Value Date/Time    Microalbumin/Creat ratio (mg/g creat) 6 2020 08:31 AM    Microalbumin,urine random 0.87 2020 08:31 AM           Past Medical History:   Diagnosis Date    Asthma     [facility] cancer ([facility] [address]     Diabetes ([facility] [address]     Hx of seasonal allergies     Hypertension        Family History   Problem Relation Age of Onset    Hypertension Mother     Hypertension Father     Dementia Father     Stroke Father     Diabetes Sister        Social History     Tobacco Use    Smoking status: Former Smoker     Quit date: 1974     Years since quittin.7    Smokeless tobacco: Never Used   Substance Use Topics    Alcohol use: Yes     Comment: infrequent    Drug use: No       ROS:  Per HPI    PE:  Visit Vitals  /74 (BP 1 Location: Left upper arm, BP Patient Position: Sitting, BP Cuff Size: Adult)   Pulse 64   Temp 98.5 °F (36.9 °C) (Oral)   Resp 18   Ht 5' 4\" (1.626 m)   Wt 203 lb (92.1 kg)   LMP 1975   SpO2 99%   BMI 34.84 kg/m²     Gen: Pt sitting in chair, in NAD  Head: Normocephalic, atraumatic  Eyes: Sclera anicteric, EOM grossly intact, PERRL  Throat: Mask in place  Neck: Supple, no LAD, no thyromegaly or carotid bruits  CVS: Normal S1, S2, no m/r/g  Resp: CTAB, no wheezes or rales  Extrem: Atraumatic, no cyanosis or edema  Pulses: 2+   Skin: Warm, dry  Feet: Skin intact, no lesions. DP pulses 2+ b/l. Sensation intact to light touch and monofilament throughout. Neuro: Alert, oriented, appropriate      A/P:   Encounter Diagnoses     ICD-10-CM ICD-9-CM   1. Controlled type 2 diabetes mellitus with complication, without long-term current use of insulin ([facility])  E11.8 250.90   2. Essential hypertension  I10 401.9   3. Mixed hyperlipidemia  E78.2 272.2   4. Severe obesity ([facility] [address]  E66.01 278.01     1. Controlled type 2 diabetes mellitus with complication, without long-term current use of insulin ([facility] [address]): Pt having episodes of hypoglycemia and is on a very low dose of Amaryl. Advised her to hold the Amaryl until I call her with lab results but will likely stop the Amaryl altogether.   - HEMOGLOBIN A1C WITH EAG; Future  - MICROALBUMIN, UR, RAND W/ MICROALB/CREAT RATIO; Future  - HM DIABETES FOOT EXAM  - MICROALBUMIN, UR, RAND W/ MICROALB/CREAT RATIO  - HEMOGLOBIN A1C WITH EAG    2. Essential hypertension: Well-controlled  - METABOLIC PANEL, COMPREHENSIVE; Future  - METABOLIC PANEL, COMPREHENSIVE    3. Mixed hyperlipidemia: Stable  - LIPID PANEL; Future  - LIPID PANEL    4. Severe obesity (HCC)       RTC in 6 months for f/u chronic conditions, or sooner prn    Discussed diagnoses in detail with patient. Medication risks/benefits/side effects discussed with patient. All of the patient's questions were addressed. The patient understands and agrees with our plan of care. The patient knows to call back if they are unsure of or forget any changes we discussed today or if the symptoms change.   The patient received an After-Visit Summary which contains VS, orders, medication list and allergy list. This can be used as a \"mini-medical record\" should they have to seek medical care while out of town. Current Outpatient Medications on File Prior to Visit   Medication Sig Dispense Refill    cholecalciferol (Vitamin D3) (1000 Units /25 mcg) tablet Take 1,000 Units by mouth daily.  loratadine (Claritin) 10 mg tablet Take 10 mg by mouth daily.  lisinopril-hydroCHLOROthiazide (PRINZIDE, ZESTORETIC) 20-25 mg per tablet Take 1 Tab by mouth daily. 90 Tab 0    naproxen (NAPROSYN) 250 mg tablet Take 1 Tab by mouth two (2) times daily as needed for Pain. Take with meals. 30 Tab 0    glimepiride (AMARYL) 1 mg tablet Take 1/2 tablet by mouth once daily. 45 Tab 1    albuterol (ProAir HFA) 90 mcg/actuation inhaler Take 1 puff every 4 hours as needed for shortness of breath or persistent nighttime cough. 1 Inhaler 6    azelastine (ASTELIN) 137 mcg (0.1 %) nasal spray 1 [place] by Both Nostrils route two (2) times a day. Use in each nostril as directed 1 Bottle 2    cyanocobalamin (VITAMIN B12) 500 mcg tablet Take 1 Tab by mouth daily. gummie 90 Tab 1    multivit with min-folic acid (WOMEN'S MULTIVITAMIN GUMMIES) 200 mcg chew Take  by mouth.  Lactobacillus acidophilus (PROBIOTIC PO) Take  by mouth.  albuterol (PROVENTIL VENTOLIN) 2.5 mg /3 mL (0.083 %) nebulizer solution 3 mL by Nebulization route every four (4) hours as needed for Wheezing. 1 each 3    fexofenadine (ALLEGRA) 180 mg tablet Take 1 Tab by mouth daily. 60 Tab 0    Nebulizer & Compressor machine by Does Not Apply route. q4 hours with medication 1 Each 0     No current facility-administered medications on file prior to visit.

## 2021-05-10 NOTE — PROGRESS NOTES
1. Have you been to the ER, urgent care clinic since your last visit? Hospitalized since your last visit? No    2. Have you seen or consulted any other health care providers outside of the [address] [place] [address] since your last visit? Include any pap smears or colon screening. No    Reviewed record in preparation for visit and have necessary documentation  Goals that were addressed and/or need to be completed during or after this appointment include     Health Maintenance Due   Topic Date Due    COVID-19 Vaccine (1) Never done    DTaP/Tdap/Td series (1 - Tdap) Never done    Shingrix Vaccine Age 50> (1 of 2) Never done    Breast Cancer Screen Mammogram  04/16/2019    Foot Exam Q1  05/16/2020    Pneumococcal 65+ years (2 of 2 - PPSV23) 06/25/2020       Patient is accompanied by self I have received verbal consent from Lena Horan to discuss any/all medical information while they are present in the room.

## 2021-05-10 NOTE — PATIENT INSTRUCTIONS
Learning About Low Blood Sugar (Hypoglycemia) in Diabetes What is low blood sugar (hypoglycemia)? Hypoglycemia means that your blood sugar is low and your body (especially your brain) is not getting enough fuel. If you have diabetes, your blood sugar can go too low if you take too much of some diabetes medicines. It can also go too low if you miss a meal. And it can happen if you exercise too hard without eating enough food. Some medicines used to treat other health problems can cause low blood sugar too. What are the symptoms? Symptoms of low blood sugar can start quickly. It may take just 10 to 15 minutes. If you have had diabetes for many years, you may not realize that your blood sugar is low until it drops very low. · If your blood sugar level drops below 70 (mild low blood sugar), you may feel tired, anxious, dizzy, weak, shaky, or sweaty. You may have a fast heartbeat or blurry vision. · If your blood sugar level continues to drop (usually below 40), your behavior may change. You may feel more irritable. You may find it hard to concentrate or talk. And you may feel unsteady when you stand or walk. You may become too weak or confused to eat something with sugar to raise your blood sugar level. · If your blood sugar level drops very low (usually below 20), you may pass out (lose consciousness). Or you may have a seizure or stroke. If you have symptoms of severe low blood sugar, you need to get medical care right away. If you had a low blood sugar level during the night, you may wake up tired or with a headache. Or you may sweat so much during the night that your pajamas or sheets are damp when you wake up. How is low blood sugar treated? You can treat low blood sugar by eating or drinking something that has 15 grams of carbohydrate. These should be quick-sugar foods.  Check your blood sugar level again 15 minutes after having a quick-sugar food to make sure your level is getting back to your target range. 
Children usually need less than 15 grams of carbohydrate. Check with your doctor or diabetes educator for the amount that is right for your child. Here are examples of quick-sugar foods that have 15 grams of carbohydrate: · 3 to 4 glucose tablets · 1 tablespoon (3 teaspoons) of table sugar · 1 tablespoon (3 teaspoons) honey · ½ cup to ¾ cup (4 to 6 ounces) of fruit juice or regular (not diet) soda · Hard candy (such as 6 Life Savers) If you have problems with severe low blood sugar, someone else may have to give you a shot of glucagon. This is a hormone that raises blood sugar levels quickly. How can you prevent low blood sugar? You can take steps to prevent low blood sugar. · Follow your treatment plan. Take your insulin or other diabetes medicine exactly as your doctor prescribed it. Talk with your doctor if you're having low blood sugar often. Your medicine may need to be adjusted if it's causing your low blood sugar. · Check your blood sugar levels often. This helps you find early changes before an emergency happens. · Keep a quick-sugar food with you in case your blood sugar level drops low. · Eat small meals more often so that you don't get too hungry between meals. Don't skip meals. · Balance extra exercise with eating more. Check your blood sugar and learn how it changes after exercise. If your blood sugar stays at a normal level, you may not need to eat after you exercise. · Limit how much alcohol you drink. Alcohol can make low blood sugar go even lower. Don't drink alcohol if you have problems recognizing the early signs of low blood sugar. · Keep a diary of your symptoms. This helps you learn when changes in your body may signal low blood sugar. And keep track of how often you have low blood sugar, including when you last ate and what you ate. This will help you learn what causes your blood sugar to drop. · Learn about diabetes and low blood sugar.  Support groups or a diabetes education center can help you understand how medicines, diet, and exercise affect your blood sugar levels. Since low blood sugar levels can quickly become an emergency, be sure to wear medical alert jewelry, such as a medical alert bracelet. This is to let people know you have diabetes so they can get help for you. You can buy this at most drugstores. And make sure your family, friends, and coworkers know the symptoms of low blood sugar. Teach them what to do to get your sugar level up. Follow-up care is a key part of your treatment and safety. Be sure to make and go to all appointments, and call your doctor if you are having problems. It's also a good idea to know your test results and keep a list of the medicines you take. Where can you learn more? Go to http://www.gray.com/ Enter I857 in the search box to learn more about \"Learning About Low Blood Sugar (Hypoglycemia) in Diabetes. \" Current as of: August 31, 2020               Content Version: 12.8 © 2006-2021 [employer]. Care instructions adapted under license by [employer] (which disclaims liability or warranty for this information). If you have questions about a medical condition or this instruction, always ask your healthcare professional. [address] any warranty or liability for your use of this information.

## 2021-05-11 ENCOUNTER — TELEPHONE (OUTPATIENT)
Dept: FAMILY MEDICINE CLINIC | Age: 66
End: 2021-05-11

## 2021-05-11 NOTE — TELEPHONE ENCOUNTER
Called to advise pt of recent results. Labs look wonderful. A1c is even better than before. Given this and that she is having some hypoglycemic episodes, would like her to stop Amaryl. Will recheck labs in 6 months and go from there. All questions answered and pt feels comfortable with the plan of care.

## 2021-06-15 ENCOUNTER — TELEPHONE (OUTPATIENT)
Dept: FAMILY MEDICINE CLINIC | Age: 66
End: 2021-06-15

## 2021-06-15 NOTE — TELEPHONE ENCOUNTER
Pt reports having hot flashes, no fever, HA, feeling \"off\"  Advised pt to be evaluated at urgent care  Pt verbalized understanding

## 2021-06-15 NOTE — TELEPHONE ENCOUNTER
----- Message from Renee Marti sent at 6/15/2021  8:16 AM EDT -----  Regarding: Dr. Balderas Junes: [phone]  Level 1/Escalated Issue      Caller's first and last name and relationship (if not the patient): Pt. Best contact number(s): [phone]      What are the symptoms: Hot and cold flashes, blurred vision, headache, restless. Transfer successful - yes/no (include outcome): No, no answer. Transfer declined - yes/no (include reason): N/a. Was caller advised to seek appropriate level of care - yes/no: Yes. Details to clarify the request: No appointment available today.          Renee Marti

## 2021-06-21 ENCOUNTER — VIRTUAL VISIT (OUTPATIENT)
Dept: FAMILY MEDICINE CLINIC | Age: 66
End: 2021-06-21
Payer: MEDICARE

## 2021-06-21 DIAGNOSIS — R39.15 URINARY URGENCY: Primary | ICD-10-CM

## 2021-06-21 DIAGNOSIS — E55.9 VITAMIN D DEFICIENCY, UNSPECIFIED: ICD-10-CM

## 2021-06-21 DIAGNOSIS — R68.89 OTHER GENERAL SYMPTOMS AND SIGNS: ICD-10-CM

## 2021-06-21 DIAGNOSIS — R53.83 FATIGUE, UNSPECIFIED TYPE: ICD-10-CM

## 2021-06-21 PROCEDURE — G9899 SCRN MAM PERF RSLTS DOC: HCPCS | Performed by: FAMILY MEDICINE

## 2021-06-21 PROCEDURE — G8399 PT W/DXA RESULTS DOCUMENT: HCPCS | Performed by: FAMILY MEDICINE

## 2021-06-21 PROCEDURE — 1101F PT FALLS ASSESS-DOCD LE1/YR: CPT | Performed by: FAMILY MEDICINE

## 2021-06-21 PROCEDURE — G8510 SCR DEP NEG, NO PLAN REQD: HCPCS | Performed by: FAMILY MEDICINE

## 2021-06-21 PROCEDURE — 1090F PRES/ABSN URINE INCON ASSESS: CPT | Performed by: FAMILY MEDICINE

## 2021-06-21 PROCEDURE — G8427 DOCREV CUR MEDS BY ELIG CLIN: HCPCS | Performed by: FAMILY MEDICINE

## 2021-06-21 PROCEDURE — 99213 OFFICE O/P EST LOW 20 MIN: CPT | Performed by: FAMILY MEDICINE

## 2021-06-21 PROCEDURE — G8756 NO BP MEASURE DOC: HCPCS | Performed by: FAMILY MEDICINE

## 2021-06-21 PROCEDURE — 3017F COLORECTAL CA SCREEN DOC REV: CPT | Performed by: FAMILY MEDICINE

## 2021-06-21 NOTE — PROGRESS NOTES
Lena Washington is a 78 y.o. female evaluated via Doximetry on 21. Patient Identity confirmed by . Consent:  He and/or health care decision maker is aware that that he may receive a bill for this telephone service, depending on his insurance coverage, and has provided verbal consent to proceed: Yes    Physician Location: Office  Patient Location: Home  Nurse Assisting with Encounter: Ricardo Bauer LPN    Chief Complaint   Patient presents with    Headache    Fatigue      Information gathered from patient and/or health care decision maker. HPI:   Reports that for the past 5-7 days has been feeling Sluggish, body aches, headaches. Notes BG and BP have been well controlled. Noting a recent urgency and feeling of incomplete voiding with urination, though denies frequency and dysuria. Notes 14 y.o. was not feeling well 2 days ago though improved with vomiting. Denies fevers, chills, nausea, vomiting, nasal symptoms, coughing. Patient notes she only sleeps 4-5 hours at a time. Issues with sleep hygiene. Review of Systems   Constitutional: Positive for malaise/fatigue. Negative for chills and fever. HENT: Negative for congestion. Cardiovascular: Negative for chest pain and palpitations. Gastrointestinal: Negative for heartburn, nausea and vomiting. Neurological: Negative for headaches. Limited Exam:  Due to this being a TeleHealth evaluation, many elements of the physical examination are unable to be assessed.       Constitutional: Appears well-developed and well-nourished in no apparent distress   Mental status: Alert and awake, Oriented to person/place/time, Able to follow commands  Eyes: EOM normal, Sclera normal, No visible ocular discharge  HENT: Normocephalic, atraumatic; Mouth/Throat: Moist mucous membranes, External Ears normal  Neck: No visualized mass  Pulmonary/Chest: Respiratory effort normal, No visualized signs of difficulty breathing or respiratory distress Musculoskeletal: Normal gait with no signs of ataxia, Normal range of motion of neck  Neurological: No facial asymmetry (Cranial nerve 7 motor function), No gaze palsy  Skin: No significant exanthematous lesions or discoloration noted on facial skin  Psychiatric: Normal affect, normal judgment/insight. No hallucinations     Current Outpatient Medications on File Prior to Visit   Medication Sig Dispense Refill    cholecalciferol (Vitamin D3) (1000 Units /25 mcg) tablet Take 1,000 Units by mouth daily.  loratadine (Claritin) 10 mg tablet Take 10 mg by mouth daily.  lisinopril-hydroCHLOROthiazide (PRINZIDE, ZESTORETIC) 20-25 mg per tablet Take 1 Tab by mouth daily. 90 Tab 0    naproxen (NAPROSYN) 250 mg tablet Take 1 Tab by mouth two (2) times daily as needed for Pain. Take with meals. 30 Tab 0    albuterol (ProAir HFA) 90 mcg/actuation inhaler Take 1 puff every 4 hours as needed for shortness of breath or persistent nighttime cough. 1 Inhaler 6    azelastine (ASTELIN) 137 mcg (0.1 %) nasal spray 1 [place] by Both Nostrils route two (2) times a day. Use in each nostril as directed 1 Bottle 2    cyanocobalamin (VITAMIN B12) 500 mcg tablet Take 1 Tab by mouth daily. gummie 90 Tab 1    multivit with min-folic acid (WOMEN'S MULTIVITAMIN GUMMIES) 200 mcg chew Take  by mouth.  Lactobacillus acidophilus (PROBIOTIC PO) Take  by mouth as needed.  albuterol (PROVENTIL VENTOLIN) 2.5 mg /3 mL (0.083 %) nebulizer solution 3 mL by Nebulization route every four (4) hours as needed for Wheezing. 1 each 3    Nebulizer & Compressor machine by Does Not Apply route. q4 hours with medication 1 Each 0     No current facility-administered medications on file prior to visit. Allergies   Allergen Reactions    Oxycodone-Acetaminophen Other (comments)     felt like \"out of body experience\"  Other reaction(s):  Other (comments)  felt like \"out of body experience\"      Percodan [Oxycodone Hcl-Oxycodone-Asa] Other (comments)     felt like \"out of body experience\"        Patient Active Problem List    Diagnosis Date Noted    [facility] 11/26/2019    Knee pain 07/03/2019    Low back pain 07/03/2019    Controlled type 2 diabetes mellitus with complication, without long-term current use of insulin ([facility] 01/10/2019    Abnormal CPK 04/17/2012    Asthma 05/11/2010    HTN (hypertension) 05/11/2010    AR (allergic rhinitis) 05/11/2010    Alopecia, unspecified 05/11/2010    Mammogram abnormal 05/11/2010    Microscopic hematuria 06/11/2002        Health Maintenance Due   Topic Date Due    DTaP/Tdap/Td series (1 - Tdap) Never done    Shingrix Vaccine Age 50> (1 of 2) Never done    Pneumococcal 65+ years (2 of 2 - PPSV23) 06/25/2020    Eye Exam Retinal or Dilated  06/25/2021        Assessment/Plan:  Details of this discussion including any medical advice provided: Evaluate for UTI possible vitamin defciency as well or THyroid. If negative evaluate for sleep apnea. ICD-10-CM ICD-9-CM    1. Urinary urgency  R39.15 788.63 CULTURE, URINE      URINALYSIS W/ RFLX MICROSCOPIC   2. Fatigue, unspecified type  R53.83 780.79 TSH 3RD GENERATION      VITAMIN D, 25 HYDROXY      VITAMIN B12 & FOLATE   3. Vitamin D deficiency, unspecified   E55.9 268.9 VITAMIN D, 25 HYDROXY   4. Other general symptoms and signs   R68.89 780.99 VITAMIN B12 & FOLATE     Follow-up and Dispositions    · Return for TBD. Total Time: minutes: 11-20 minutes was spent on telemedicine encounter discussing above problems and plans. Patient Problem list, medications, and Allergies were reviewed during this encounter.     Pursuant to the emergency declaration under the [facility], [facility] authority and the [employer] Act, this Telephone Visit was conducted, with patient's consent, to reduce the patient's risk of exposure to COVID-19 and provide continuity of care for an established patient. I affirm this is a Patient Initiated Episode with an Established Patient who has not had a related appointment within my department in the past 7 days or scheduled within the next 24 hours. Discussed diagnoses in detail with patient. Medication risks/benefits/side effects discussed with patient. All of the patient's questions were addressed. The patient understands and agrees with our plan of care. The patient knows to call back if they are unsure of or forget any changes we discussed today or if the symptoms change.     Note: not billable if this call serves to triage the patient into an appointment for the relevant concern    MD MELANIE Sal & DAVIDE BRIONES [facility]  06/21/21

## 2021-06-21 NOTE — PROGRESS NOTES
Chief Complaint   Patient presents with    Headache    Fatigue     1. Have you been to the ER, urgent care clinic since your last visit? Hospitalized since your last visit? No    2. Have you seen or consulted any other health care providers outside of the [address] [place] [address] since your last visit? Include any pap smears or colon screening.  No    Reviewed record in preparation for visit and have necessary documentation  Pt did not bring medication to office visit for review  opportunity was given for questions  Goals that were addressed and/or need to be completed during or after this appointment include   Health Maintenance Due   Topic Date Due    DTaP/Tdap/Td series (1 - Tdap) Never done    Shingrix Vaccine Age 50> (1 of 2) Never done    Pneumococcal 65+ years (2 of 2 - PPSV23) 06/25/2020    Eye Exam Retinal or Dilated  06/25/2021

## 2021-06-23 LAB
25(OH)D3 SERPL-MCNC: 28 NG/ML (ref 30–100)
APPEARANCE UR: ABNORMAL
BACTERIA SPEC CULT: NORMAL
BACTERIA URNS QL MICRO: NEGATIVE /HPF
BILIRUB UR QL: NEGATIVE
COLOR UR: ABNORMAL
EPITH CASTS URNS QL MICRO: ABNORMAL /LPF
FOLATE SERPL-MCNC: 8.3 NG/ML (ref 5–21)
GLUCOSE UR STRIP.AUTO-MCNC: NEGATIVE MG/DL
HGB UR QL STRIP: NEGATIVE
HYALINE CASTS URNS QL MICRO: ABNORMAL /LPF (ref 0–5)
KETONES UR QL STRIP.AUTO: NEGATIVE MG/DL
LEUKOCYTE ESTERASE UR QL STRIP.AUTO: ABNORMAL
NITRITE UR QL STRIP.AUTO: NEGATIVE
PH UR STRIP: 5 [PH] (ref 5–8)
PROT UR STRIP-MCNC: NEGATIVE MG/DL
RBC #/AREA URNS HPF: ABNORMAL /HPF (ref 0–5)
SERVICE CMNT-IMP: NORMAL
SP GR UR REFRACTOMETRY: 1.03 (ref 1–1.03)
TSH SERPL DL<=0.05 MIU/L-ACNC: 1.33 UIU/ML (ref 0.36–3.74)
UROBILINOGEN UR QL STRIP.AUTO: 0.2 EU/DL (ref 0.2–1)
VIT B12 SERPL-MCNC: 461 PG/ML (ref 193–986)
WBC URNS QL MICRO: ABNORMAL /HPF (ref 0–4)

## 2021-07-19 NOTE — PROGRESS NOTES
Patient reports having pain in the calf, is having difficulty with raising the leg today. She is feeling more tired.    Notified MD office of patient having pain in the right calf. Patient was sent to [facility] for doppler which did reveal a DVT. Subjective CC: April D Joseph Gale is an 71 y.o. female presents for medication refill HTN: 
Has been taking medication as prescribed. BP at home runs 120-130s/80s. She denies CP, SOB, or swelling in legs. DM: 
A1c in 1/2019 was 7.0. She has been taking glimepiride daily. Metformin gave her terrible GI side effects. Her fasting glucose is 80-120s. She has never had symptoms of low blood sugar. She sees eye doctor in [place] with plans to see him in upcoming months. She denies N/V. OA and DDD: 
Patient needs refill on meloxicam. She states the medication is working. She takes it daily but took a brief break from it but has recently restarted. Denies leg weakness or pain radiating down the back of the leg. Allergies - reviewed: Allergies Allergen Reactions  Percocet [Oxycodone-Acetaminophen] Other (comments)  
  felt like \"out of body experience\"  Percodan [Oxycodone Hcl-Oxycodone-Asa] Other (comments)  
  felt like \"out of body experience\" Medications - reviewed:  
Current Outpatient Medications Medication Sig  
 glimepiride (AMARYL) 1 mg tablet Take 1 Tab by mouth Daily (before breakfast).  lisinopril-hydroCHLOROthiazide (PRINZIDE, ZESTORETIC) 20-25 mg per tablet Take 1 Tab by mouth daily.  multivit with min-folic acid (WOMEN'S MULTIVITAMIN GUMMIES) 200 mcg chew Take  by mouth.  Lactobacillus acidophilus (PROBIOTIC PO) Take  by mouth.  nystatin (MYCOSTATIN) 100,000 unit/gram ointment Apply  to affected area two (2) times a day.  gabapentin (NEURONTIN) 100 mg capsule Take 100 mg by mouth daily.  traMADol (ULTRAM) 50 mg tablet Take 50 mg by mouth every six (6) hours as needed for Pain.  meloxicam (MOBIC) 15 mg tablet Take 15 mg by mouth daily.  gabapentin (NEURONTIN) 300 mg capsule Take 300 mg by mouth nightly.  albuterol (PROAIR HFA) 90 mcg/actuation inhaler Take 1 Puff by inhalation every four (4) hours as needed.  loratadine (CLARITIN) 10 mg tablet Take 1 tablet by mouth daily.  albuterol (PROVENTIL VENTOLIN) 2.5 mg /3 mL (0.083 %) nebulizer solution 3 mL by Nebulization route every four (4) hours as needed for Wheezing.  Nebulizer & Compressor machine by Does Not Apply route. q4 hours with medication No current facility-administered medications for this visit. Past Medical History - reviewed: 
Past Medical History:  
Diagnosis Date  Asthma  Breast cancer ([facility]  Hx of seasonal allergies  Hypertension Immunizations - reviewed:  
Immunization History Administered Date(s) Administered  Influenza Vaccine (Quad) PF 12/05/2014, 12/01/2015  Influenza Vaccine Split 01/24/2011, 10/31/2011, 10/26/2012 ROS Review of Systems : A review of systems was performed. All pertinent negatives and positives are mentioned in the HPI. Physical Exam 
Visit Vitals /86 (BP 1 Location: Left arm, BP Patient Position: Sitting) Pulse 78 Temp 98.6 °F (37 °C) (Oral) Resp 20 Ht 5' 4\" (1.626 m) Wt 203 lb 3.2 oz (92.2 kg) LMP 01/01/1975 SpO2 97% BMI 34.88 kg/m² General appearance - Alert, NAD. Head: Atraumatic. Normocephalic. No lymphadenopathy Eyes: EOMI. Sclera white. Respiratory - LCTAB. No wheeze/rale/rhonchi Heart - Normal rate, regular rhythm. No m/r/r Abdomen - Soft, non tender. Non distended. Neurological - No focal deficits. Speech normal.  
Musculoskeletal - Gait normal.   
Extremities - No LE edema. Distal pulses intact Skin - normal coloration and normal turgor. No cyanosis, no rash. Assessment/Plan 1. Essential hypertension - BP at goal. 
- lisinopril-hydroCHLOROthiazide (PRINZIDE, ZESTORETIC) 20-25 mg per tablet; Take 1 Tab by mouth daily. Dispense: 90 Tab; Refill: 1 2. Controlled type 2 diabetes mellitus with complication, without long-term current use of insulin ([facility] - glimepiride (AMARYL) 1 mg tablet; Take 0.5 Tabs by mouth Daily (before breakfast). Dispense: 30 Tab; Refill: 0 
-  DIABETES FOOT EXAM 
- HEMOGLOBIN A1C WITH EAG 
- MICROALBUMIN, UR, RAND W/ MICROALB/CREAT RATIO 
 
3. Osteoarthritis of both knees, unspecified osteoarthritis type - will stop meloxicam and transition to naproxen. - naproxen (NAPROSYN) 250 mg tablet; Take 1 Tab by mouth two (2) times daily as needed for Pain. Take with meals. Dispense: 60 Tab; Refill: 1 I have discussed the aforementioned diagnoses and plan with the patient in detail. I have provided information in person and/or in AVS. All questions answered prior to discharge. Kishan Aguilar MD 
Family Medicine Resident

## 2021-08-17 DIAGNOSIS — I10 ESSENTIAL HYPERTENSION: ICD-10-CM

## 2021-08-17 RX ORDER — LISINOPRIL AND HYDROCHLOROTHIAZIDE 20; 25 MG/1; MG/1
TABLET ORAL
Qty: 90 TABLET | Refills: 0 | Status: SHIPPED | OUTPATIENT
Start: 2021-08-17 | End: 2021-11-29

## 2021-10-28 ENCOUNTER — OFFICE VISIT (OUTPATIENT)
Dept: FAMILY MEDICINE CLINIC | Age: 66
End: 2021-10-28
Payer: MEDICARE

## 2021-10-28 VITALS
SYSTOLIC BLOOD PRESSURE: 142 MMHG | HEIGHT: 64 IN | DIASTOLIC BLOOD PRESSURE: 84 MMHG | BODY MASS INDEX: 33.29 KG/M2 | HEART RATE: 89 BPM | TEMPERATURE: 99 F | RESPIRATION RATE: 20 BRPM | OXYGEN SATURATION: 97 % | WEIGHT: 195 LBS

## 2021-10-28 DIAGNOSIS — Z23 ENCOUNTER FOR IMMUNIZATION: ICD-10-CM

## 2021-10-28 DIAGNOSIS — J01.00 ACUTE NON-RECURRENT MAXILLARY SINUSITIS: Primary | ICD-10-CM

## 2021-10-28 DIAGNOSIS — R05.9 COUGH: ICD-10-CM

## 2021-10-28 DIAGNOSIS — J45.20 MILD INTERMITTENT ASTHMA WITHOUT COMPLICATION: ICD-10-CM

## 2021-10-28 DIAGNOSIS — I10 PRIMARY HYPERTENSION: ICD-10-CM

## 2021-10-28 PROCEDURE — G8399 PT W/DXA RESULTS DOCUMENT: HCPCS | Performed by: FAMILY MEDICINE

## 2021-10-28 PROCEDURE — G8417 CALC BMI ABV UP PARAM F/U: HCPCS | Performed by: FAMILY MEDICINE

## 2021-10-28 PROCEDURE — G8427 DOCREV CUR MEDS BY ELIG CLIN: HCPCS | Performed by: FAMILY MEDICINE

## 2021-10-28 PROCEDURE — G8754 DIAS BP LESS 90: HCPCS | Performed by: FAMILY MEDICINE

## 2021-10-28 PROCEDURE — 99214 OFFICE O/P EST MOD 30 MIN: CPT | Performed by: FAMILY MEDICINE

## 2021-10-28 PROCEDURE — G8753 SYS BP > OR = 140: HCPCS | Performed by: FAMILY MEDICINE

## 2021-10-28 PROCEDURE — G9899 SCRN MAM PERF RSLTS DOC: HCPCS | Performed by: FAMILY MEDICINE

## 2021-10-28 PROCEDURE — 1090F PRES/ABSN URINE INCON ASSESS: CPT | Performed by: FAMILY MEDICINE

## 2021-10-28 PROCEDURE — G8536 NO DOC ELDER MAL SCRN: HCPCS | Performed by: FAMILY MEDICINE

## 2021-10-28 PROCEDURE — 3017F COLORECTAL CA SCREEN DOC REV: CPT | Performed by: FAMILY MEDICINE

## 2021-10-28 PROCEDURE — 1101F PT FALLS ASSESS-DOCD LE1/YR: CPT | Performed by: FAMILY MEDICINE

## 2021-10-28 PROCEDURE — G8510 SCR DEP NEG, NO PLAN REQD: HCPCS | Performed by: FAMILY MEDICINE

## 2021-10-28 RX ORDER — FLUTICASONE PROPIONATE 50 MCG
2 SPRAY, SUSPENSION (ML) NASAL
COMMUNITY

## 2021-10-28 RX ORDER — ZOSTER VACCINE RECOMBINANT, ADJUVANTED 50 MCG/0.5
0.5 KIT INTRAMUSCULAR ONCE
Qty: 0.5 ML | Refills: 0 | Status: SHIPPED | OUTPATIENT
Start: 2021-10-28 | End: 2021-10-28

## 2021-10-28 RX ORDER — DOXYCYCLINE 100 MG/1
100 TABLET ORAL 2 TIMES DAILY
Qty: 20 TABLET | Refills: 0 | Status: SHIPPED | OUTPATIENT
Start: 2021-10-28 | End: 2021-11-07

## 2021-10-28 RX ORDER — ALBUTEROL SULFATE 0.83 MG/ML
2.5 SOLUTION RESPIRATORY (INHALATION)
Qty: 1 EACH | Refills: 3 | Status: SHIPPED | OUTPATIENT
Start: 2021-10-28

## 2021-10-28 NOTE — PROGRESS NOTES
1. Have you been to the ER, urgent care clinic since your last visit? Hospitalized since your last visit? No    2. Have you seen or consulted any other health care providers outside of the [address] [place] [address] since your last visit? Include any pap smears or colon screening.  No  Reviewed record in preparation for visit and have necessary documentation  Pt did not bring medication to office visit for review    Goals that were addressed and/or need to be completed during or after this appointment include   Health Maintenance Due   Topic Date Due    DTaP/Tdap/Td series (1 - Tdap) Never done    Shingrix Vaccine Age 50> (1 of 2) Never done    Pneumococcal 65+ years (2 of 2 - PPSV23) 06/25/2020    Eye Exam Retinal or Dilated  06/25/2021    Flu Vaccine (1) 09/01/2021    Breast Cancer Screen Mammogram  09/18/2021    Medicare Yearly Exam  11/10/2021    A1C test (Diabetic or Prediabetic)  11/10/2021

## 2021-10-28 NOTE — PROGRESS NOTES
CC: Cough and HA    HPI: Pt is a 78 y.o. female who presents for cough and HA. Symptoms present for the past 2 weeks and gradually worsening. Pain is in her temples b/l and also feels like pressure in her sinuses and pain in her teeth. No fevers. Cough associated with SOB sometimes which she attributes to mucus. She has tried some albuterol which seems to help. She has not used any albuterol today. Of note, she has had both Moderna vaccines but no booster yet. 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 Pt was tested for COVID at that time and it was negative. She did not have any symptoms at the time. She has not been watching any children since that time. Past Medical History:   Diagnosis Date    Asthma     [facility] ([facility]     Diabetes ([facility]     Hx of seasonal allergies     Hypertension        Family History   Problem Relation Age of Onset    Hypertension Mother     Hypertension Father     Dementia Father     Stroke Father     Diabetes Sister        Social History     Tobacco Use    Smoking status: Former Smoker     Quit date: 1974     Years since quittin.3    Smokeless tobacco: Never Used   Substance Use Topics    Alcohol use: Yes     Comment: infrequent    Drug use: No       ROS:  Per HPI    PE:  Visit Vitals  BP (!) 142/84   Pulse 89   Temp 99 °F (37.2 °C)   Resp 20   Ht 5' 4\" (1.626 m)   Wt 195 lb (88.5 kg)   LMP 1975   SpO2 97%   BMI 33.47 kg/m²     Gen: Pt sitting in chair, in NAD  Head: Normocephalic, atraumatic  Eyes: Sclera anicteric, EOM grossly intact, PERRL  Ears: TM's dull b/l, non-erythematous  Nose: Boggy, erythematous nasal mucosa  Throat: MMM, normal lips, tongue and gums. No pharyngeal erythema or exudate. Neck: Supple, no LAD  CVS: Normal S1, S2, no m/r/g  Resp: CTAB, no wheezes or rales. Good air movement throughout.    Extrem: Atraumatic, no cyanosis or edema  Pulses: 2+   Skin: Warm, dry  Neuro: Alert, oriented, appropriate      A/P:   Encounter Diagnoses     ICD-10-CM ICD-9-CM   1. Acute non-recurrent maxillary sinusitis  J01.00 461.0   2. Mild intermittent asthma without complication  V95.44 848.35   3. Encounter for immunization  Z23 V03.89   4. Cough  R05.9 786.2   5. Primary hypertension  I10 401.9     1. Mild intermittent asthma without complication: Do not believe current symptoms are an exacerbation of asthma necessitating steroids, as she is clear today and has not had any albuterol. Advised her to continue using albuterol q4h prn for symptoms and to let us know if she is needing it more frequently. Otherwise will test for COVID and treat for sinusitis, as below. - albuterol (PROVENTIL VENTOLIN) 2.5 mg /3 mL (0.083 %) nebu; 3 mL by Nebulization route every four (4) hours as needed for Wheezing. Dispense: 1 Each; Refill: 3    2. Acute non-recurrent maxillary sinusitis: Given concurrent chest congestion and pt's request not to have a large pill, will do doxycycline as opposed to Augmentin.   - doxycycline (ADOXA) 100 mg tablet; Take 1 Tablet by mouth two (2) times a day for 10 days. Dispense: 20 Tablet; Refill: 0    3. Encounter for immunization: Rx sent to her pharmacy per her request, for when she is feeling better. - varicella-zoster recombinant, PF, (Shingrix, PF,) 50 mcg/0.5 mL susr injection; 0.5 mL by IntraMUSCular route once for 1 dose. Dispense: 0.5 mL; Refill: 0    4. Cough: Pt advised test should take about 2 days to come back and to quarantine until that time. Pt states it is ok to leave test results on voicemail if she does not answer.   - NOVEL CORONAVIRUS (COVID-19)    5. HTN: BP slightly elevated today, but could be related to acute illness. Pt coming back next month for f/u chronic conditions and labs, will recheck then and she can call sooner if it remains elevated at home.         RTC prn if symptoms worsen or fail to improve, otherwise in 1 month for MWV and f/u chronic conditions    Discussed diagnoses in detail with patient. Medication risks/benefits/side effects discussed with patient. All of the patient's questions were addressed. The patient understands and agrees with our plan of care. The patient knows to call back if they are unsure of or forget any changes we discussed today or if the symptoms change. The patient received an After-Visit Summary which contains VS, orders, medication list and allergy list. This can be used as a \"mini-medical record\" should they have to seek medical care while out of town. Current Outpatient Medications on File Prior to Visit   Medication Sig Dispense Refill    lisinopril-hydroCHLOROthiazide (PRINZIDE, ZESTORETIC) 20-25 mg per tablet Take 1 tablet by mouth once daily 90 Tablet 0    cholecalciferol (Vitamin D3) (1000 Units /25 mcg) tablet Take 1,000 Units by mouth daily.  loratadine (Claritin) 10 mg tablet Take 10 mg by mouth daily.  naproxen (NAPROSYN) 250 mg tablet Take 1 Tab by mouth two (2) times daily as needed for Pain. Take with meals. 30 Tab 0    albuterol (ProAir HFA) 90 mcg/actuation inhaler Take 1 puff every 4 hours as needed for shortness of breath or persistent nighttime cough. 1 Inhaler 6    azelastine (ASTELIN) 137 mcg (0.1 %) nasal spray 1 [place] by Both Nostrils route two (2) times a day. Use in each nostril as directed 1 Bottle 2    cyanocobalamin (VITAMIN B12) 500 mcg tablet Take 1 Tab by mouth daily. gummie 90 Tab 1    multivit with min-folic acid (WOMEN'S MULTIVITAMIN GUMMIES) 200 mcg chew Take  by mouth.  Lactobacillus acidophilus (PROBIOTIC PO) Take  by mouth as needed.  Nebulizer & Compressor machine by Does Not Apply route. q4 hours with medication 1 Each 0    fluticasone propionate (Flonase Allergy Relief) 50 mcg/actuation nasal spray 1 spray in each nostril       No current facility-administered medications on file prior to visit.

## 2021-10-28 NOTE — PATIENT INSTRUCTIONS
Asthma in Adults: Care Instructions  Overview     Asthma makes it hard for you to breathe. During an asthma attack, the airways swell and narrow. Severe asthma attacks can be dangerous, but you can usually prevent them. Controlling asthma and treating symptoms before they get bad can help you avoid bad attacks. You may also avoid future trips to the doctor. Follow-up care is a key part of your treatment and safety. Be sure to make and go to all appointments, and call your doctor if you are having problems. It's also a good idea to know your test results and keep a list of the medicines you take. How can you care for yourself at home? · Follow your asthma action plan so you can manage your symptoms at home. An asthma action plan will help you prevent and control airway reactions and will tell you what to do during an asthma attack. If you do not have an asthma action plan, work with your doctor to build one. · Take your asthma medicine exactly as prescribed. Medicine plays an important role in controlling asthma. Talk to your doctor right away if you have any questions about what to take and how to take it. ? Use your quick-relief medicine when you have symptoms of an asthma attack. Some people need to use quick-relief medicine before they exercise to prevent asthma symptoms. Albuterol is a quick-relief medicine that is often used. In some cases, a certain type of controller inhaler is used as a quick-relief medicine. Ask your doctor what to use for quick relief. ? Take your controller medicine. If you have symptoms often, you will likely need to take it every day. Controller medicine usually includes an inhaled corticosteroid. The goal is to prevent problems before they occur. ? If your doctor prescribed corticosteroid pills to use during an attack, take them as directed. They may take hours to work, but they may shorten the attack and help you breathe better. ?  Keep your quick-relief medicine with you at all times. · Talk to your doctor before using other medicines. Some medicines, such as aspirin, can cause asthma attacks in some people. · Check yourself for asthma symptoms to know which step to follow in your action plan. Watch for things like being short of breath, having chest tightness, coughing, and wheezing. Also notice if symptoms wake you up at night or if you get tired quickly when you exercise. · If you have a peak flow meter, use it to check how well you are breathing. This can help you predict when an asthma attack is going to occur. Then you can take medicine to prevent the asthma attack or make it less severe. · See your doctor regularly. These visits will help you learn more about asthma and what you can do to control it. Your doctor will monitor your treatment to make sure the medicine is helping you. · Keep track of your asthma attacks and your treatment. After you have had an attack, write down what triggered it, what helped end it, and any concerns you have about your asthma action plan. Take your diary when you see your doctor. You can then review your asthma action plan and decide if it is working. · Do not smoke or allow others to smoke around you. Avoid smoky places. Smoking makes asthma worse. If you need help quitting, talk to your doctor about stop-smoking programs and medicines. These can increase your chances of quitting for good. · Learn what triggers an asthma attack for you, and avoid the triggers when you can. Common triggers include colds, smoke, air pollution, dust, pollen, mold, pets, cockroaches, stress, and cold air. · Avoid colds and the flu. Talk to your doctor about getting a pneumococcal vaccine shot. If you have had one before, ask your doctor whether you need a second dose. Get a flu vaccine every fall. If you must be around people with colds or the flu, wash your hands often. When should you call for help? Call 911 anytime you think you may need emergency care. For example, call if:    · You have severe trouble breathing. Call your doctor now or seek immediate medical care if:    · Your symptoms do not get better after you have followed your asthma action plan.     · You cough up yellow, dark brown, or bloody mucus (sputum). Watch closely for changes in your health, and be sure to contact your doctor if:    · Your coughing and wheezing get worse.     · You need to use quick-relief medicine on more than 2 days a week within a month (unless it is just for exercise).     · You need help figuring out what is triggering your asthma attacks. Where can you learn more? Go to http://www.gray.com/  Enter P597 in the search box to learn more about \"Asthma in Adults: Care Instructions. \"  Current as of: July 6, 2021               Content Version: 13.0  © 3041-5689 [employer]. Care instructions adapted under license by [email or web address] (which disclaims liability or warranty for this information). If you have questions about a medical condition or this instruction, always ask your healthcare professional. [address] any warranty or liability for your use of this information.

## 2021-10-30 ENCOUNTER — TELEPHONE (OUTPATIENT)
Dept: FAMILY MEDICINE CLINIC | Age: 66
End: 2021-10-30

## 2021-10-30 LAB
SARS-COV-2, NAA 2 DAY TAT: NORMAL
SARS-COV-2, NAA: NOT DETECTED

## 2021-10-30 NOTE — TELEPHONE ENCOUNTER
Called to advise pt of negative COVID test. Left message of negative results on her voicemail as pt had requested at her visit.

## 2021-11-29 ENCOUNTER — OFFICE VISIT (OUTPATIENT)
Dept: FAMILY MEDICINE CLINIC | Age: 66
End: 2021-11-29

## 2021-11-29 ENCOUNTER — TELEPHONE (OUTPATIENT)
Dept: PHARMACY | Age: 66
End: 2021-11-29

## 2021-11-29 VITALS
DIASTOLIC BLOOD PRESSURE: 81 MMHG | RESPIRATION RATE: 20 BRPM | OXYGEN SATURATION: 96 % | HEART RATE: 63 BPM | HEIGHT: 64 IN | WEIGHT: 199 LBS | SYSTOLIC BLOOD PRESSURE: 175 MMHG | TEMPERATURE: 98.7 F | BODY MASS INDEX: 33.97 KG/M2

## 2021-11-29 DIAGNOSIS — Z23 ENCOUNTER FOR IMMUNIZATION: ICD-10-CM

## 2021-11-29 DIAGNOSIS — E11.8 CONTROLLED TYPE 2 DIABETES MELLITUS WITH COMPLICATION, WITHOUT LONG-TERM CURRENT USE OF INSULIN (HCC): Primary | ICD-10-CM

## 2021-11-29 DIAGNOSIS — I10 PRIMARY HYPERTENSION: ICD-10-CM

## 2021-11-29 DIAGNOSIS — Z12.31 ENCOUNTER FOR SCREENING MAMMOGRAM FOR MALIGNANT NEOPLASM OF BREAST: ICD-10-CM

## 2021-11-29 DIAGNOSIS — E78.2 MIXED HYPERLIPIDEMIA: ICD-10-CM

## 2021-11-29 PROCEDURE — 99214 OFFICE O/P EST MOD 30 MIN: CPT | Performed by: FAMILY MEDICINE

## 2021-11-29 PROCEDURE — 2022F DILAT RTA XM EVC RTNOPTHY: CPT | Performed by: FAMILY MEDICINE

## 2021-11-29 PROCEDURE — 90694 VACC AIIV4 NO PRSRV 0.5ML IM: CPT | Performed by: FAMILY MEDICINE

## 2021-11-29 PROCEDURE — G8754 DIAS BP LESS 90: HCPCS | Performed by: FAMILY MEDICINE

## 2021-11-29 PROCEDURE — G8510 SCR DEP NEG, NO PLAN REQD: HCPCS | Performed by: FAMILY MEDICINE

## 2021-11-29 PROCEDURE — 1090F PRES/ABSN URINE INCON ASSESS: CPT | Performed by: FAMILY MEDICINE

## 2021-11-29 PROCEDURE — G8417 CALC BMI ABV UP PARAM F/U: HCPCS | Performed by: FAMILY MEDICINE

## 2021-11-29 PROCEDURE — G8427 DOCREV CUR MEDS BY ELIG CLIN: HCPCS | Performed by: FAMILY MEDICINE

## 2021-11-29 PROCEDURE — 1101F PT FALLS ASSESS-DOCD LE1/YR: CPT | Performed by: FAMILY MEDICINE

## 2021-11-29 PROCEDURE — G8399 PT W/DXA RESULTS DOCUMENT: HCPCS | Performed by: FAMILY MEDICINE

## 2021-11-29 PROCEDURE — 3044F HG A1C LEVEL LT 7.0%: CPT | Performed by: FAMILY MEDICINE

## 2021-11-29 PROCEDURE — G8753 SYS BP > OR = 140: HCPCS | Performed by: FAMILY MEDICINE

## 2021-11-29 PROCEDURE — G9899 SCRN MAM PERF RSLTS DOC: HCPCS | Performed by: FAMILY MEDICINE

## 2021-11-29 PROCEDURE — G0008 ADMIN INFLUENZA VIRUS VAC: HCPCS | Performed by: FAMILY MEDICINE

## 2021-11-29 PROCEDURE — G8536 NO DOC ELDER MAL SCRN: HCPCS | Performed by: FAMILY MEDICINE

## 2021-11-29 PROCEDURE — 3017F COLORECTAL CA SCREEN DOC REV: CPT | Performed by: FAMILY MEDICINE

## 2021-11-29 NOTE — PROGRESS NOTES
CC: f/u HTN, HLD and DM    HPI: Pt is a 78 y.o. female who presents for f/u HTN, HLD and DM.      HTN:  Checking BPs at home?: NO  Adding salt to foods?: NO but had a salty dinner last night  Lower extremity edema?: NO   Smoking?: NO    DM:  Checking BG at home?: NO  Insulin dependent?: NO  Other medications for DM?: None, diet-controlled  Symptoms of hypoglycemia?: NO  Aspirin?: NO  ACEi/ARB?: YES  Statin?: NO  Last eye exam?: 2019  Last foot exam?: 2021  Last A1c:   Lab Results   Component Value Date/Time    Hemoglobin A1c 6.5 (H) 05/10/2021 10:35 AM     LastLDL:   Lab Results   Component Value Date/Time    LDL, calculated 93.6 05/10/2021 10:35 AM     Last microalbumin:   Lab Results   Component Value Date/Time    Microalbumin/Creat ratio (mg/g creat) 7 05/10/2021 10:35 AM    Microalbumin,urine random 1.20 05/10/2021 10:35 AM           Past Medical History:   Diagnosis Date    Asthma     [email or web address]     Diabetes ([facility]     Hx of seasonal allergies     Hypertension        Family History   Problem Relation Age of Onset    Hypertension Mother     Hypertension Father     Dementia Father     Stroke Father     Diabetes Sister        Social History     Tobacco Use    Smoking status: Former Smoker     Quit date: 1974     Years since quittin.3    Smokeless tobacco: Never Used   Substance Use Topics    Alcohol use: Yes     Comment: infrequent    Drug use: No       ROS:  Per HPI    PE:  Visit Vitals  BP (!) 175/81   Pulse 63   Temp 98.7 °F (37.1 °C)   Resp 20   Ht 5' 4\" (1.626 m)   Wt 199 lb (90.3 kg)   LMP 1975   SpO2 96%   BMI 34.16 kg/m²     Gen: Pt sitting in chair, in NAD  Head: Normocephalic, atraumatic  Eyes: Sclera anicteric, EOM grossly intact, PERRL  Nose: Normal nasal mucosa  Throat: MMM, normal lips, tongue, teeth and gums  Neck: Supple, no LAD, no thyromegaly or carotid bruits  CVS: Normal S1, S2, no m/r/g  Resp: CTAB, no wheezes or rales  Extrem: Atraumatic, no cyanosis or edema  Feet: Skin dry but intact, no lesions. DP pulses 2+ b/l. Sensation intact to light touch and monofilament throughout. Pulses: 2+   Skin: Warm, dry  Neuro: Alert, oriented, appropriate      A/P:   Encounter Diagnoses     ICD-10-CM ICD-9-CM   1. Controlled type 2 diabetes mellitus with complication, without long-term current use of insulin ([facility])  E11.8 250.90   2. Encounter for screening mammogram for malignant neoplasm of breast  Z12.31 V76.12   3. Primary hypertension  I10 401.9   4. Mixed hyperlipidemia  E78.2 272.2   5. Encounter for immunization  Z23 V03.89     1. Encounter for screening mammogram for malignant neoplasm of breast:  - [facility] 3D ROBYN W MAMMO BI SCREENING INCL CAD; Future    2. Primary hypertension: BP elevated today, pt thinks related to salty food she ate yesterday. She has not been checking her BP at home but has a cuff and will start checking.   - METABOLIC PANEL, BASIC; Future    3. Controlled type 2 diabetes mellitus with complication, without long-term current use of insulin ([facility]): Has been diet controlled. - HEMOGLOBIN A1C WITH EAG; Future  -  DIABETES FOOT EXAM    4. Mixed hyperlipidemia: Not on statin. Will recheck and determine ASCVD risk.   - LIPID PANEL; Future    5. Encounter for immunization  - ADMIN INFLUENZA VIRUS VAC  - FLU (FLUAD QUAD INFLUENZA VACCINE,QUAD,ADJUVANTED)       RTC in 6 months for f/u chronic conditions, or sooner prn    Discussed diagnoses in detail with patient. Medication risks/benefits/side effects discussed with patient. All of the patient's questions were addressed. The patient understands and agrees with our plan of care. The patient knows to call back if they are unsure of or forget any changes we discussed today or if the symptoms change.   The patient received an After-Visit Summary which contains VS, orders, medication list and allergy list. This can be used as a \"mini-medical record\" should they have to seek medical care while out of [facility].    Current Outpatient Medications on File Prior to Visit   Medication Sig Dispense Refill    fluticasone propionate (Flonase Allergy Relief) 50 mcg/actuation nasal spray 1 spray in each nostril      albuterol (PROVENTIL VENTOLIN) 2.5 mg /3 mL (0.083 %) nebu 3 mL by Nebulization route every four (4) hours as needed for Wheezing. 1 Each 3    lisinopril-hydroCHLOROthiazide (PRINZIDE, ZESTORETIC) 20-25 mg per tablet Take 1 tablet by mouth once daily 90 Tablet 0    cholecalciferol (Vitamin D3) (1000 Units /25 mcg) tablet Take 1,000 Units by mouth daily.  loratadine (Claritin) 10 mg tablet Take 10 mg by mouth daily.  naproxen (NAPROSYN) 250 mg tablet Take 1 Tab by mouth two (2) times daily as needed for Pain. Take with meals. 30 Tab 0    albuterol (ProAir HFA) 90 mcg/actuation inhaler Take 1 puff every 4 hours as needed for shortness of breath or persistent nighttime cough. 1 Inhaler 6    cyanocobalamin (VITAMIN B12) 500 mcg tablet Take 1 Tab by mouth daily. gummie 90 Tab 1    multivit with min-folic acid (WOMEN'S MULTIVITAMIN GUMMIES) 200 mcg chew Take  by mouth.  Lactobacillus acidophilus (PROBIOTIC PO) Take  by mouth as needed.  Nebulizer & Compressor machine by Does Not Apply route. q4 hours with medication 1 Each 0    azelastine (ASTELIN) 137 mcg (0.1 %) nasal spray 1 [place] by Both Nostrils route two (2) times a day. Use in each nostril as directed (Patient not taking: Reported on 11/29/2021) 1 Bottle 2     No current facility-administered medications on file prior to visit.

## 2021-11-29 NOTE — PATIENT INSTRUCTIONS
Arti Jones with [facility]  [address] [place] [address] Drive, [address] [facility]  [phone]  Blood Pressure Record     Patient Name:  ______________________ :  ______________________    Date/Time BP Reading Pulse                                                                                                                                        DASH Diet: Care Instructions  Your Care Instructions     The DASH diet is an eating plan that can help lower your blood pressure. DASH stands for Dietary Approaches to Stop Hypertension. Hypertension is high blood pressure. The DASH diet focuses on eating foods that are high in calcium, potassium, and magnesium. These nutrients can lower blood pressure. The foods that are highest in these nutrients are fruits, vegetables, low-fat dairy products, nuts, seeds, and legumes. But taking calcium, potassium, and magnesium supplements instead of eating foods that are high in those nutrients does not have the same effect. The DASH diet also includes whole grains, fish, and poultry. The DASH diet is one of several lifestyle changes your doctor may recommend to lower your high blood pressure. Your doctor may also want you to decrease the amount of sodium in your diet. Lowering sodium while following the DASH diet can lower blood pressure even further than just the DASH diet alone. Follow-up care is a key part of your treatment and safety. Be sure to make and go to all appointments, and call your doctor if you are having problems. It's also a good idea to know your test results and keep a list of the medicines you take. How can you care for yourself at home? Following the DASH diet  · Eat 4 to 5 servings of fruit each day. A serving is 1 medium-sized piece of fruit, ½ cup chopped or canned fruit, 1/4 cup dried fruit, or 4 ounces (½ cup) of fruit juice. Choose fruit more often than fruit juice.   · Eat 4 to 5 servings of vegetables each day. A serving is 1 cup of lettuce or raw leafy vegetables, ½ cup of chopped or cooked vegetables, or 4 ounces (½ cup) of vegetable juice. Choose vegetables more often than vegetable juice. · Get 2 to 3 servings of low-fat and fat-free dairy each day. A serving is 8 ounces of milk, 1 cup of yogurt, or 1 ½ ounces of cheese. · Eat 6 to 8 servings of grains each day. A serving is 1 slice of bread, 1 ounce of dry cereal, or ½ cup of cooked rice, pasta, or cooked cereal. Try to choose whole-grain products as much as possible. · Limit lean meat, poultry, and fish to 2 servings each day. A serving is 3 ounces, about the size of a deck of cards. · Eat 4 to 5 servings of nuts, seeds, and legumes (cooked dried beans, lentils, and split peas) each week. A serving is 1/3 cup of nuts, 2 tablespoons of seeds, or ½ cup of cooked beans or peas. · Limit fats and oils to 2 to 3 servings each day. A serving is 1 teaspoon of vegetable oil or 2 tablespoons of salad dressing. · Limit sweets and added sugars to 5 servings or less a week. A serving is 1 tablespoon jelly or jam, ½ cup sorbet, or 1 cup of lemonade. · Eat less than 2,300 milligrams (mg) of sodium a day. If you limit your sodium to 1,500 mg a day, you can lower your blood pressure even more. · Be aware that all of these are the suggested number of servings for people who eat 1,800 to 2,000 calories a day. Your recommended number of servings may be different if you need more or fewer calories. Tips for success  · Start small. Do not try to make dramatic changes to your diet all at once. You might feel that you are missing out on your favorite foods and then be more likely to not follow the plan. Make small changes, and stick with them. Once those changes become habit, add a few more changes. · Try some of the following:  ? Make it a goal to eat a fruit or vegetable at every meal and at snacks.  This will make it easy to get the recommended amount of fruits and vegetables each day. ? Try yogurt topped with fruit and nuts for a snack or healthy dessert. ? Add lettuce, tomato, cucumber, and onion to sandwiches. ? Combine a ready-made pizza crust with low-fat mozzarella cheese and lots of vegetable toppings. Try using tomatoes, squash, spinach, broccoli, carrots, cauliflower, and onions. ? Have a variety of cut-up vegetables with a low-fat dip as an appetizer instead of chips and dip. ? Sprinkle sunflower seeds or chopped almonds over salads. Or try adding chopped walnuts or almonds to cooked vegetables. ? Try some vegetarian meals using beans and peas. Add garbanzo or kidney beans to salads. Make burritos and tacos with mashed rosas beans or black beans. Where can you learn more? Go to http://www.khan.com/  Enter H967 in the search box to learn more about \"DASH Diet: Care Instructions. \"  Current as of: April 29, 2021               Content Version: 13.0  © 7724-2238 [employer]. Care instructions adapted under license by [employer] (which disclaims liability or warranty for this information). If you have questions about a medical condition or this instruction, always ask your healthcare professional. [address] any warranty or liability for your use of this information.

## 2021-11-29 NOTE — TELEPHONE ENCOUNTER
CLINICAL PHARMACY: ADHERENCE REVIEW  Identified care gap per Tray; fills at [facility]: ACE/ARB adherence    Last Visit: today    ASSESSMENT  ACE/ARB ADHERENCE    Per Insurance Records through 11/21/21  84%; Potential Fail Date: 12/6/21):   LISINOP/HCTZ TAB 20-25MG last filled on 8/17/21 for 90 day supply. Next refill due: 11/15/21    Per Jerel Cabrales at [address]:   NR now. She will send to PCP for refill auth    BP Readings from Last 3 Encounters:   11/29/21 (!) 175/81   10/28/21 (!) 142/84   05/10/21 126/74     CrCl cannot be calculated (Patient's most recent lab result is older than the maximum 180 days allowed. ). PLAN  The following are interventions that have been identified:  - Patient overdue refilling LISINOP/HCTZ TAB 20-25MG and active on home medication list    [facility] staff sent refill auth to pcp- just seen today. Future Appointments   Date Time Provider Lisa Hoyos   12/10/2021  8:20 AM Charisma Sadler DO [facility] BS AMB       Denia Ashley CPNai.    [address]  [phone]

## 2021-11-29 NOTE — PROGRESS NOTES
1. Have you been to the ER, urgent care clinic since your last visit? Hospitalized since your last visit? No    2. Have you seen or consulted any other health care providers outside of the [address] [place] [address] since your last visit? Include any pap smears or colon screening.  No  Reviewed record in preparation for visit and have necessary documentation  Pt did not bring medication to office visit for review    Goals that were addressed and/or need to be completed during or after this appointment include   Health Maintenance Due   Topic Date Due    DTaP/Tdap/Td series (1 - Tdap) Never done    Shingrix Vaccine Age 50> (1 of 2) Never done    Pneumococcal 65+ years (2 of 2 - PPSV23) 06/25/2020    Eye Exam Retinal or Dilated  06/25/2021    Flu Vaccine (1) 09/01/2021    Breast Cancer Screen Mammogram  09/18/2021    COVID-19 Vaccine (3 - Booster for Moderna series) 11/06/2021    A1C test (Diabetic or Prediabetic)  11/10/2021    Medicare Yearly Exam  11/10/2021

## 2021-12-02 NOTE — TELEPHONE ENCOUNTER
2nd call as reminder to  refill.     Verified lis/hctz was picked up and billed to [facility] in place: No   Recommendation Provided To: Pharmacy: 1   Intervention Detail: Adherence Monitorin   Gap Closed?: Yes   Intervention Accepted By: Pharmacy: 1   Time Spent (min): 15

## 2021-12-28 LAB
ANION GAP SERPL CALC-SCNC: 5 MMOL/L (ref 5–15)
BUN SERPL-MCNC: 19 MG/DL (ref 6–20)
BUN/CREAT SERPL: 22 (ref 12–20)
CALCIUM SERPL-MCNC: 9.4 MG/DL (ref 8.5–10.1)
CHLORIDE SERPL-SCNC: 108 MMOL/L (ref 97–108)
CHOLEST SERPL-MCNC: 171 MG/DL
CO2 SERPL-SCNC: 27 MMOL/L (ref 21–32)
CREAT SERPL-MCNC: 0.85 MG/DL (ref 0.55–1.02)
EST. AVERAGE GLUCOSE BLD GHB EST-MCNC: 140 MG/DL
GLUCOSE SERPL-MCNC: 88 MG/DL (ref 65–100)
HBA1C MFR BLD: 6.5 % (ref 4–5.6)
HDLC SERPL-MCNC: 64 MG/DL
HDLC SERPL: 2.7 {RATIO} (ref 0–5)
LDLC SERPL CALC-MCNC: 92.8 MG/DL (ref 0–100)
POTASSIUM SERPL-SCNC: 4.5 MMOL/L (ref 3.5–5.1)
SODIUM SERPL-SCNC: 140 MMOL/L (ref 136–145)
TRIGL SERPL-MCNC: 71 MG/DL (ref ?–150)
VLDLC SERPL CALC-MCNC: 14.2 MG/DL

## 2022-01-13 ENCOUNTER — OFFICE VISIT (OUTPATIENT)
Dept: FAMILY MEDICINE CLINIC | Age: 67
End: 2022-01-13
Payer: MEDICARE

## 2022-01-13 VITALS
WEIGHT: 195 LBS | DIASTOLIC BLOOD PRESSURE: 87 MMHG | HEIGHT: 64 IN | OXYGEN SATURATION: 97 % | RESPIRATION RATE: 20 BRPM | BODY MASS INDEX: 33.29 KG/M2 | TEMPERATURE: 99.1 F | HEART RATE: 84 BPM | SYSTOLIC BLOOD PRESSURE: 142 MMHG

## 2022-01-13 DIAGNOSIS — Z00.00 MEDICARE ANNUAL WELLNESS VISIT, SUBSEQUENT: Primary | ICD-10-CM

## 2022-01-13 PROCEDURE — G8754 DIAS BP LESS 90: HCPCS | Performed by: STUDENT IN AN ORGANIZED HEALTH CARE EDUCATION/TRAINING PROGRAM

## 2022-01-13 PROCEDURE — 3017F COLORECTAL CA SCREEN DOC REV: CPT | Performed by: STUDENT IN AN ORGANIZED HEALTH CARE EDUCATION/TRAINING PROGRAM

## 2022-01-13 PROCEDURE — G8399 PT W/DXA RESULTS DOCUMENT: HCPCS | Performed by: STUDENT IN AN ORGANIZED HEALTH CARE EDUCATION/TRAINING PROGRAM

## 2022-01-13 PROCEDURE — G0439 PPPS, SUBSEQ VISIT: HCPCS | Performed by: STUDENT IN AN ORGANIZED HEALTH CARE EDUCATION/TRAINING PROGRAM

## 2022-01-13 PROCEDURE — G8427 DOCREV CUR MEDS BY ELIG CLIN: HCPCS | Performed by: STUDENT IN AN ORGANIZED HEALTH CARE EDUCATION/TRAINING PROGRAM

## 2022-01-13 PROCEDURE — G8536 NO DOC ELDER MAL SCRN: HCPCS | Performed by: STUDENT IN AN ORGANIZED HEALTH CARE EDUCATION/TRAINING PROGRAM

## 2022-01-13 PROCEDURE — G8753 SYS BP > OR = 140: HCPCS | Performed by: STUDENT IN AN ORGANIZED HEALTH CARE EDUCATION/TRAINING PROGRAM

## 2022-01-13 PROCEDURE — 1101F PT FALLS ASSESS-DOCD LE1/YR: CPT | Performed by: STUDENT IN AN ORGANIZED HEALTH CARE EDUCATION/TRAINING PROGRAM

## 2022-01-13 PROCEDURE — G8417 CALC BMI ABV UP PARAM F/U: HCPCS | Performed by: STUDENT IN AN ORGANIZED HEALTH CARE EDUCATION/TRAINING PROGRAM

## 2022-01-13 PROCEDURE — G9899 SCRN MAM PERF RSLTS DOC: HCPCS | Performed by: STUDENT IN AN ORGANIZED HEALTH CARE EDUCATION/TRAINING PROGRAM

## 2022-01-13 PROCEDURE — G8432 DEP SCR NOT DOC, RNG: HCPCS | Performed by: STUDENT IN AN ORGANIZED HEALTH CARE EDUCATION/TRAINING PROGRAM

## 2022-01-13 NOTE — PATIENT INSTRUCTIONS
A Healthy Lifestyle: Care Instructions  Your Care Instructions     A healthy lifestyle can help you feel good, stay at a healthy weight, and have plenty of energy for both work and play. A healthy lifestyle is something you can share with your whole family. A healthy lifestyle also can lower your risk for serious health problems, such as high blood pressure, heart disease, and diabetes. You can follow a few steps listed below to improve your health and the health of your family. Follow-up care is a key part of your treatment and safety. Be sure to make and go to all appointments, and call your doctor if you are having problems. It's also a good idea to know your test results and keep a list of the medicines you take. How can you care for yourself at home? · Do not eat too much sugar, fat, or fast foods. You can still have dessert and treats now and then. The goal is moderation. · Start small to improve your eating habits. Pay attention to portion sizes, drink less juice and soda pop, and eat more fruits and vegetables. ? Eat a healthy amount of food. A 3-ounce serving of meat, for example, is about the size of a deck of cards. Fill the rest of your plate with vegetables and whole grains. ? Limit the amount of soda and sports drinks you have every day. Drink more water when you are thirsty. ? Eat plenty of fruits and vegetables every day. Have an apple or some carrot sticks as an afternoon snack instead of a candy bar. Try to have fruits and/or vegetables at every meal.  · Make exercise part of your daily routine. You may want to start with simple activities, such as walking, bicycling, or slow swimming. Try to be active 30 to 60 minutes every day. You do not need to do all 30 to 60 minutes all at once. For example, you can exercise 3 times a day for 10 or 20 minutes.  Moderate exercise is safe for most people, but it is always a good idea to talk to your doctor before starting an exercise program.  · Keep moving. Sharon Hasten the lawn, work in the garden, or [place]. Take the stairs instead of the elevator at work. · If you smoke, quit. People who smoke have an increased risk for heart attack, stroke, cancer, and other lung illnesses. Quitting is hard, but there are ways to boost your chance of quitting tobacco for good. ? Use nicotine gum, patches, or lozenges. ? Ask your doctor about stop-smoking programs and medicines. ? Keep trying. In addition to reducing your risk of diseases in the future, you will notice some benefits soon after you stop using tobacco. If you have shortness of breath or asthma symptoms, they will likely get better within a few weeks after you quit. · Limit how much alcohol you drink. Moderate amounts of alcohol (up to 2 drinks a day for men, 1 drink a day for women) are okay. But drinking too much can lead to liver problems, high blood pressure, and other health problems. Family health  If you have a family, there are many things you can do together to improve your health. · Eat meals together as a family as often as possible. · Eat healthy foods. This includes fruits, vegetables, lean meats and dairy, and whole grains. · Include your family in your fitness plan. Most people think of activities such as jogging or tennis as the way to fitness, but there are many ways you and your family can be more active. Anything that makes you breathe hard and gets your heart pumping is exercise. Here are some tips:  ? Walk to do errands or to take your child to school or the bus.  ? Go for a family bike ride after dinner instead of watching TV. Where can you learn more? Go to http://www.gray.com/  Enter S654 in the search box to learn more about \"A Healthy Lifestyle: Care Instructions. \"  Current as of: June 16, 2021               Content Version: 13.0  © 5003-1691 Healthwise[employer].    Care instructions adapted under license by [employer] (which disclaims liability or warranty for this information). If you have questions about a medical condition or this instruction, always ask your healthcare professional. [address] any warranty or liability for your use of this information. Medicare Wellness Visit, Female     The best way to live healthy is to have a lifestyle where you eat a well-balanced diet, exercise regularly, limit alcohol use, and quit all forms of tobacco/nicotine, if applicable. Regular preventive services are another way to keep healthy. Preventive services (vaccines, screening tests, monitoring & exams) can help personalize your care plan, which helps you manage your own care. Screening tests can find health problems at the earliest stages, when they are easiest to treat. [facility] follows the current, evidence-based guidelines published by the [facility] Tian Dwyer ([facility]) when recommending preventive services for our patients. Because we follow these guidelines, sometimes recommendations change over time as research supports it. (For example, mammograms used to be recommended annually. Even though Medicare will still pay for an annual mammogram, the newer guidelines recommend a mammogram every two years for women of average risk). Of course, you and your doctor may decide to screen more often for some diseases, based on your risk and your co-morbidities (chronic disease you are already diagnosed with). Preventive services for you include:  - Medicare offers their members a free annual wellness visit, which is time for you and your primary care provider to discuss and plan for your preventive service needs. Take advantage of this benefit every year!  -All adults over the age of 72 should receive the recommended pneumonia vaccines.  Current USPSTF guidelines recommend a series of two vaccines for the best pneumonia protection.   -All adults should have a flu vaccine yearly and a tetanus vaccine every 10 years.   -All adults age 48 and older should receive the shingles vaccines (series of two vaccines). -All adults age 38-68 who are overweight should have a diabetes screening test once every three years.   -All adults born between [birth year] and 1965 should be screened once for Hepatitis C.  -Other screening tests and preventive services for persons with diabetes include: an eye exam to screen for diabetic retinopathy, a kidney function test, a foot exam, and stricter control over your cholesterol.   -Cardiovascular screening for adults with routine risk involves an electrocardiogram (ECG) at intervals determined by your doctor.   -Colorectal cancer screenings should be done for adults age 54-65 with no increased risk factors for colorectal cancer. There are a number of acceptable methods of screening for this type of cancer. Each test has its own benefits and drawbacks. Discuss with your doctor what is most appropriate for you during your annual wellness visit. The different tests include: colonoscopy (considered the best screening method), a fecal occult blood test, a fecal DNA test, and sigmoidoscopy.    -A bone mass density test is recommended when a woman turns 65 to screen for osteoporosis. This test is only recommended one time, as a screening. Some providers will use this same test as a disease monitoring tool if you already have osteoporosis. -Breast cancer screenings are recommended every other year for women of normal risk, age 54-69.  -Cervical cancer screenings for women over age 72 are only recommended with certain risk factors.      Here is a list of your current Health Maintenance items (your personalized list of preventive services) with a due date:  Health Maintenance Due   Topic Date Due    DTaP/Tdap/Td  (1 - Tdap) Never done    Shingles Vaccine (1 of 2) Never done    Pneumococcal Vaccine (2 of 2 - PPSV23) 06/25/2020    Eye Exam 06/25/2021    Mammogram  09/18/2021    COVID-19 Vaccine (3 - Booster for Moderna series) 11/06/2021    Annual Well Visit  11/10/2021         Medicare Wellness Visit, Female     The best way to live healthy is to have a lifestyle where you eat a well-balanced diet, exercise regularly, limit alcohol use, and quit all forms of tobacco/nicotine, if applicable. Regular preventive services are another way to keep healthy. Preventive services (vaccines, screening tests, monitoring & exams) can help personalize your care plan, which helps you manage your own care. Screening tests can find health problems at the earliest stages, when they are easiest to treat. [facility] follows the current, evidence-based guidelines published by the [facility] Tian Dwyer ([facility]) when recommending preventive services for our patients. Because we follow these guidelines, sometimes recommendations change over time as research supports it. (For example, mammograms used to be recommended annually. Even though Medicare will still pay for an annual mammogram, the newer guidelines recommend a mammogram every two years for women of average risk). Of course, you and your doctor may decide to screen more often for some diseases, based on your risk and your co-morbidities (chronic disease you are already diagnosed with). Preventive services for you include:  - Medicare offers their members a free annual wellness visit, which is time for you and your primary care provider to discuss and plan for your preventive service needs. Take advantage of this benefit every year!  -All adults over the age of 72 should receive the recommended pneumonia vaccines.  Current USPSTF guidelines recommend a series of two vaccines for the best pneumonia protection.   -All adults should have a flu vaccine yearly and a tetanus vaccine every 10 years.   -All adults age 48 and older should receive the shingles vaccines (series of two vaccines). -All adults age 38-68 who are overweight should have a diabetes screening test once every three years.   -All adults born between 80 and 1965 should be screened once for Hepatitis C.  -Other screening tests and preventive services for persons with diabetes include: an eye exam to screen for diabetic retinopathy, a kidney function test, a foot exam, and stricter control over your cholesterol.   -Cardiovascular screening for adults with routine risk involves an electrocardiogram (ECG) at intervals determined by your doctor.   -Colorectal cancer screenings should be done for adults age 54-65 with no increased risk factors for colorectal cancer. There are a number of acceptable methods of screening for this type of cancer. Each test has its own benefits and drawbacks. Discuss with your doctor what is most appropriate for you during your annual wellness visit. The different tests include: colonoscopy (considered the best screening method), a fecal occult blood test, a fecal DNA test, and sigmoidoscopy.    -A bone mass density test is recommended when a woman turns 65 to screen for osteoporosis. This test is only recommended one time, as a screening. Some providers will use this same test as a disease monitoring tool if you already have osteoporosis. -Breast cancer screenings are recommended every other year for women of normal risk, age 54-69.  -Cervical cancer screenings for women over age 72 are only recommended with certain risk factors.      Here is a list of your current Health Maintenance items (your personalized list of preventive services) with a due date:  Health Maintenance Due   Topic Date Due    DTaP/Tdap/Td  (1 - Tdap) Never done    Shingles Vaccine (1 of 2) Never done    Pneumococcal Vaccine (2 of 2 - PPSV23) 06/25/2020    Eye Exam  06/25/2021    Mammogram  09/18/2021    COVID-19 Vaccine (3 - Booster for [place] Estimable series) 11/06/2021

## 2022-01-13 NOTE — PROGRESS NOTES
1. Have you been to the ER, urgent care clinic since your last visit? Hospitalized since your last visit? No    2. Have you seen or consulted any other health care providers outside of the [address] [place] [address] since your last visit? Include any pap smears or colon screening.  No  Reviewed record in preparation for visit and have necessary documentation  Pt did not bring medication to office visit for review    Goals that were addressed and/or need to be completed during or after this appointment include   Health Maintenance Due   Topic Date Due    DTaP/Tdap/Td series (1 - Tdap) Never done    Shingrix Vaccine Age 50> (1 of 2) Never done    Pneumococcal 65+ years (2 of 2 - PPSV23) 06/25/2020    Eye Exam Retinal or Dilated  06/25/2021    Breast Cancer Screen Mammogram  09/18/2021    COVID-19 Vaccine (3 - Booster for Moderna series) 11/06/2021    Medicare Yearly Exam  11/10/2021

## 2022-01-13 NOTE — ACP (ADVANCE CARE PLANNING)
Advance Care Planning     Advance Care Planning (ACP) Physician/NP/PA Conversation      Date of Conversation: 1/13/2022  Conducted with: Patient with Decision Making Capacity    Healthcare Decision Maker:   No healthcare decision makers have been documented. Click here to complete [address] including selection of the Healthcare Decision Maker Relationship (ie \"Primary\")      Today we documented desired Decision Maker(s), who is (are) NOT Legal Next of Kin. ACP documents are required for decision maker authority. Primary decision maker : Fran Rust (child) [phone]  Care Preferences:    Hospitalization: \"If your health worsens and it becomes clear that your chance of recovery is unlikely, what would be your preference regarding hospitalization? \"  The patient would prefer hospitalization. Ventilation: \"If you were unable to breathe on your own and your chance of recovery was unlikely, what would be your preference about the use of a ventilator (breathing machine) if it was available to you? \"   The patient would desire the use of a ventilator. Resuscitation: \"In the event your heart stopped as a result of an underlying serious health condition, would you want attempts to be made to restart your heart, or would you prefer a natural death? \"   Yes, attempt to resuscitate.         Conversation Outcomes / Follow-Up Plan:   ACP complete - no further action today  Reviewed DNR/DNI and patient elects Full Code (Attempt Resuscitation)     Length of Voluntary ACP Conversation in minutes:  <16 minutes (Non-Billable)    Kosta Lopez DO

## 2022-01-13 NOTE — PROGRESS NOTES
This is the Subsequent Medicare Annual Wellness Exam, performed 12 months or more after the Initial AWV or the last Subsequent AWV    I have reviewed the patient's medical history in detail and updated the computerized patient record. Assessment/Plan   Education and counseling provided:  Are appropriate based on today's review and evaluation    1. Medicare annual wellness visit, subsequent  -     FULL CODE       Depression Risk Factor Screening     3 most recent PHQ Screens 1/13/2022   Little interest or pleasure in doing things Several days   Feeling down, depressed, irritable, or hopeless Several days   Total Score PHQ 2 2   Trouble falling or staying asleep, or sleeping too much Several days   Feeling tired or having little energy More than half the days   Poor appetite, weight loss, or overeating Several days   Feeling bad about yourself - or that you are a failure or have let yourself or your family down Not at all   Trouble concentrating on things such as school, work, reading, or watching TV Not at all   Moving or speaking so slowly that other people could have noticed; or the opposite being so fidgety that others notice Not at all   Thoughts of being better off dead, or hurting yourself in some way Not at all   PHQ 9 Score 6   How difficult have these problems made it for you to do your work, take care of your home and get along with others -       Alcohol & Drug Abuse Risk Screen    Do you average more than 1 drink per night or more than 7 drinks a week:  No    On any one occasion in the past three months have you have had more than 3 drinks containing alcohol:  No          Functional Ability and Level of Safety    Hearing: Hearing is good. Activities of Daily Living: The home contains: handrails and rugs  Patient does total self care      Ambulation: with no difficulty     Fall Risk:  Fall Risk Assessment, last 12 mths 1/13/2022   Able to walk?  Yes   Fall in past 12 months? 0   Do you feel unsteady?  0   Are you worried about falling 0      Abuse Screen:  Patient is not abused       Cognitive Screening    Has your family/caregiver stated any concerns about your memory: no         Health Maintenance Due     Health Maintenance Due   Topic Date Due    DTaP/Tdap/Td series (1 - Tdap) Never done    Shingrix Vaccine Age 50> (1 of 2) Never done    Pneumococcal 65+ years (2 of 2 - PPSV23) 06/25/2020    Eye Exam Retinal or Dilated  06/25/2021    Breast Cancer Screen Mammogram  09/18/2021    COVID-19 Vaccine (3 - Booster for Cristine Lied series) 11/06/2021       Patient Care Team   Patient Care Team:  Ricky Hernandez MD as PCP - General (Family Medicine)  Ricky Hernandez MD as PCP - [address] [place] [address] [facility] Provider    History     Patient Active Problem List   Diagnosis Code    Asthma J45.909    HTN (hypertension) I10    AR (allergic rhinitis) J30.9    Alopecia, unspecified L65.9    Mammogram abnormal R92.8    Microscopic hematuria R31.29    Abnormal CPK R74.8    Controlled type 2 diabetes mellitus with complication, without long-term current use of insulin (HCC) E11.8    Knee pain M25.569    Low back pain M54.50    Severe obesity ([address] E66.01     Past Medical History:   Diagnosis Date    Asthma     [facility]     Diabetes ([address]     Hx of seasonal allergies     Hypertension       Past Surgical History:   Procedure Laterality Date    BIOPSY BREAST      X3    HX HYSTERECTOMY      HX TONSILLECTOMY      FL BREAST SURGERY PROCEDURE UNLISTED      bilateral breast lumpectomy     Current Outpatient Medications   Medication Sig Dispense Refill    lisinopril-hydroCHLOROthiazide (PRINZIDE, ZESTORETIC) 20-25 mg per tablet Take 1 tablet by mouth once daily 90 Tablet 1    fluticasone propionate (Flonase Allergy Relief) 50 mcg/actuation nasal spray 1 spray in each nostril      albuterol (PROVENTIL VENTOLIN) 2.5 mg /3 mL (0.083 %) nebu 3 mL by Nebulization route every four (4) hours as needed for Wheezing. 1 Each 3    cholecalciferol (Vitamin D3) (1000 Units /25 mcg) tablet Take 1,000 Units by mouth daily.  loratadine (Claritin) 10 mg tablet Take 10 mg by mouth daily.  naproxen (NAPROSYN) 250 mg tablet Take 1 Tab by mouth two (2) times daily as needed for Pain. Take with meals. 30 Tab 0    albuterol (ProAir HFA) 90 mcg/actuation inhaler Take 1 puff every 4 hours as needed for shortness of breath or persistent nighttime cough. 1 Inhaler 6    cyanocobalamin (VITAMIN B12) 500 mcg tablet Take 1 Tab by mouth daily. gummie 90 Tab 1    multivit with min-folic acid (WOMEN'S MULTIVITAMIN GUMMIES) 200 mcg chew Take  by mouth.  Lactobacillus acidophilus (PROBIOTIC PO) Take  by mouth as needed.  Nebulizer & Compressor machine by Does Not Apply route. q4 hours with medication 1 Each 0     Allergies   Allergen Reactions    Oxycodone-Acetaminophen Other (comments)     felt like \"out of body experience\"  Other reaction(s):  Other (comments)  felt like \"out of body experience\"     [place]a [place] [Oxycodone Hcl-Oxycodone-Asa] Other (comments)     felt like \"out of body experience\"       Family History   Problem Relation Age of Onset    Hypertension Mother     Hypertension Father     Dementia Father     Stroke Father     Diabetes Sister      Social History     Tobacco Use    Smoking status: Former Smoker     Quit date: 1974     Years since quittin.4    Smokeless tobacco: Never Used   Substance Use Topics    Alcohol use: Yes     Comment: infrequent         Isa Odor, DO

## 2022-03-19 PROBLEM — E11.8 CONTROLLED TYPE 2 DIABETES MELLITUS WITH COMPLICATION, WITHOUT LONG-TERM CURRENT USE OF INSULIN (HCC): Status: ACTIVE | Noted: 2019-01-10

## 2022-03-19 PROBLEM — M54.50 LOW BACK PAIN: Status: ACTIVE | Noted: 2019-07-03

## 2022-03-19 PROBLEM — E66.01 SEVERE OBESITY (HCC): Status: ACTIVE | Noted: 2019-11-26

## 2022-03-20 PROBLEM — M25.569 KNEE PAIN: Status: ACTIVE | Noted: 2019-07-03

## 2022-06-16 ENCOUNTER — TELEPHONE (OUTPATIENT)
Dept: FAMILY MEDICINE CLINIC | Age: 67
End: 2022-06-16

## 2022-06-16 DIAGNOSIS — Z12.11 SCREENING FOR MALIGNANT NEOPLASM OF COLON: Primary | ICD-10-CM

## 2022-06-16 NOTE — TELEPHONE ENCOUNTER
Pt states she still has not had her Mammogram. Pt would like to go to [facility] for this. Pt also states she would like to get set up for her colonoscopy. Please advise.

## 2022-06-16 NOTE — PROGRESS NOTES
April D   75 y.o. female  1955  [address] [place] [address]  [id number]    [phone] (home)      [address]:    Telephone Encounter  Satish CHEN       Encounter Date: 6/17/2022    Consent:  She and/or the health care decision maker is aware that that she may receive a bill for this telephone service, depending on her insurance coverage, and has provided verbal consent to proceed: Yes    No chief complaint on file. History of Present Illness   April D Airam Lennon is a 75 y.o. female was evaluated by telephone. Complains of sinus congestion, drainage, and pressure. Started clear and was getting better, then started to get worse. Now having a lot of facial pain and pressure. Taking Flonase and Claritin. Has asthma but has not had any wheezing or cough. No fever, chills, facial pain, chest pain, sob. Had COVID in January, is vaccinated. Review of Systems   See HPI    Vitals/Objective:     General: alert, cooperative, no distress   Mental  status: mental status: alert, oriented to person, place, and time, normal mood, behavior, motor activity, and thought processes   Resp: resp: normal effort and no respiratory distress           Due to this being a TeleHealth evaluation, many elements of the physical examination are unable to be assessed. Assessment and Plan:   Time-based coding, delete if not needed: I spent at least 15 minutes with this established patient, and >50% of the time was spent counseling and/or coordinating care regarding allergies  Total Time: minutes: 11-20 minutes    1. Acute non-recurrent maxillary sinusitis: Has trouble swallowing larger pills like Augmentin, patient says Sonam has worked for her sinus infections in the past.   - doxycycline (MONODOX) 100 mg capsule; Take 1 Capsule by mouth two (2) times a day for 7 days. Dispense: 14 Capsule;  Refill: 0      I affirm this is a Patient Initiated Episode with an Established Patient who has not had a related appointment within my department in the past 7 days or scheduled within the next 24 hours. Note: not billable if this call serves to triage the patient into an appointment for the relevant concern     We discussed the expected course, resolution and complications of the diagnosis(es) in detail. Medication risks, benefits, costs, interactions, and alternatives were discussed as indicated. I advised her to contact the office if her condition worsens, changes or fails to improve as anticipated. She expressed understanding with the diagnosis(es) and plan. Patient understands that this encounter was a temporary measure, and the importance of further follow up and examination was emphasized. Patient verbalized understanding. Electronically Signed: Felicia Be DO    Providers location when delivering service: home    CPT:  91304 (5-10 minutes)  90288 (11-20 minutes)  21  (21-30 minutes)      ICD-10-CM ICD-9-CM    1. Acute non-recurrent maxillary sinusitis  J01.00 461.0 doxycycline (MONODOX) 100 mg capsule       Pursuant to the emergency declaration under the [facility], [facility] waiver authority and the [employer] Act, this Virtual  Visit was conducted, with patient's consent, to reduce the patient's risk of exposure to COVID-19 and provide continuity of care for an established patient. Services were provided through a video synchronous discussion virtually to substitute for in-person clinic visit. History   Patients past medical, surgical and family histories were personally reviewed and updated.       Past Medical History:   Diagnosis Date    Asthma     [facility] cancer ([facility]     Diabetes ([facility]     Hx of seasonal allergies     Hypertension      Past Surgical History:   Procedure Laterality Date    BIOPSY BREAST      X3    HX HYSTERECTOMY      HX TONSILLECTOMY      HI BREAST SURGERY PROCEDURE UNLISTED      bilateral breast lumpectomy     Family History   Problem Relation Age of Onset    Hypertension Mother     Hypertension Father     Dementia Father     Stroke Father     Diabetes Sister      Social History     Socioeconomic History    Marital status:      Spouse name: Not on file    Number of children: Not on file    Years of education: Not on file    Highest education level: Not on file   Occupational History    Not on file   Tobacco Use    Smoking status: Former Smoker     Quit date: 1974     Years since quittin.8    Smokeless tobacco: Never Used   Substance and Sexual Activity    Alcohol use: Yes     Comment: infrequent    Drug use: No    Sexual activity: Never   Other Topics Concern    Not on file   Social History Narrative    Not on file     Social Determinants of Health     Financial Resource Strain:     Difficulty of Paying Living Expenses: Not on file   Food Insecurity:     Worried About [employer] in the Last Year: Not on file    Nallely of Food in the Last Year: Not on file   Transportation Needs:     Lack of Transportation (Medical): Not on file    Lack of Transportation (Non-Medical):  Not on file   Physical Activity:     Days of Exercise per Week: Not on file    Minutes of Exercise per Session: Not on file   Stress:     Feeling of Stress : Not on file   Social Connections:     Frequency of Communication with Friends and Family: Not on file    Frequency of Social Gatherings with Friends and Family: Not on file    Attends Muslim Services: Not on file    Active Member of Clubs or Organizations: Not on file    Attends Club or Organization Meetings: Not on file    Marital Status: Not on file   Intimate Partner Violence:     Fear of Current or Ex-Partner: Not on file    Emotionally Abused: Not on file    Physically Abused: Not on file    Sexually Abused: Not on file   Housing Stability:     Unable to Pay for Housing in the Last Year: Not on file    Number of Places Lived in the Last Year: Not on file    Unstable Housing in the Last Year: Not on file            Current Medications/Allergies   Medications and Allergies reviewed:    Current Outpatient Medications   Medication Sig Dispense Refill    doxycycline (MONODOX) 100 mg capsule Take 1 Capsule by mouth two (2) times a day for 7 days. 14 Capsule 0    lisinopril-hydroCHLOROthiazide (PRINZIDE, ZESTORETIC) 20-25 mg per tablet Take 1 tablet by mouth once daily 90 Tablet 1    fluticasone propionate (Flonase Allergy Relief) 50 mcg/actuation nasal spray 1 spray in each nostril      albuterol (PROVENTIL VENTOLIN) 2.5 mg /3 mL (0.083 %) nebu 3 mL by Nebulization route every four (4) hours as needed for Wheezing. 1 Each 3    cholecalciferol (Vitamin D3) (1000 Units /25 mcg) tablet Take 1,000 Units by mouth daily.  loratadine (Claritin) 10 mg tablet Take 10 mg by mouth daily.  naproxen (NAPROSYN) 250 mg tablet Take 1 Tab by mouth two (2) times daily as needed for Pain. Take with meals. 30 Tab 0    albuterol (ProAir HFA) 90 mcg/actuation inhaler Take 1 puff every 4 hours as needed for shortness of breath or persistent nighttime cough. 1 Inhaler 6    cyanocobalamin (VITAMIN B12) 500 mcg tablet Take 1 Tab by mouth daily. gummie 90 Tab 1    multivit with min-folic acid (WOMEN'S MULTIVITAMIN GUMMIES) 200 mcg chew Take  by mouth.  Lactobacillus acidophilus (PROBIOTIC PO) Take  by mouth as needed.  Nebulizer & Compressor machine by Does Not Apply route. q4 hours with medication 1 Each 0     Allergies   Allergen Reactions    Oxycodone-Acetaminophen Other (comments)     felt like \"out of body experience\"  Other reaction(s):  Other (comments)  felt like \"out of body experience\"      Percodan [Oxycodone Hcl-Oxycodone-Asa] Other (comments)     felt like \"out of body experience\"

## 2022-06-17 ENCOUNTER — VIRTUAL VISIT (OUTPATIENT)
Dept: FAMILY MEDICINE CLINIC | Age: 67
End: 2022-06-17
Payer: MEDICARE

## 2022-06-17 DIAGNOSIS — J01.00 ACUTE NON-RECURRENT MAXILLARY SINUSITIS: Primary | ICD-10-CM

## 2022-06-17 PROCEDURE — 99442 PR PHYS/QHP TELEPHONE EVALUATION 11-20 MIN: CPT | Performed by: STUDENT IN AN ORGANIZED HEALTH CARE EDUCATION/TRAINING PROGRAM

## 2022-06-17 RX ORDER — DOXYCYCLINE 100 MG/1
100 CAPSULE ORAL 2 TIMES DAILY
Qty: 14 CAPSULE | Refills: 0 | Status: SHIPPED | OUTPATIENT
Start: 2022-06-17 | End: 2022-06-24

## 2022-06-20 NOTE — PROGRESS NOTES
[address] Medicine Residency Attending Addendum:  Dr. Loni Hernandez, DO,  the patient and I were not physically present during this encounter. The resident and I are concurrently monitoring the patient care through appropriate telecommunication technology. I discussed the findings, assessment and plan with the resident and agree with the resident's findings and plan as documented in the resident's note.       Lalita Medel MD

## 2022-07-14 DIAGNOSIS — I10 ESSENTIAL HYPERTENSION: ICD-10-CM

## 2022-07-15 RX ORDER — LISINOPRIL AND HYDROCHLOROTHIAZIDE 20; 25 MG/1; MG/1
TABLET ORAL
Qty: 90 TABLET | Refills: 0 | Status: SHIPPED | OUTPATIENT
Start: 2022-07-15 | End: 2022-10-21

## 2022-07-15 NOTE — TELEPHONE ENCOUNTER
Attempted to call. No answer. Message left. Patient will need appt for follow up chronic conditions.

## 2022-07-22 ENCOUNTER — HOSPITAL ENCOUNTER (OUTPATIENT)
Dept: MAMMOGRAPHY | Age: 67
Discharge: HOME | End: 2022-07-22
Attending: FAMILY MEDICINE
Payer: MEDICARE

## 2022-07-22 DIAGNOSIS — Z12.31 ENCOUNTER FOR SCREENING MAMMOGRAM FOR MALIGNANT NEOPLASM OF BREAST: ICD-10-CM

## 2022-07-22 PROCEDURE — 77063 BREAST TOMOSYNTHESIS BI: CPT

## 2022-09-16 ENCOUNTER — OFFICE VISIT (OUTPATIENT)
Dept: FAMILY MEDICINE CLINIC | Age: 67
End: 2022-09-16
Payer: MEDICARE

## 2022-09-16 VITALS
SYSTOLIC BLOOD PRESSURE: 146 MMHG | BODY MASS INDEX: 33.46 KG/M2 | HEIGHT: 64 IN | TEMPERATURE: 98.6 F | DIASTOLIC BLOOD PRESSURE: 87 MMHG | HEART RATE: 62 BPM | RESPIRATION RATE: 18 BRPM | WEIGHT: 196 LBS | OXYGEN SATURATION: 96 %

## 2022-09-16 DIAGNOSIS — M54.50 ACUTE BILATERAL LOW BACK PAIN WITHOUT SCIATICA: ICD-10-CM

## 2022-09-16 DIAGNOSIS — M17.0 OSTEOARTHRITIS OF BOTH KNEES, UNSPECIFIED OSTEOARTHRITIS TYPE: ICD-10-CM

## 2022-09-16 DIAGNOSIS — I10 PRIMARY HYPERTENSION: ICD-10-CM

## 2022-09-16 DIAGNOSIS — E11.8 CONTROLLED TYPE 2 DIABETES MELLITUS WITH COMPLICATION, WITHOUT LONG-TERM CURRENT USE OF INSULIN (HCC): Primary | ICD-10-CM

## 2022-09-16 DIAGNOSIS — Z23 ENCOUNTER FOR IMMUNIZATION: ICD-10-CM

## 2022-09-16 PROCEDURE — G0008 ADMIN INFLUENZA VIRUS VAC: HCPCS | Performed by: FAMILY MEDICINE

## 2022-09-16 PROCEDURE — G8754 DIAS BP LESS 90: HCPCS | Performed by: FAMILY MEDICINE

## 2022-09-16 PROCEDURE — 3017F COLORECTAL CA SCREEN DOC REV: CPT | Performed by: FAMILY MEDICINE

## 2022-09-16 PROCEDURE — G8536 NO DOC ELDER MAL SCRN: HCPCS | Performed by: FAMILY MEDICINE

## 2022-09-16 PROCEDURE — 1090F PRES/ABSN URINE INCON ASSESS: CPT | Performed by: FAMILY MEDICINE

## 2022-09-16 PROCEDURE — 99214 OFFICE O/P EST MOD 30 MIN: CPT | Performed by: FAMILY MEDICINE

## 2022-09-16 PROCEDURE — G8753 SYS BP > OR = 140: HCPCS | Performed by: FAMILY MEDICINE

## 2022-09-16 PROCEDURE — 1101F PT FALLS ASSESS-DOCD LE1/YR: CPT | Performed by: FAMILY MEDICINE

## 2022-09-16 PROCEDURE — 90694 VACC AIIV4 NO PRSRV 0.5ML IM: CPT | Performed by: FAMILY MEDICINE

## 2022-09-16 PROCEDURE — G8417 CALC BMI ABV UP PARAM F/U: HCPCS | Performed by: FAMILY MEDICINE

## 2022-09-16 PROCEDURE — G8432 DEP SCR NOT DOC, RNG: HCPCS | Performed by: FAMILY MEDICINE

## 2022-09-16 PROCEDURE — 1123F ACP DISCUSS/DSCN MKR DOCD: CPT | Performed by: FAMILY MEDICINE

## 2022-09-16 PROCEDURE — G8399 PT W/DXA RESULTS DOCUMENT: HCPCS | Performed by: FAMILY MEDICINE

## 2022-09-16 PROCEDURE — 3046F HEMOGLOBIN A1C LEVEL >9.0%: CPT | Performed by: FAMILY MEDICINE

## 2022-09-16 PROCEDURE — 2022F DILAT RTA XM EVC RTNOPTHY: CPT | Performed by: FAMILY MEDICINE

## 2022-09-16 PROCEDURE — G8427 DOCREV CUR MEDS BY ELIG CLIN: HCPCS | Performed by: FAMILY MEDICINE

## 2022-09-16 PROCEDURE — G9899 SCRN MAM PERF RSLTS DOC: HCPCS | Performed by: FAMILY MEDICINE

## 2022-09-16 RX ORDER — CYCLOBENZAPRINE HCL 5 MG
5 TABLET ORAL
Qty: 30 TABLET | Refills: 0 | Status: SHIPPED | OUTPATIENT
Start: 2022-09-16

## 2022-09-16 RX ORDER — NAPROXEN 250 MG/1
250 TABLET ORAL
Qty: 30 TABLET | Refills: 1 | Status: SHIPPED | OUTPATIENT
Start: 2022-09-16

## 2022-09-16 NOTE — PROGRESS NOTES
Chief Complaint   Patient presents with    Physical     1. \"Have you been to the ER, urgent care clinic since your last visit? Hospitalized since your last visit? \" No    2. \"Have you seen or consulted any other health care providers outside of the [address] [place] [address] since your last visit? \" No     3. For patients aged 39-70: Has the patient had a colonoscopy / FIT/ Cologuard? No      If the patient is female:    4. For patients aged 41-77: Has the patient had a mammogram within the past 2 years? Yes - no Care Gap present      5. For patients aged 21-65: Has the patient had a pap smear?  NA - based on age or sex    Health Maintenance Due   Topic Date Due    DTaP/Tdap/Td series (1 - Tdap) Never done    Shingrix Vaccine Age 49> (1 of 2) Never done    Pneumococcal 65+ years (2 - PPSV23 or PCV20) 06/25/2020    Eye Exam Retinal or Dilated  06/25/2021    COVID-19 Vaccine (3 - Booster for Moderna series) 10/06/2021    MICROALBUMIN Q1  05/10/2022    A1C test (Diabetic or Prediabetic)  06/27/2022    Colorectal Cancer Screening Combo  07/31/2022    Flu Vaccine (1) 09/01/2022

## 2022-09-16 NOTE — PROGRESS NOTES
CC: follow-up DM and HTN    HPI: Pt is a 75 y.o. female who presents for follow-up DM and HTN. She is doing well other than some pain in her neck. Has been going on for the past few months and associated with poor sleep and increased stress during that time. No known injuries to the area. Pain is in the upper shoulders and neck and she also has some in the lower back. Denies radiation of pain, saddle anesthesia and b/bl incontinence. HTN:  Checking BPs at home?: YES  Logs today?: NO  Range of BPs?: Doesn't remember exact numbers but thinks it has been good. Headaches?: NO  Blurry vision?: NO  Lower extremity edema?: NO  Smoking?: NO    DM:  Checking BG at home?: YES  Logs today?: NO  BG range: Checked it one time and it was 167 after she had eaten a sugary snack the night before. Otherwise it has been 's with one value of 67.    Insulin dependent?: NO  Other medications for DM?: None, diet-controlled  Symptoms of hypoglycemia?: NO although did have one low BG  Aspirin?: NO - age  ACEi/ARB?: YES  Statin?: NO  Last eye exam?: Overdue  Last foot exam?: 11/2021  Last A1c:   Lab Results   Component Value Date/Time    Hemoglobin A1c 6.5 (H) 12/27/2021 11:45 AM     LastLDL:   Lab Results   Component Value Date/Time    LDL, calculated 92.8 12/27/2021 11:45 AM     Last microalbumin:   Lab Results   Component Value Date/Time    Microalbumin/Creat ratio (mg/g creat) 7 05/10/2021 10:35 AM    Microalbumin,urine random 1.20 05/10/2021 10:35 AM         Past Medical History:   Diagnosis Date    Asthma     Breast cancer ([facility]     Diabetes ([facility]     Hx of seasonal allergies     Hypertension        Family History   Problem Relation Age of Onset    Hypertension Mother     Hypertension Father     Dementia Father     Stroke Father     Diabetes Sister        Social History     Tobacco Use    Smoking status: Former     Packs/day: 0.10     Years: 0.50     Pack years: 0.05     Types: Cigarettes     Quit date: 9/1/1974     Years since quittin.0    Smokeless tobacco: Never   Substance Use Topics    Alcohol use: Yes     Comment: infrequent    Drug use: No       ROS:  Per HPI    PE:  Visit Vitals  BP (!) 146/87 (BP 1 Location: Left upper arm, BP Patient Position: Sitting, BP Cuff Size: Adult)   Pulse 62   Temp 98.6 °F (37 °C) (Oral)   Resp 18   Ht 5' 4\" (1.626 m)   Wt 196 lb (88.9 kg)   LMP 1975   SpO2 96%   BMI 33.64 kg/m²     Gen: Pt sitting in chair, in NAD  Head: Normocephalic, atraumatic  Eyes: Sclera anicteric, EOM grossly intact, PERRL  Nose: Normal nasal mucosa  Throat: MMM, normal lips, tongue and gums  Neck: Supple, no LAD, no thyromegaly or carotid bruits  CVS: Normal S1, S2, no m/r/g  Resp: CTAB, no wheezes or rales  Extrem: Atraumatic, no cyanosis or edema  MSK:   Neck: Normal AROM. No erythema or lesions. Mild TTP over lower cervical spine and trapezius muscles b/l. Traps feel tight b/l. Lower back: Normal gait. Mild TTP over lower lumbar spine and lumbar paraspinal muscles. Feet: Skin intact, no lesions. DP pulses 2+ b/l. Sensation intact to light touch and monofilament throughout. Pulses: 2+   Skin: Warm, dry  Neuro: Alert, oriented, appropriate      A/P:   Encounter Diagnoses     ICD-10-CM ICD-9-CM   1. Controlled type 2 diabetes mellitus with complication, without long-term current use of insulin ([facility])  E11.8 250.90   2. Osteoarthritis of both knees, unspecified osteoarthritis type  M17.0 715.96   3. Encounter for immunization  Z23 V03.89   4. Acute bilateral low back pain without sciatica  M54.50 724.2     338.19   5. Primary hypertension  I10 401.9     1. Osteoarthritis of both knees, unspecified osteoarthritis type: Pt requesting refill of naproxen and referral back to Ortho.   - naproxen (NAPROSYN) 250 mg tablet; Take 1 Tablet by mouth two (2) times daily as needed for Pain. Take with meals. Dispense: 30 Tablet;  Refill: 1  - REFERRAL TO ORTHOPEDIC SURGERY    2. Encounter for immunization  - INFLUENZA, FLUAD, (AGE 65 Y+), IM, PF, 0.5 ML  - ADMIN INFLUENZA VIRUS VAC    3. Acute bilateral low back pain without sciatica: Likely related to stress. Handout given with exercises and can continue naproxen with sparing use of flexeril. Pt to let us know if pain not improving or starts to worsen. - cyclobenzaprine (FLEXERIL) 5 mg tablet; Take 1 Tablet by mouth three (3) times daily as needed for Muscle Spasm(s). Dispense: 30 Tablet; Refill: 0    4. Primary hypertension: BP elevated slightly and on recheck. Home numbers good. Pt to bring log and her BP cuff to next appt to check against ours. For now continue current medications  - LIPID PANEL; Future  - METABOLIC PANEL, COMPREHENSIVE; Future    5. Controlled type 2 diabetes mellitus with complication, without long-term current use of insulin (HCC)  - TSH 3RD GENERATION; Future  - HEMOGLOBIN A1C WITH EAG; Future  - MICROALBUMIN, UR, RAND W/ MICROALB/CREAT RATIO; Future       RTC in 6 months for f/u chronic conditions, or sooner prn      Discussed diagnoses in detail with patient. Medication risks/benefits/side effects discussed with patient. All of the patient's questions were addressed. The patient understands and agrees with our plan of care. The patient knows to call back if they are unsure of or forget any changes we discussed today or if the symptoms change. The patient received an After-Visit Summary which contains VS, orders, medication list and allergy list. This can be used as a \"mini-medical record\" should they have to seek medical care while out of town. Current Outpatient Medications on File Prior to Visit   Medication Sig Dispense Refill    lisinopril-hydroCHLOROthiazide (PRINZIDE, ZESTORETIC) 20-25 mg per tablet Take 1 tablet by mouth once daily 90 Tablet 0    fluticasone propionate (FLONASE) 50 mcg/actuation nasal spray 2 Sprays by Both Nostrils route daily as needed.       albuterol (PROVENTIL VENTOLIN) 2.5 mg /3 mL (0.083 %) nebu 3 mL by Nebulization route every four (4) hours as needed for Wheezing. 1 Each 3    cholecalciferol (VITAMIN D3) (1000 Units /25 mcg) tablet Take 1,000 Units by mouth daily. loratadine (CLARITIN) 10 mg tablet Take 10 mg by mouth daily. albuterol (ProAir HFA) 90 mcg/actuation inhaler Take 1 puff every 4 hours as needed for shortness of breath or persistent nighttime cough. 1 Inhaler 6    cyanocobalamin (VITAMIN B12) 500 mcg tablet Take 1 Tab by mouth daily. gummie 90 Tab 1    multivit with min-folic acid 920 mcg chew Take  by mouth. Nebulizer & Compressor machine by Does Not Apply route. q4 hours with medication 1 Each 0    Lactobacillus acidophilus (PROBIOTIC PO) Take  by mouth as needed. (Patient not taking: Reported on 9/16/2022)       No current facility-administered medications on file prior to visit.

## 2022-09-17 LAB
ALBUMIN SERPL-MCNC: 4 G/DL (ref 3.5–5)
ALBUMIN/GLOB SERPL: 1.2 {RATIO} (ref 1.1–2.2)
ALP SERPL-CCNC: 77 U/L (ref 45–117)
ALT SERPL-CCNC: 37 U/L (ref 12–78)
ANION GAP SERPL CALC-SCNC: 5 MMOL/L (ref 5–15)
AST SERPL-CCNC: 31 U/L (ref 15–37)
BILIRUB SERPL-MCNC: 0.4 MG/DL (ref 0.2–1)
BUN SERPL-MCNC: 19 MG/DL (ref 6–20)
BUN/CREAT SERPL: 21 (ref 12–20)
CALCIUM SERPL-MCNC: 9.5 MG/DL (ref 8.5–10.1)
CHLORIDE SERPL-SCNC: 105 MMOL/L (ref 97–108)
CHOLEST SERPL-MCNC: 184 MG/DL
CO2 SERPL-SCNC: 30 MMOL/L (ref 21–32)
CREAT SERPL-MCNC: 0.91 MG/DL (ref 0.55–1.02)
CREAT UR-MCNC: 95.7 MG/DL
EST. AVERAGE GLUCOSE BLD GHB EST-MCNC: 143 MG/DL
GLOBULIN SER CALC-MCNC: 3.4 G/DL (ref 2–4)
GLUCOSE SERPL-MCNC: 81 MG/DL (ref 65–100)
HBA1C MFR BLD: 6.6 % (ref 4–5.6)
HDLC SERPL-MCNC: 72 MG/DL
HDLC SERPL: 2.6 {RATIO} (ref 0–5)
LDLC SERPL CALC-MCNC: 93.2 MG/DL (ref 0–100)
MICROALBUMIN UR-MCNC: 1 MG/DL
MICROALBUMIN/CREAT UR-RTO: 10 MG/G (ref 0–30)
POTASSIUM SERPL-SCNC: 3.9 MMOL/L (ref 3.5–5.1)
PROT SERPL-MCNC: 7.4 G/DL (ref 6.4–8.2)
SODIUM SERPL-SCNC: 140 MMOL/L (ref 136–145)
TRIGL SERPL-MCNC: 94 MG/DL (ref ?–150)
TSH SERPL DL<=0.05 MIU/L-ACNC: 1.54 UIU/ML (ref 0.36–3.74)
VLDLC SERPL CALC-MCNC: 18.8 MG/DL

## 2022-10-21 DIAGNOSIS — I10 ESSENTIAL HYPERTENSION: ICD-10-CM

## 2022-10-21 RX ORDER — LISINOPRIL AND HYDROCHLOROTHIAZIDE 20; 25 MG/1; MG/1
TABLET ORAL
Qty: 90 TABLET | Refills: 1 | Status: SHIPPED | OUTPATIENT
Start: 2022-10-21

## 2022-12-09 ENCOUNTER — TELEPHONE (OUTPATIENT)
Dept: PHARMACY | Age: 67
End: 2022-12-09

## 2022-12-09 NOTE — TELEPHONE ENCOUNTER
[facility] CLINICAL PHARMACY: ADHERENCE REVIEW  Identified care gap per [employer]: fills at [facility]: ACE/ARB adherence    Last Visit: 2022    ASSESSMENT  ACE/ARB ADHERENCE    Insurance Records claims through 2022 ( [place] = na%;  [place] =  80%; Potential Fail Date: 15 ):   Lisinopril/hctz last filled on 7/15/2022 for 90 day supply. Next refill due: 10/13/2022-PAST DUE 57 days     0 refills remaining. Billed through [employer]-  New rx sent 10/21/2022    BP Readings from Last 3 Encounters:   22 (!) 146/87   22 (!) 142/87   21 (!) 175/81     Estimated Creatinine Clearance: 63.9 mL/min (by C-G formula based on SCr of 0.91 mg/dL). PLAN  The following are interventions that have been identified:    - Patient overdue refilling Lisinopril/hctz and active on home medication list  -[facility] will fill today   [facility] message sent to patient        No future appointments.     Sacha Amato, PharmD, [id number]    Department, toll free: [phone] Option 2  ================================================================================    For Pharmacy Admin Tracking Only    CPA in place: No  Recommendation Provided To: Pharmacy: 1  Intervention Detail: Adherence Monitorin  Gap Closed?: No  Intervention Accepted By: Pharmacy: 1  Time Spent (min): 15

## 2023-01-05 NOTE — PROGRESS NOTES
Left generic message for upcoming procedure- time of arrival 0900/responsible  that can stay during and take home from procedure/current list of medications/CB number.

## 2023-01-06 ENCOUNTER — HOSPITAL ENCOUNTER (OUTPATIENT)
Age: 68
Setting detail: OUTPATIENT SURGERY
Discharge: HOME | End: 2023-01-06
Attending: INTERNAL MEDICINE | Admitting: INTERNAL MEDICINE
Payer: MEDICARE

## 2023-01-06 ENCOUNTER — ANESTHESIA (OUTPATIENT)
Dept: ENDOSCOPY | Age: 68
End: 2023-01-06
Payer: MEDICARE

## 2023-01-06 ENCOUNTER — ANESTHESIA EVENT (OUTPATIENT)
Dept: ENDOSCOPY | Age: 68
End: 2023-01-06
Payer: MEDICARE

## 2023-01-06 VITALS
RESPIRATION RATE: 15 BRPM | HEIGHT: 64 IN | OXYGEN SATURATION: 100 % | TEMPERATURE: 98.7 F | HEART RATE: 74 BPM | SYSTOLIC BLOOD PRESSURE: 147 MMHG | DIASTOLIC BLOOD PRESSURE: 82 MMHG | BODY MASS INDEX: 33.57 KG/M2 | WEIGHT: 196.65 LBS

## 2023-01-06 PROCEDURE — 76060000031 HC ANESTHESIA FIRST 0.5 HR: Performed by: INTERNAL MEDICINE

## 2023-01-06 PROCEDURE — 2709999900 HC NON-CHARGEABLE SUPPLY: Performed by: INTERNAL MEDICINE

## 2023-01-06 PROCEDURE — 74011250636 HC RX REV CODE- 250/636: Performed by: NURSE ANESTHETIST, CERTIFIED REGISTERED

## 2023-01-06 PROCEDURE — 76040000019: Performed by: INTERNAL MEDICINE

## 2023-01-06 PROCEDURE — 74011000250 HC RX REV CODE- 250: Performed by: NURSE ANESTHETIST, CERTIFIED REGISTERED

## 2023-01-06 RX ORDER — DEXTROMETHORPHAN/PSEUDOEPHED 2.5-7.5/.8
1.2 DROPS ORAL
Status: DISCONTINUED | OUTPATIENT
Start: 2023-01-06 | End: 2023-01-06 | Stop reason: HOSPADM

## 2023-01-06 RX ORDER — PROPOFOL 10 MG/ML
INJECTION, EMULSION INTRAVENOUS
Status: DISCONTINUED | OUTPATIENT
Start: 2023-01-06 | End: 2023-01-06 | Stop reason: HOSPADM

## 2023-01-06 RX ORDER — EPINEPHRINE 0.1 MG/ML
1 INJECTION INTRACARDIAC; INTRAVENOUS
Status: DISCONTINUED | OUTPATIENT
Start: 2023-01-06 | End: 2023-01-06 | Stop reason: HOSPADM

## 2023-01-06 RX ORDER — SODIUM CHLORIDE 9 MG/ML
50 INJECTION, SOLUTION INTRAVENOUS CONTINUOUS
Status: DISCONTINUED | OUTPATIENT
Start: 2023-01-06 | End: 2023-01-06 | Stop reason: HOSPADM

## 2023-01-06 RX ORDER — PROPOFOL 10 MG/ML
INJECTION, EMULSION INTRAVENOUS AS NEEDED
Status: DISCONTINUED | OUTPATIENT
Start: 2023-01-06 | End: 2023-01-06 | Stop reason: HOSPADM

## 2023-01-06 RX ORDER — ATROPINE SULFATE 0.1 MG/ML
0.4 INJECTION INTRAVENOUS
Status: DISCONTINUED | OUTPATIENT
Start: 2023-01-06 | End: 2023-01-06 | Stop reason: HOSPADM

## 2023-01-06 RX ORDER — FLUMAZENIL 0.1 MG/ML
0.2 INJECTION INTRAVENOUS
Status: DISCONTINUED | OUTPATIENT
Start: 2023-01-06 | End: 2023-01-06 | Stop reason: HOSPADM

## 2023-01-06 RX ORDER — MIDAZOLAM HYDROCHLORIDE 1 MG/ML
.25-5 INJECTION, SOLUTION INTRAMUSCULAR; INTRAVENOUS
Status: DISCONTINUED | OUTPATIENT
Start: 2023-01-06 | End: 2023-01-06 | Stop reason: HOSPADM

## 2023-01-06 RX ORDER — LIDOCAINE HYDROCHLORIDE 20 MG/ML
INJECTION, SOLUTION EPIDURAL; INFILTRATION; INTRACAUDAL; PERINEURAL AS NEEDED
Status: DISCONTINUED | OUTPATIENT
Start: 2023-01-06 | End: 2023-01-06 | Stop reason: HOSPADM

## 2023-01-06 RX ORDER — SODIUM CHLORIDE 9 MG/ML
INJECTION, SOLUTION INTRAVENOUS
Status: DISCONTINUED | OUTPATIENT
Start: 2023-01-06 | End: 2023-01-06 | Stop reason: HOSPADM

## 2023-01-06 RX ORDER — NALOXONE HYDROCHLORIDE 0.4 MG/ML
0.4 INJECTION, SOLUTION INTRAMUSCULAR; INTRAVENOUS; SUBCUTANEOUS
Status: DISCONTINUED | OUTPATIENT
Start: 2023-01-06 | End: 2023-01-06 | Stop reason: HOSPADM

## 2023-01-06 RX ADMIN — PROPOFOL 140 MCG/KG/MIN: 10 INJECTION, EMULSION INTRAVENOUS at 11:29

## 2023-01-06 RX ADMIN — PROPOFOL 50 MG: 10 INJECTION, EMULSION INTRAVENOUS at 11:29

## 2023-01-06 RX ADMIN — PROPOFOL 20 MG: 10 INJECTION, EMULSION INTRAVENOUS at 11:33

## 2023-01-06 RX ADMIN — SODIUM CHLORIDE: 9 INJECTION, SOLUTION INTRAVENOUS at 11:00

## 2023-01-06 RX ADMIN — LIDOCAINE HYDROCHLORIDE 60 MG: 20 INJECTION, SOLUTION EPIDURAL; INFILTRATION; INTRACAUDAL; PERINEURAL at 11:29

## 2023-01-06 RX ADMIN — PROPOFOL 30 MG: 10 INJECTION, EMULSION INTRAVENOUS at 11:31

## 2023-01-06 NOTE — PROGRESS NOTES
Endoscopy discharge instructions have been reviewed and given to patient. The patient verbalized understanding and acceptance of instructions. Dr. Vida Almeida discussed with spouse procedure findings and next steps.

## 2023-01-06 NOTE — ANESTHESIA PREPROCEDURE EVALUATION
Relevant Problems   No relevant active problems       Anesthetic History               Review of Systems / Medical History  Patient summary reviewed, nursing notes reviewed and pertinent labs reviewed    Pulmonary            Asthma        Neuro/Psych              Cardiovascular    Hypertension              Exercise tolerance: >4 METS  Comments: Denied recent cv/pulm complaints or changes. GI/Hepatic/Renal               Comments: Denied active reflux symptoms Endo/Other    Diabetes    Obesity     Other Findings              Physical Exam    Airway  Mallampati: I  TM Distance: > 6 cm  Neck ROM: normal range of motion   Mouth opening: Normal     Cardiovascular  Regular rate and rhythm,  S1 and S2 normal,  no murmur, click, rub, or gallop  Rhythm: regular  Rate: normal         Dental      Comments: No loose teeth.    Pulmonary  Breath sounds clear to auscultation               Abdominal  Abdominal exam normal       Other Findings            Anesthetic Plan    ASA: 2  Anesthesia type: MAC          Induction: Intravenous  Anesthetic plan and risks discussed with: Patient and Family

## 2023-01-06 NOTE — PERIOP NOTES
Endoscope was pre-cleaned at bedside immediately following procedure by BALDO AT [facility].

## 2023-01-06 NOTE — PROGRESS NOTES
April D Aung  1955  [id number]    Situation:  Verbal report received from:   Louis Acevedo RN   Procedure: Procedure(s):  COLONOSCOPY    Background:    Preoperative diagnosis: Family history of colon cancer [Z80.0]  Postoperative diagnosis: Colon-hemorrhiods    :  Dr. Tod Valdovinos   Assistant(s): Endoscopy Technician-1: Radha Walton  Endoscopy RN-1: Leann Isabel RN    Specimens: * No specimens in log *  H. Pylori  no    Assessment:  Intra-procedure medications   Anesthesia gave intra-procedure sedation and medications, see anesthesia flow sheet yes    Intravenous fluids: NS@ KVO     Vital signs stable   yes    Abdominal assessment: round and soft   yes    Recommendation:  Discharge patient per MD order  yes.   Return to floor  outpatient  Family or Friend   spouse  Permission to share finding with family or friend yes

## 2023-01-06 NOTE — PERIOP NOTES
Patient resting comfortably on stretcher HOB up VSS call bell within reach pt and family informed of procedure delay awaiting MD for procedure will continue to monitor pt.

## 2023-01-06 NOTE — DISCHARGE INSTRUCTIONS
[address] [facility]. Tiara Bearden M.D.  [phone]            COLON DISCHARGE INSTRUCTIONS       2023 Aung  :  1955  [place] Medical Record Number:  [id number]      COLONOSCOPY FINDINGS:  Your colonoscopy showed small internal hemorrhoids, otherwise mucosa within normal throughout the colon. DISCOMFORT:  Redness at IV site- apply warm compress to area; if redness or soreness persist- contact your physician  There may be a slight amount of blood passed from the rectum  Gaseous discomfort- walking, belching will help relieve any discomfort  You may not operate a vehicle for 12 hours  You may not engage in an occupation involving machinery or appliances for rest of today  You may not drink alcoholic beverages for at least 12 hours  Avoid making any critical decisions for at least 24 hour  DIET:   High fiber diet. - however -  remember your colon is empty and a heavy meal will produce gas. Avoid these foods:  vegetables, fried / greasy foods, carbonated drinks for today     ACTIVITY:  You may resume your normal daily activities it is recommended that you spend the remainder of the day resting -  avoid any strenuous activity. CALL M.D. ANY SIGN OF:   Increasing pain, nausea, vomiting  Abdominal distension (swelling)  New increased bleeding (oral or rectal)  Fever (chills)  Pain in chest area  Bloody discharge from nose or mouth   Shortness of breath    Follow-up Instructions:   Call Dr. Ismael Miller if any questions or problems. Telephone # [phone]    Repeat colonoscopy in 10 years will be based on health status and if willing to undergo colonoscopy.

## 2023-01-06 NOTE — PROCEDURES
Leila Medel M.D.  [phone]            2023          Colonoscopy Operative Report   Claude Bob  :  1955  [place] Medical Record Number:  [id number]      Indications:    Screening colonoscopy     :  Mai Lopez MD    Referring Provider: Evelina Olmedo MD    Sedation:  MAC anesthesia    Pre-Procedural Exam:      Airway: clear,  No airway problems anticipated  Heart: RRR, without gallops or rubs  Lungs: clear bilaterally without wheezes, crackles, or rhonchi  Abdomen: soft, nontender, nondistended, bowel sounds present  Mental Status: awake, alert and oriented to person, place and time     Procedure Details:  After informed consent was obtained with all risks and benefits of procedure explained and preoperative exam completed, the patient was taken to the endoscopy suite and placed in the left lateral decubitus position. Upon sequential sedation as per above, a digital rectal exam was performed. The Olympus videocolonoscope  was inserted in the rectum and carefully advanced to the cecum, which was identified by the ileocecal valve and appendiceal orifice. The quality of preparation was good. The colonoscope was slowly withdrawn with careful inspection and evaluation between folds. Retroflexion in the rectum was performed. Findings:   Terminal Ileum: not intubated  Cecum: normal  Ascending Colon: normal  Transverse Colon: normal  Descending Colon: normal  Sigmoid: normal  Rectum: no mucosal lesion appreciated  Grade 1 internal hemorrhoid(s); Interventions:  none    Specimen Removed:  none    Complications: None. EBL:  None. Impression:  Mucosa within normal throughout the colon                        Small internal hemorrhoids    Recommendations:  -Repeat colonoscopy in 10 years   -High fiber diet.    -Resume normal medication(s). Discharge Disposition:  Home in the company of a  when able to ambulate.     Mai Lopez MD  2023 11:39 AM

## 2023-01-06 NOTE — ANESTHESIA POSTPROCEDURE EVALUATION
Procedure(s):  COLONOSCOPY. MAC    Anesthesia Post Evaluation      Multimodal analgesia: multimodal analgesia used between 6 hours prior to anesthesia start to PACU discharge  Patient location during evaluation: bedside  Patient participation: complete - patient participated  Level of consciousness: awake and sleepy but conscious  Pain score: 0  Pain management: adequate  Airway patency: patent  Anesthetic complications: no  Cardiovascular status: acceptable  Respiratory status: acceptable  Hydration status: acceptable  Comments: Immediate cv/pulm status within acceptable preop limits. Post anesthesia nausea and vomiting:  controlled  Final Post Anesthesia Temperature Assessment:  Normothermia (36.0-37.5 degrees C)      INITIAL Post-op Vital signs:   Vitals Value Taken Time   /70 01/06/23 1144   Temp     Pulse 88 01/06/23 1147   Resp 22 01/06/23 1147   SpO2 97 % 01/06/23 1147   Vitals shown include unvalidated device data.

## 2023-03-03 LAB — HBA1C MFR BLD HPLC: 6.7 %

## 2023-03-14 ENCOUNTER — TELEPHONE (OUTPATIENT)
Dept: FAMILY MEDICINE CLINIC | Age: 68
End: 2023-03-14

## 2023-03-14 NOTE — TELEPHONE ENCOUNTER
Call to pt, no answer, mess left. Pt is due for a medicare wellness. Please schedule with Dr. Haley Ruff between May 8-19th, if possible.

## 2023-04-27 ENCOUNTER — OFFICE VISIT (OUTPATIENT)
Dept: FAMILY MEDICINE CLINIC | Age: 68
End: 2023-04-27
Payer: MEDICARE

## 2023-04-27 VITALS
HEIGHT: 64 IN | OXYGEN SATURATION: 97 % | BODY MASS INDEX: 34.15 KG/M2 | HEART RATE: 54 BPM | TEMPERATURE: 98.5 F | SYSTOLIC BLOOD PRESSURE: 147 MMHG | RESPIRATION RATE: 20 BRPM | DIASTOLIC BLOOD PRESSURE: 79 MMHG | WEIGHT: 200 LBS

## 2023-04-27 DIAGNOSIS — I10 ESSENTIAL HYPERTENSION: ICD-10-CM

## 2023-04-27 DIAGNOSIS — E11.8 CONTROLLED TYPE 2 DIABETES MELLITUS WITH COMPLICATION, WITHOUT LONG-TERM CURRENT USE OF INSULIN (HCC): ICD-10-CM

## 2023-04-27 DIAGNOSIS — H10.13 ALLERGIC CONJUNCTIVITIS OF BOTH EYES: ICD-10-CM

## 2023-04-27 DIAGNOSIS — Z00.00 MEDICARE ANNUAL WELLNESS VISIT, SUBSEQUENT: Primary | ICD-10-CM

## 2023-04-27 PROCEDURE — 1090F PRES/ABSN URINE INCON ASSESS: CPT | Performed by: FAMILY MEDICINE

## 2023-04-27 PROCEDURE — 3078F DIAST BP <80 MM HG: CPT | Performed by: FAMILY MEDICINE

## 2023-04-27 PROCEDURE — 2022F DILAT RTA XM EVC RTNOPTHY: CPT | Performed by: FAMILY MEDICINE

## 2023-04-27 PROCEDURE — G8417 CALC BMI ABV UP PARAM F/U: HCPCS | Performed by: FAMILY MEDICINE

## 2023-04-27 PROCEDURE — 3044F HG A1C LEVEL LT 7.0%: CPT | Performed by: FAMILY MEDICINE

## 2023-04-27 PROCEDURE — G8399 PT W/DXA RESULTS DOCUMENT: HCPCS | Performed by: FAMILY MEDICINE

## 2023-04-27 PROCEDURE — 1123F ACP DISCUSS/DSCN MKR DOCD: CPT | Performed by: FAMILY MEDICINE

## 2023-04-27 PROCEDURE — G8432 DEP SCR NOT DOC, RNG: HCPCS | Performed by: FAMILY MEDICINE

## 2023-04-27 PROCEDURE — G8536 NO DOC ELDER MAL SCRN: HCPCS | Performed by: FAMILY MEDICINE

## 2023-04-27 PROCEDURE — 99214 OFFICE O/P EST MOD 30 MIN: CPT | Performed by: FAMILY MEDICINE

## 2023-04-27 PROCEDURE — 1101F PT FALLS ASSESS-DOCD LE1/YR: CPT | Performed by: FAMILY MEDICINE

## 2023-04-27 PROCEDURE — G8427 DOCREV CUR MEDS BY ELIG CLIN: HCPCS | Performed by: FAMILY MEDICINE

## 2023-04-27 PROCEDURE — 3077F SYST BP >= 140 MM HG: CPT | Performed by: FAMILY MEDICINE

## 2023-04-27 PROCEDURE — G9899 SCRN MAM PERF RSLTS DOC: HCPCS | Performed by: FAMILY MEDICINE

## 2023-04-27 PROCEDURE — G0439 PPPS, SUBSEQ VISIT: HCPCS | Performed by: FAMILY MEDICINE

## 2023-04-27 PROCEDURE — 3017F COLORECTAL CA SCREEN DOC REV: CPT | Performed by: FAMILY MEDICINE

## 2023-04-27 RX ORDER — OLOPATADINE HYDROCHLORIDE 2 MG/ML
1 SOLUTION/ DROPS OPHTHALMIC DAILY
Qty: 5 ML | Refills: 2 | Status: SHIPPED | OUTPATIENT
Start: 2023-04-27

## 2023-04-27 NOTE — PROGRESS NOTES
CC: follow-up HTN, DM and HLD    HPI: Pt is a 70 y.o. female who presents for follow-up HTN, DM and HLD. She has not been checking BP at home. She is following a diabetic diet and recently had an A1c done at home with her insurance company that was 6.7. She is having some itchy eyes that she gets every year around this time and attributes to allergies. Past Medical History:   Diagnosis Date    Asthma     Breast cancer ([facility]     Diabetes ([facility]     Hx of seasonal allergies     Hypertension        Family History   Problem Relation Age of Onset    Hypertension Mother     Hypertension Father     Dementia Father     Stroke Father     Diabetes Sister        Social History     Tobacco Use    Smoking status: Former     Packs/day: 0.10     Years: 0.50     Pack years: 0.05     Types: Cigarettes     Quit date: 1974     Years since quittin.6    Smokeless tobacco: Never   Substance Use Topics    Alcohol use: Yes     Comment: infrequent    Drug use: No       ROS:  Per HPI    PE:  Visit Vitals  BP (!) 167/82   Pulse (!) 54   Temp 98.5 °F (36.9 °C)   Resp 20   Ht 5' 4\" (1.626 m)   Wt 200 lb (90.7 kg)   LMP 1975   SpO2 97%   BMI 34.33 kg/m²     Gen: Pt sitting in chair, in NAD  Head: Normocephalic, atraumatic  Eyes: Sclera anicteric, EOM grossly intact, PERRL  Nose: Normal nasal mucosa  Throat: MMM, normal lips, tongue and gums  Neck: Supple  CVS: Normal S1, S2, no m/r/g  Resp: CTAB, no wheezes or rales  Extrem: Atraumatic, no cyanosis  Pulses: 2+   Skin: Warm, dry  Neuro: Alert, oriented, appropriate      A/P:   Encounter Diagnoses     ICD-10-CM ICD-9-CM   1. Medicare annual wellness visit, subsequent  Z00.00 V70.0   2. Essential hypertension  I10 401.9   3. Allergic conjunctivitis of both eyes  H10.13 372.14   4. Controlled type 2 diabetes mellitus with complication, without long-term current use of insulin (HCC)  E11.8 250.90     1.  Essential hypertension: BP initially elevated after long walk down [place]berto but improved on recheck. Continue current medications and pt to check BP's at home and call if elevated. - METABOLIC PANEL, BASIC; Future    2. Medicare annual wellness visit, subsequent: See separate note from today    3. Allergic conjunctivitis of both eyes  - olopatadine (Pataday Once Daily Relief) 0.2 % drop ophthalmic solution; Administer 1 Drop to both eyes daily. Dispense: 5 mL; Refill: 2    4. Controlled type 2 diabetes mellitus with complication, without long-term current use of insulin ([facility]): Pt to come by lab in a few weeks to have these drawn. Continue diet-control.   - HEMOGLOBIN A1C WITH EAG; Future  - LIPID PANEL; Future       RTC in 6 months for f/u chronic conditions, or sooner prn    Discussed diagnoses in detail with patient. Medication risks/benefits/side effects discussed with patient. All of the patient's questions were addressed. The patient understands and agrees with our plan of care. The patient knows to call back if they are unsure of or forget any changes we discussed today or if the symptoms change. The patient received an After-Visit Summary which contains VS, orders, medication list and allergy list. This can be used as a \"mini-medical record\" should they have to seek medical care while out of town. Current Outpatient Medications on File Prior to Visit   Medication Sig Dispense Refill    lisinopril-hydroCHLOROthiazide (PRINZIDE, ZESTORETIC) 20-25 mg per tablet Take 1 tablet by mouth once daily 90 Tablet 1    naproxen (NAPROSYN) 250 mg tablet Take 1 Tablet by mouth two (2) times daily as needed for Pain. Take with meals. 30 Tablet 1    cyclobenzaprine (FLEXERIL) 5 mg tablet Take 1 Tablet by mouth three (3) times daily as needed for Muscle Spasm(s). 30 Tablet 0    fluticasone propionate (FLONASE) 50 mcg/actuation nasal spray 2 Sprays by Both Nostrils route daily as needed.       albuterol (PROVENTIL VENTOLIN) 2.5 mg /3 mL (0.083 %) nebu 3 mL by Nebulization route every four (4) hours as needed for Wheezing. 1 Each 3    cholecalciferol (VITAMIN D3) (1000 Units /25 mcg) tablet Take 1 Tablet by mouth daily. loratadine (CLARITIN) 10 mg tablet Take 1 Tablet by mouth daily. albuterol (ProAir HFA) 90 mcg/actuation inhaler Take 1 puff every 4 hours as needed for shortness of breath or persistent nighttime cough. 1 Inhaler 6    cyanocobalamin (VITAMIN B12) 500 mcg tablet Take 1 Tablet by mouth daily. gummie 90 Tab 1    multivit with min-folic acid 597 mcg chew Take  by mouth. Nebulizer & Compressor machine by Does Not Apply route. q4 hours with medication 1 Each 0     No current facility-administered medications on file prior to visit.

## 2023-04-27 NOTE — PATIENT INSTRUCTIONS
Medicare Wellness Visit, Female     The best way to live healthy is to have a lifestyle where you eat a well-balanced diet, exercise regularly, limit alcohol use, and quit all forms of tobacco/nicotine, if applicable. Regular preventive services are another way to keep healthy. Preventive services (vaccines, screening tests, monitoring & exams) can help personalize your care plan, which helps you manage your own care. Screening tests can find health problems at the earliest stages, when they are easiest to treat. [facility] follows the current, evidence-based guidelines published by the [facility] ([facility]) when recommending preventive services for our patients. Because we follow these guidelines, sometimes recommendations change over time as research supports it. (For example, mammograms used to be recommended annually. Even though Medicare will still pay for an annual mammogram, the newer guidelines recommend a mammogram every two years for women of average risk). Of course, you and your doctor may decide to screen more often for some diseases, based on your risk and your co-morbidities (chronic disease you are already diagnosed with). Preventive services for you include:  - Medicare offers their members a free annual wellness visit, which is time for you and your primary care provider to discuss and plan for your preventive service needs.  Take advantage of this benefit every year!    -Over the age of 72 should receive the recommended pneumonia vaccines.    -All adults should have a flu vaccine yearly.  -All adults should have a tetanus vaccine every 10 years.   -Over the age 48 should receive the shingles vaccines.        -All adults should be screened once for Hepatitis C.  -All adults age 38-68 who are overweight should have a diabetes screening test once every three years.   -Other screening tests and preventive services for persons with diabetes include: an eye exam to screen for diabetic retinopathy, a kidney function test, a foot exam, and stricter control over your cholesterol.   -Cardiovascular screening for adults with routine risk involves an electrocardiogram (ECG) at intervals determined by your doctor.     -Colorectal cancer screenings should be done for adults age 39-70 with no increased risk factors for colorectal cancer. There are a number of acceptable methods of screening for this type of cancer. Each test has its own benefits and drawbacks. Discuss with your doctor what is most appropriate for you during your annual wellness visit. The different tests include: colonoscopy (considered the best screening method), a fecal occult blood test, a fecal DNA test, and sigmoidoscopy.    -Lung cancer screening is recommended annually with a low dose CT scan for adults between age 54 and 68, who have smoked at least 30 pack years (equivalent of 1 pack per day for 30 days), and who is a current smoker or quit less than 15 years ago.    -A bone mass density test is recommended when a woman turns 65 to screen for osteoporosis. This test is only recommended one time, as a screening. Some providers will use this same test as a disease monitoring tool if you already have osteoporosis. -Breast cancer screenings are recommended every other year for women of normal risk, age 54-69.    -Cervical cancer screenings for women over age 72 are only recommended with certain risk factors.      Here is a list of your current Health Maintenance items (your personalized list of preventive services) with a due date:  Health Maintenance Due   Topic Date Due    DTaP/Tdap/Td  (1 - Tdap) Never done    Shingles Vaccine (1 of 2) Never done    Pneumococcal Vaccine (2 - PPSV23 if available, else PCV20) 08/20/2019    Eye Exam  06/25/2021    COVID-19 Vaccine (3 - Booster for Randolph Carota series) 07/01/2021

## 2023-04-27 NOTE — PROGRESS NOTES
This is the Subsequent Medicare Annual Wellness Exam, performed 12 months or more after the Initial AWV or the last Subsequent AWV    I have reviewed the patient's medical history in detail and updated the computerized patient record. Assessment/Plan   Education and counseling provided:  Are appropriate based on today's review and evaluation  Pneumococcal Vaccine - recently done  Shingrix - recently done, TDaP - recently done  Eye exam - recently done, [facility]    1. Medicare annual wellness visit, subsequent  2. Essential hypertension       Depression Risk Factor Screening     3 most recent PHQ Screens 9/16/2022   Little interest or pleasure in doing things Not at all   Feeling down, depressed, irritable, or hopeless Nearly every day   Total Score PHQ 2 3   Trouble falling or staying asleep, or sleeping too much Nearly every day   Feeling tired or having little energy Several days   Poor appetite, weight loss, or overeating Not at all   Feeling bad about yourself - or that you are a failure or have let yourself or your family down Not at all   Trouble concentrating on things such as school, work, reading, or watching TV Several days   Moving or speaking so slowly that other people could have noticed; or the opposite being so fidgety that others notice Not at all   Thoughts of being better off dead, or hurting yourself in some way Not at all   PHQ 9 Score 8   How difficult have these problems made it for you to do your work, take care of your home and get along with others Not difficult at all       Alcohol & Drug Abuse Risk Screen    Do you average more than 1 drink per night or more than 7 drinks a week:  No    On any one occasion in the past three months have you have had more than 3 drinks containing alcohol:  No          Functional Ability and Level of Safety    Hearing: Hearing is good. Activities of Daily Living:   The home contains: handrails  Patient does total self care Ambulation: with no difficulty     Fall Risk:  Fall Risk Assessment, last 12 mths 4/27/2023   Able to walk? Yes   Fall in past 12 months? 0   Do you feel unsteady?  0   Are you worried about falling 0      Abuse Screen:  Patient is not abused       Cognitive Screening    Has your family/caregiver stated any concerns about your memory: no     Health Maintenance Due     Health Maintenance Due   Topic Date Due    DTaP/Tdap/Td series (1 - Tdap) Never done    Shingles Vaccine (1 of 2) Never done    Pneumococcal 65+ years (2 - PPSV23 if available, else PCV20) 08/20/2019    Eye Exam Retinal or Dilated  06/25/2021    COVID-19 Vaccine (3 - Booster for Nur Mirza series) 07/01/2021       Patient Care Team   Patient Care Team:  Shay Ludwig MD as PCP - General (Family Medicine)  Shay Ludwig MD as PCP - [facility] [place] [place] [facility]  Queenie Weldon MD (Orthopedic Surgery)    History     Patient Active Problem List   Diagnosis Code    Asthma J45.909    HTN (hypertension) I10    AR (allergic rhinitis) J30.9    Alopecia, unspecified L65.9    Mammogram abnormal R92.8    Microscopic hematuria R31.29    Abnormal CPK R74.8    Controlled type 2 diabetes mellitus with complication, without long-term current use of insulin (HCC) E11.8    Knee pain M25.569    Low back pain M54.50    Severe obesity ([address] E66.01     Past Medical History:   Diagnosis Date    Asthma     Breast cancer ([address]     Diabetes ([address]     Hx of seasonal allergies     Hypertension       Past Surgical History:   Procedure Laterality Date    BIOPSY BREAST      X3    COLONOSCOPY N/A 1/6/2023    COLONOSCOPY performed by [facility]as MD at [facility] [place] ENDOSCOPY    HX HYSTERECTOMY      HX TONSILLECTOMY      AK UNLISTED PROCEDURE BREAST      bilateral breast lumpectomy     Current Outpatient Medications   Medication Sig Dispense Refill    lisinopril-hydroCHLOROthiazide (PRINZIDE, ZESTORETIC) 20-25 mg per tablet Take 1 tablet by mouth once daily 90 Tablet 1    naproxen (NAPROSYN) 250 mg tablet Take 1 Tablet by mouth two (2) times daily as needed for Pain. Take with meals. 30 Tablet 1    cyclobenzaprine (FLEXERIL) 5 mg tablet Take 1 Tablet by mouth three (3) times daily as needed for Muscle Spasm(s). 30 Tablet 0    fluticasone propionate (FLONASE) 50 mcg/actuation nasal spray 2 Sprays by Both Nostrils route daily as needed. albuterol (PROVENTIL VENTOLIN) 2.5 mg /3 mL (0.083 %) nebu 3 mL by Nebulization route every four (4) hours as needed for Wheezing. 1 Each 3    cholecalciferol (VITAMIN D3) (1000 Units /25 mcg) tablet Take 1 Tablet by mouth daily. loratadine (CLARITIN) 10 mg tablet Take 1 Tablet by mouth daily. albuterol (ProAir HFA) 90 mcg/actuation inhaler Take 1 puff every 4 hours as needed for shortness of breath or persistent nighttime cough. 1 Inhaler 6    cyanocobalamin (VITAMIN B12) 500 mcg tablet Take 1 Tablet by mouth daily. gummie 90 Tab 1    multivit with min-folic acid 454 mcg chew Take  by mouth. Nebulizer & Compressor machine by Does Not Apply route. q4 hours with medication 1 Each 0     Allergies   Allergen Reactions    Oxycodone-Acetaminophen Other (comments)     felt like \"out of body experience\"  Other reaction(s):  Other (comments)  felt like \"out of body experience\"      Percodan [Oxycodone Hcl-Oxycodone-Asa] Other (comments)     felt like \"out of body experience\"       Family History   Problem Relation Age of Onset    Hypertension Mother     Hypertension Father     Dementia Father     Stroke Father     Diabetes Sister      Social History     Tobacco Use    Smoking status: Former     Packs/day: 0.10     Years: 0.50     Pack years: 0.05     Types: Cigarettes     Quit date: 1974     Years since quittin.6    Smokeless tobacco: Never   Substance Use Topics    Alcohol use: Yes     Comment: infrequent         Juan F Blake MD

## 2023-04-27 NOTE — PROGRESS NOTES
1. \"Have you been to the ER, urgent care clinic since your last visit? Hospitalized since your last visit? \" No    2. \"Have you seen or consulted any other health care providers outside of the [address] [place] [address] since your last visit? \" No     3. For patients aged 39-70: Has the patient had a colonoscopy / FIT/ Cologuard? Yes - no Care Gap present      If the patient is female:    4. For patients aged 41-77: Has the patient had a mammogram within the past 2 years? Yes - no Care Gap present      5. For patients aged 21-65: Has the patient had a pap smear?  NA - based on age or sex    Health Maintenance Due   Topic Date Due    DTaP/Tdap/Td series (1 - Tdap) Never done    Shingles Vaccine (1 of 2) Never done    Pneumococcal 65+ years (2 - PPSV23 if available, else PCV20) 08/20/2019    Eye Exam Retinal or Dilated  06/25/2021    COVID-19 Vaccine (3 - Booster for Moderna series) 07/01/2021    Medicare Yearly Exam  01/14/2023

## 2023-06-26 RX ORDER — LISINOPRIL AND HYDROCHLOROTHIAZIDE 25; 20 MG/1; MG/1
1 TABLET ORAL DAILY
Qty: 90 TABLET | Refills: 1 | Status: SHIPPED | OUTPATIENT
Start: 2023-06-26

## 2023-08-18 ENCOUNTER — OFFICE VISIT (OUTPATIENT)
Facility: CLINIC | Age: 68
End: 2023-08-18
Payer: MEDICARE

## 2023-08-18 VITALS
BODY MASS INDEX: 33.46 KG/M2 | DIASTOLIC BLOOD PRESSURE: 72 MMHG | WEIGHT: 196 LBS | OXYGEN SATURATION: 99 % | RESPIRATION RATE: 18 BRPM | HEIGHT: 64 IN | SYSTOLIC BLOOD PRESSURE: 122 MMHG | TEMPERATURE: 98.6 F | HEART RATE: 62 BPM

## 2023-08-18 DIAGNOSIS — J30.89 NON-SEASONAL ALLERGIC RHINITIS DUE TO OTHER ALLERGIC TRIGGER: ICD-10-CM

## 2023-08-18 DIAGNOSIS — J01.90 ACUTE VIRAL SINUSITIS: Primary | ICD-10-CM

## 2023-08-18 DIAGNOSIS — Z12.31 SCREENING MAMMOGRAM FOR BREAST CANCER: ICD-10-CM

## 2023-08-18 DIAGNOSIS — M62.838 TRAPEZIUS MUSCLE SPASM: ICD-10-CM

## 2023-08-18 DIAGNOSIS — B97.89 ACUTE VIRAL SINUSITIS: Primary | ICD-10-CM

## 2023-08-18 PROCEDURE — 1123F ACP DISCUSS/DSCN MKR DOCD: CPT

## 2023-08-18 PROCEDURE — 3074F SYST BP LT 130 MM HG: CPT

## 2023-08-18 PROCEDURE — 99214 OFFICE O/P EST MOD 30 MIN: CPT

## 2023-08-18 PROCEDURE — 3078F DIAST BP <80 MM HG: CPT

## 2023-08-18 RX ORDER — CYCLOBENZAPRINE HCL 5 MG
5 TABLET ORAL NIGHTLY PRN
Qty: 30 TABLET | Refills: 0 | Status: SHIPPED | OUTPATIENT
Start: 2023-08-18

## 2023-08-18 ASSESSMENT — ENCOUNTER SYMPTOMS
SINUS PRESSURE: 1
EYE ITCHING: 1
RHINORRHEA: 1
DIARRHEA: 0
SORE THROAT: 1
CONSTIPATION: 0
COUGH: 1
SHORTNESS OF BREATH: 1
SINUS PAIN: 1
TROUBLE SWALLOWING: 1

## 2023-08-18 NOTE — PROGRESS NOTES
Chief Complaint   Patient presents with    Sinus Problem     X1 month duration. Reports dark sputum. Sinus pressure and drainage. History of Present Illness:  April D Anai Wood is a 70 y.o. female who presents to clinic for sinus infection. Went and visited her mom in [place] last month and since then has had sinus pressure and drainage clear>yellow>dark>clear. Taking flonase and claritin. No fever/chills. Occasional cough. Occasional SOB, [place]. Sinus pressure is on both sides of the maxilla and in the temporal areas. Worse on the right side. Patient's allergy symptoms were worse while she was in [place]. Has been using her albuterol inhaler 2x/day for a week when she got back, now using it 1-2x/week. Past Medical History:   Diagnosis Date    Asthma     Breast cancer ([facility])     Diabetes ([facility])     Hx of seasonal allergies     Hypertension        Current Outpatient Medications   Medication Sig Dispense Refill    cyclobenzaprine (FLEXERIL) 5 MG tablet Take 1 tablet by mouth nightly as needed for Muscle spasms 30 tablet 0    lisinopril-hydroCHLOROthiazide (PRINZIDE;ZESTORETIC) 20-25 MG per tablet Take 1 tablet by mouth daily 90 tablet 1    albuterol sulfate HFA (PROVENTIL;VENTOLIN;PROAIR) 108 (90 Base) MCG/ACT inhaler Take 1 puff every 4 hours as needed for shortness of breath or persistent nighttime cough. albuterol (PROVENTIL) (2.5 MG/3ML) 0.083% nebulizer solution Inhale 3 mLs into the lungs every 4 hours as needed      vitamin D (CHOLECALCIFEROL) 25 MCG (1000 UT) TABS tablet Take 1 tablet by mouth daily      cyanocobalamin 500 MCG tablet Take 1 tablet by mouth daily      fluticasone (FLONASE) 50 MCG/ACT nasal spray 2 sprays by Nasal route daily as needed      loratadine (CLARITIN) 10 MG tablet Take 1 tablet by mouth daily      naproxen (NAPROSYN) 250 MG tablet Take 1 tablet by mouth 2 times daily as needed       No current facility-administered medications for this visit.        Allergies

## 2023-08-24 ENCOUNTER — PATIENT MESSAGE (OUTPATIENT)
Facility: CLINIC | Age: 68
End: 2023-08-24

## 2023-08-24 ENCOUNTER — TELEPHONE (OUTPATIENT)
Facility: CLINIC | Age: 68
End: 2023-08-24

## 2023-08-24 DIAGNOSIS — J01.90 ACUTE BACTERIAL RHINOSINUSITIS: Primary | ICD-10-CM

## 2023-08-24 DIAGNOSIS — B96.89 ACUTE BACTERIAL RHINOSINUSITIS: Primary | ICD-10-CM

## 2023-08-24 RX ORDER — AMOXICILLIN AND CLAVULANATE POTASSIUM 875; 125 MG/1; MG/1
1 TABLET, FILM COATED ORAL 2 TIMES DAILY
Qty: 14 TABLET | Refills: 0 | Status: SHIPPED | OUTPATIENT
Start: 2023-08-24 | End: 2023-08-31

## 2023-08-24 NOTE — TELEPHONE ENCOUNTER
Called and spoke with patient. Unclear if symptoms are recurrent or from a new infection, antibiotics will likely help or prevent infection from prolonged viral rhinosinusitis.

## 2023-08-24 NOTE — TELEPHONE ENCOUNTER
Dr Reji Fallon told Pt if she did not get better to call him. Pt states that her symptoms with her sinus in much worse.     Please call in CEFDINIR for her at the [facility]

## 2023-08-24 NOTE — TELEPHONE ENCOUNTER
Attempted to call patient, sent to . Will send over augmentin [phone] BID and ask patient to follow-up if symptoms continue or worsen.

## 2023-08-25 ENCOUNTER — TELEPHONE (OUTPATIENT)
Facility: CLINIC | Age: 68
End: 2023-08-25

## 2023-08-25 DIAGNOSIS — J01.90 ACUTE BACTERIAL RHINOSINUSITIS: Primary | ICD-10-CM

## 2023-08-25 DIAGNOSIS — B96.89 ACUTE BACTERIAL RHINOSINUSITIS: Primary | ICD-10-CM

## 2023-08-25 NOTE — TELEPHONE ENCOUNTER
Pt states the Augmentin Rx is too big for her to swallow. Pt has tried cutting the pill in half and it did not help. Pt states she is not going to take this Rx, and that she wants an Rx for Cefdirin sent to [facility] ([place], [place]). Please advise.

## 2023-08-28 RX ORDER — CEFDINIR 300 MG/1
300 CAPSULE ORAL 2 TIMES DAILY
Qty: 14 CAPSULE | Refills: 0 | Status: SHIPPED | OUTPATIENT
Start: 2023-08-28 | End: 2023-09-04

## 2023-09-29 RX ORDER — LISINOPRIL AND HYDROCHLOROTHIAZIDE 25; 20 MG/1; MG/1
1 TABLET ORAL DAILY
Qty: 100 TABLET | Refills: 0 | Status: SHIPPED | OUTPATIENT
Start: 2023-09-29 | End: 2023-10-31 | Stop reason: DRUGHIGH

## 2023-10-31 ENCOUNTER — OFFICE VISIT (OUTPATIENT)
Facility: CLINIC | Age: 68
End: 2023-10-31
Payer: MEDICARE

## 2023-10-31 VITALS
WEIGHT: 195.2 LBS | DIASTOLIC BLOOD PRESSURE: 84 MMHG | HEIGHT: 64 IN | HEART RATE: 73 BPM | TEMPERATURE: 97.8 F | RESPIRATION RATE: 14 BRPM | SYSTOLIC BLOOD PRESSURE: 158 MMHG | BODY MASS INDEX: 33.32 KG/M2 | OXYGEN SATURATION: 97 %

## 2023-10-31 DIAGNOSIS — Z23 ENCOUNTER FOR IMMUNIZATION: ICD-10-CM

## 2023-10-31 DIAGNOSIS — E11.8 TYPE 2 DIABETES MELLITUS WITH UNSPECIFIED COMPLICATIONS (HCC): Primary | ICD-10-CM

## 2023-10-31 DIAGNOSIS — B35.4 TINEA CORPORIS: ICD-10-CM

## 2023-10-31 DIAGNOSIS — Z79.899 LONG TERM USE OF DRUG: ICD-10-CM

## 2023-10-31 DIAGNOSIS — E78.2 MIXED HYPERLIPIDEMIA: ICD-10-CM

## 2023-10-31 DIAGNOSIS — I10 HYPERTENSION, UNSPECIFIED TYPE: ICD-10-CM

## 2023-10-31 LAB — HBA1C MFR BLD: 6 %

## 2023-10-31 PROCEDURE — 83036 HEMOGLOBIN GLYCOSYLATED A1C: CPT | Performed by: FAMILY MEDICINE

## 2023-10-31 PROCEDURE — 3077F SYST BP >= 140 MM HG: CPT | Performed by: FAMILY MEDICINE

## 2023-10-31 PROCEDURE — G0008 ADMIN INFLUENZA VIRUS VAC: HCPCS | Performed by: FAMILY MEDICINE

## 2023-10-31 PROCEDURE — 99214 OFFICE O/P EST MOD 30 MIN: CPT | Performed by: FAMILY MEDICINE

## 2023-10-31 PROCEDURE — 90694 VACC AIIV4 NO PRSRV 0.5ML IM: CPT | Performed by: FAMILY MEDICINE

## 2023-10-31 PROCEDURE — 1123F ACP DISCUSS/DSCN MKR DOCD: CPT | Performed by: FAMILY MEDICINE

## 2023-10-31 PROCEDURE — 3079F DIAST BP 80-89 MM HG: CPT | Performed by: FAMILY MEDICINE

## 2023-10-31 RX ORDER — LISINOPRIL AND HYDROCHLOROTHIAZIDE 20; 12.5 MG/1; MG/1
2 TABLET ORAL DAILY
Qty: 60 TABLET | Refills: 1 | Status: SHIPPED | OUTPATIENT
Start: 2023-10-31

## 2023-10-31 RX ORDER — NYSTATIN 100000 U/G
CREAM TOPICAL
Qty: 30 G | Refills: 3 | Status: SHIPPED | OUTPATIENT
Start: 2023-10-31

## 2023-10-31 SDOH — ECONOMIC STABILITY: FOOD INSECURITY: WITHIN THE PAST 12 MONTHS, YOU WORRIED THAT YOUR FOOD WOULD RUN OUT BEFORE YOU GOT MONEY TO BUY MORE.: NEVER TRUE

## 2023-10-31 SDOH — ECONOMIC STABILITY: INCOME INSECURITY: HOW HARD IS IT FOR YOU TO PAY FOR THE VERY BASICS LIKE FOOD, HOUSING, MEDICAL CARE, AND HEATING?: NOT HARD AT ALL

## 2023-10-31 SDOH — ECONOMIC STABILITY: FOOD INSECURITY: WITHIN THE PAST 12 MONTHS, THE FOOD YOU BOUGHT JUST DIDN'T LAST AND YOU DIDN'T HAVE MONEY TO GET MORE.: NEVER TRUE

## 2023-10-31 SDOH — ECONOMIC STABILITY: HOUSING INSECURITY
IN THE LAST 12 MONTHS, WAS THERE A TIME WHEN YOU DID NOT HAVE A STEADY PLACE TO SLEEP OR SLEPT IN A SHELTER (INCLUDING NOW)?: NO

## 2023-10-31 ASSESSMENT — ENCOUNTER SYMPTOMS
BLOOD IN STOOL: 0
SHORTNESS OF BREATH: 0
COUGH: 0

## 2023-10-31 NOTE — ASSESSMENT & PLAN NOTE
Uncontrolled, changes made today: the following changes in treatment are made: increase lisinopril dose, lab results and schedule of future lab studies reviewed with patient, reviewed medications and side effects in detail, and radiology results and schedule of future radiology studies reviewed with patient and medication adherence emphasized

## 2023-11-01 DIAGNOSIS — E78.2 MIXED HYPERLIPIDEMIA: Primary | ICD-10-CM

## 2023-11-01 LAB
ALBUMIN SERPL-MCNC: 3.9 G/DL (ref 3.5–5)
ALBUMIN/GLOB SERPL: 1.1 (ref 1.1–2.2)
ALP SERPL-CCNC: 74 U/L (ref 45–117)
ALT SERPL-CCNC: 23 U/L (ref 12–78)
ANION GAP SERPL CALC-SCNC: 6 MMOL/L (ref 5–15)
AST SERPL-CCNC: 20 U/L (ref 15–37)
BASOPHILS # BLD: 0.1 K/UL (ref 0–0.1)
BASOPHILS NFR BLD: 1 % (ref 0–1)
BILIRUB SERPL-MCNC: 0.3 MG/DL (ref 0.2–1)
BUN SERPL-MCNC: 19 MG/DL (ref 6–20)
BUN/CREAT SERPL: 21 (ref 12–20)
CALCIUM SERPL-MCNC: 9.1 MG/DL (ref 8.5–10.1)
CHLORIDE SERPL-SCNC: 106 MMOL/L (ref 97–108)
CHOLEST SERPL-MCNC: 197 MG/DL
CO2 SERPL-SCNC: 27 MMOL/L (ref 21–32)
CREAT SERPL-MCNC: 0.91 MG/DL (ref 0.55–1.02)
CREAT UR-MCNC: 74.8 MG/DL
DIFFERENTIAL METHOD BLD: ABNORMAL
EOSINOPHIL # BLD: 0.3 K/UL (ref 0–0.4)
EOSINOPHIL NFR BLD: 3 % (ref 0–7)
ERYTHROCYTE [DISTWIDTH] IN BLOOD BY AUTOMATED COUNT: 13.2 % (ref 11.5–14.5)
GLOBULIN SER CALC-MCNC: 3.7 G/DL (ref 2–4)
GLUCOSE SERPL-MCNC: 87 MG/DL (ref 65–100)
HCT VFR BLD AUTO: 43.4 % (ref 35–47)
HDLC SERPL-MCNC: 67 MG/DL
HDLC SERPL: 2.9 (ref 0–5)
HGB BLD-MCNC: 13.8 G/DL (ref 11.5–16)
IMM GRANULOCYTES # BLD AUTO: 0 K/UL (ref 0–0.04)
IMM GRANULOCYTES NFR BLD AUTO: 0 % (ref 0–0.5)
LDLC SERPL CALC-MCNC: 102.8 MG/DL (ref 0–100)
LYMPHOCYTES # BLD: 4 K/UL (ref 0.8–3.5)
LYMPHOCYTES NFR BLD: 46 % (ref 12–49)
MCH RBC QN AUTO: 28.3 PG (ref 26–34)
MCHC RBC AUTO-ENTMCNC: 31.8 G/DL (ref 30–36.5)
MCV RBC AUTO: 88.9 FL (ref 80–99)
MICROALBUMIN UR-MCNC: 1.7 MG/DL
MICROALBUMIN/CREAT UR-RTO: 23 MG/G (ref 0–30)
MONOCYTES # BLD: 0.6 K/UL (ref 0–1)
MONOCYTES NFR BLD: 7 % (ref 5–13)
NEUTS SEG # BLD: 3.7 K/UL (ref 1.8–8)
NEUTS SEG NFR BLD: 43 % (ref 32–75)
NRBC # BLD: 0 K/UL (ref 0–0.01)
NRBC BLD-RTO: 0 PER 100 WBC
PLATELET # BLD AUTO: 300 K/UL (ref 150–400)
PMV BLD AUTO: 11.6 FL (ref 8.9–12.9)
POTASSIUM SERPL-SCNC: 4 MMOL/L (ref 3.5–5.1)
PROT SERPL-MCNC: 7.6 G/DL (ref 6.4–8.2)
RBC # BLD AUTO: 4.88 M/UL (ref 3.8–5.2)
SODIUM SERPL-SCNC: 139 MMOL/L (ref 136–145)
SPECIMEN HOLD: NORMAL
TRIGL SERPL-MCNC: 136 MG/DL
VLDLC SERPL CALC-MCNC: 27.2 MG/DL
WBC # BLD AUTO: 8.7 K/UL (ref 3.6–11)

## 2023-11-01 RX ORDER — ATORVASTATIN CALCIUM 40 MG/1
40 TABLET, FILM COATED ORAL DAILY
Qty: 30 TABLET | Refills: 1 | Status: SHIPPED | OUTPATIENT
Start: 2023-11-01

## 2023-11-10 ENCOUNTER — HOSPITAL ENCOUNTER (OUTPATIENT)
Facility: HOSPITAL | Age: 68
End: 2023-11-10
Payer: MEDICARE

## 2023-11-10 DIAGNOSIS — Z12.31 SCREENING MAMMOGRAM FOR BREAST CANCER: ICD-10-CM

## 2023-11-10 PROCEDURE — 77063 BREAST TOMOSYNTHESIS BI: CPT

## 2023-12-05 ENCOUNTER — OFFICE VISIT (OUTPATIENT)
Facility: CLINIC | Age: 68
End: 2023-12-05
Payer: MEDICARE

## 2023-12-05 VITALS
WEIGHT: 192 LBS | SYSTOLIC BLOOD PRESSURE: 125 MMHG | HEIGHT: 64 IN | HEART RATE: 79 BPM | BODY MASS INDEX: 32.78 KG/M2 | DIASTOLIC BLOOD PRESSURE: 74 MMHG | OXYGEN SATURATION: 100 % | RESPIRATION RATE: 20 BRPM | TEMPERATURE: 98.7 F

## 2023-12-05 DIAGNOSIS — I10 HYPERTENSION, UNSPECIFIED TYPE: Primary | ICD-10-CM

## 2023-12-05 DIAGNOSIS — E78.2 MIXED HYPERLIPIDEMIA: ICD-10-CM

## 2023-12-05 DIAGNOSIS — Z23 NEED FOR VACCINATION: ICD-10-CM

## 2023-12-05 PROCEDURE — 99214 OFFICE O/P EST MOD 30 MIN: CPT | Performed by: FAMILY MEDICINE

## 2023-12-05 PROCEDURE — 3078F DIAST BP <80 MM HG: CPT | Performed by: FAMILY MEDICINE

## 2023-12-05 PROCEDURE — 3074F SYST BP LT 130 MM HG: CPT | Performed by: FAMILY MEDICINE

## 2023-12-05 PROCEDURE — 1123F ACP DISCUSS/DSCN MKR DOCD: CPT | Performed by: FAMILY MEDICINE

## 2023-12-05 RX ORDER — LISINOPRIL AND HYDROCHLOROTHIAZIDE 20; 12.5 MG/1; MG/1
2 TABLET ORAL DAILY
Qty: 180 TABLET | Refills: 1 | Status: SHIPPED | OUTPATIENT
Start: 2023-12-05

## 2023-12-05 RX ORDER — ATORVASTATIN CALCIUM 40 MG/1
40 TABLET, FILM COATED ORAL DAILY
Qty: 90 TABLET | Refills: 1 | Status: SHIPPED | OUTPATIENT
Start: 2023-12-05

## 2023-12-05 ASSESSMENT — ENCOUNTER SYMPTOMS
COUGH: 0
SHORTNESS OF BREATH: 0

## 2023-12-05 NOTE — PROGRESS NOTES
[facility]  Chief Complaint   Patient presents with    Blood Pressure Check       History of Present Illness:   April D Jamaica Fernandes is a 70 y.o. female       HPI:  Here to f/u for HTN, HLD. Started back on lisinopril/hctz. She is diet controlled dm. FSBS 90s-120s  Hemoglobin A1C, POC   Date Value Ref Range Status   10/31/2023 6.0 % Final       The 10-year ASCVD risk score (Maria D DK, et al., 2019) is: 23.9% so started statin. Tolerating it well. Had eye exam.     Health Maintenance  Health Maintenance Due   Topic Date Due    Hepatitis B vaccine (1 of 3 - Risk 3-dose series) Never done    Respiratory Syncytial Virus (RSV) age 61 yrs+ (3 - 1-dose 60+ series) Never done    Diabetic retinal exam  06/25/2020    Shingles vaccine (2 of 2) 04/21/2023    COVID-19 Vaccine (3 - 2023-24 season) 09/01/2023       Past Medical, Family, and Social History:     Past Medical History:   Diagnosis Date    Asthma     Breast cancer ([facility])     Diabetes ([facility])     Hx of seasonal allergies     Hypertension       Past Surgical History:   Procedure Laterality Date    BIOPSY BREAST      X3    BREAST SURGERY      bilateral breast lumpectomy    COLONOSCOPY N/A 1/6/2023    COLONOSCOPY performed by Rosa Pinto MD at [address] [place] [address] (CERVIX STATUS UNKNOWN)      TONSILLECTOMY         Current Outpatient Medications on File Prior to Visit   Medication Sig Dispense Refill    Multiple Vitamins-Minerals (MULTIVITAMIN ADULTS 50+ PO) Take by mouth      nystatin (MYCOSTATIN) 900768 UNIT/GM cream Apply topically 2 times daily x 7-10 days 30 g 3    cyclobenzaprine (FLEXERIL) 5 MG tablet Take 1 tablet by mouth nightly as needed for Muscle spasms 30 tablet 0    albuterol sulfate HFA (PROVENTIL;VENTOLIN;PROAIR) 108 (90 Base) MCG/ACT inhaler Take 1 puff every 4 hours as needed for shortness of breath or persistent nighttime cough.       albuterol (PROVENTIL) (2.5 MG/3ML) 0.083% nebulizer solution Inhale 3 mLs into the lungs every 4

## 2023-12-05 NOTE — PROGRESS NOTES
1. \"Have you been to the ER, urgent care clinic since your last visit? Hospitalized since your last visit? \" NO    2. \"Have you seen or consulted any other health care providers outside of the [address] [place] [address] since your last visit? \" no    3. For patients aged 43-73: Has the patient had a colonoscopy / FIT/ Cologuard? YES      If the patient is female:    4. For patients aged 43-66: Has the patient had a mammogram within the past 2 years? YES      5. For patients aged 21-65: Has the patient had a pap smear? N/A    Health Maintenance Due   Topic Date Due    Hepatitis B vaccine (1 of 3 - Risk 3-dose series) Never done    Respiratory Syncytial Virus (RSV) age 61 yrs+ (1 - 1-dose 60+ series) Never done    Diabetic retinal exam  06/25/2020    COVID-19 Vaccine (3 - 2023-24 season) 09/01/2023    Eye exam requested.  [address] [place] [address]

## 2024-01-30 ENCOUNTER — OFFICE VISIT (OUTPATIENT)
Facility: CLINIC | Age: 69
End: 2024-01-30
Payer: MEDICARE

## 2024-01-30 VITALS
RESPIRATION RATE: 14 BRPM | WEIGHT: 189.4 LBS | BODY MASS INDEX: 32.33 KG/M2 | OXYGEN SATURATION: 99 % | DIASTOLIC BLOOD PRESSURE: 74 MMHG | SYSTOLIC BLOOD PRESSURE: 124 MMHG | HEIGHT: 64 IN | HEART RATE: 75 BPM | TEMPERATURE: 98.9 F

## 2024-01-30 DIAGNOSIS — J40 BRONCHITIS: Primary | ICD-10-CM

## 2024-01-30 DIAGNOSIS — U07.1 CLINICAL DIAGNOSIS OF COVID-19: ICD-10-CM

## 2024-01-30 DIAGNOSIS — J45.20 MILD INTERMITTENT ASTHMA WITHOUT COMPLICATION: ICD-10-CM

## 2024-01-30 PROCEDURE — 3078F DIAST BP <80 MM HG: CPT | Performed by: FAMILY MEDICINE

## 2024-01-30 PROCEDURE — 99214 OFFICE O/P EST MOD 30 MIN: CPT | Performed by: FAMILY MEDICINE

## 2024-01-30 PROCEDURE — 3074F SYST BP LT 130 MM HG: CPT | Performed by: FAMILY MEDICINE

## 2024-01-30 PROCEDURE — 1123F ACP DISCUSS/DSCN MKR DOCD: CPT | Performed by: FAMILY MEDICINE

## 2024-01-30 RX ORDER — DOXYCYCLINE HYCLATE 100 MG
100 TABLET ORAL 2 TIMES DAILY
Qty: 10 TABLET | Refills: 0 | Status: SHIPPED | OUTPATIENT
Start: 2024-01-30 | End: 2024-02-04

## 2024-01-30 ASSESSMENT — PATIENT HEALTH QUESTIONNAIRE - PHQ9
SUM OF ALL RESPONSES TO PHQ QUESTIONS 1-9: 2
1. LITTLE INTEREST OR PLEASURE IN DOING THINGS: 1
SUM OF ALL RESPONSES TO PHQ QUESTIONS 1-9: 2
SUM OF ALL RESPONSES TO PHQ9 QUESTIONS 1 & 2: 2
2. FEELING DOWN, DEPRESSED OR HOPELESS: 1

## 2024-01-30 NOTE — PROGRESS NOTES
Chief Complaint   Patient presents with    Congestion    Cough         \"Have you been to the ER, urgent care clinic since your last visit?  Hospitalized since your last visit?\"    NO    “Have you seen or consulted any other health care providers outside of [facility] since your last visit?”    [facility] Maintenance Due   Topic Date Due    Respiratory Syncytial Virus (RSV) Pregnant or age 60 yrs+ (1 - 1-dose 60+ series) Never done    Diabetic retinal exam  06/25/2020    COVID-19 Vaccine (3 - 2023-24 season) 09/01/2023    Annual Wellness Visit (Medicare Advantage)  Never done    Diabetic foot exam  02/09/2024

## 2024-01-30 NOTE — PROGRESS NOTES
Chief Complaint   Patient presents with    Congestion    Cough     Covid positive beginning of Jan. Still having congestion, headaches, cough.          April D  is a 68 y.o. female who presents for cough, congestion, headache. Tested positive jan 8 for cov19.   Former smoker, h/o asthma.   Seen at [facility], given augmentin, prednisone and cough med.  It had been over 5 days.     Past Medical History:   Diagnosis Date    Asthma     Breast cancer (HCC)     Diabetes (HCC)     Hx of seasonal allergies     Hypertension        Patient Active Problem List   Diagnosis    Mammogram abnormal    AR (allergic rhinitis)    Microscopic hematuria    HTN (hypertension)    Severe obesity (HCC)    Asthma    Low back pain    Abnormal CPK    Type 2 diabetes mellitus with unspecified complications (HCC)    Knee pain    Alopecia    Mixed hyperlipidemia       Meds:   Current Outpatient Medications   Medication Sig Dispense Refill    doxycycline hyclate (VIBRA-TABS) 100 MG tablet Take 1 tablet by mouth 2 times daily for 5 days 10 tablet 0    atorvastatin (LIPITOR) 40 MG tablet Take 1 tablet by mouth daily 90 tablet 1    lisinopril-hydroCHLOROthiazide (PRINZIDE;ZESTORETIC) 20-12.5 MG per tablet Take 2 tablets by mouth daily 180 tablet 1    Multiple Vitamins-Minerals (MULTIVITAMIN ADULTS 50+ PO) Take by mouth      nystatin (MYCOSTATIN) 667678 UNIT/GM cream Apply topically 2 times daily x 7-10 days 30 g 3    cyclobenzaprine (FLEXERIL) 5 MG tablet Take 1 tablet by mouth nightly as needed for Muscle spasms 30 tablet 0    albuterol sulfate HFA (PROVENTIL;VENTOLIN;PROAIR) 108 (90 Base) MCG/ACT inhaler Take 1 puff every 4 hours as needed for shortness of breath or persistent nighttime cough.      albuterol (PROVENTIL) (2.5 MG/3ML) 0.083% nebulizer solution Inhale 3 mLs into the lungs every 4 hours as needed      cyanocobalamin 500 MCG tablet Take 1 tablet by mouth daily      fluticasone (FLONASE) 50 MCG/ACT nasal spray 2 sprays by Nasal route daily

## 2024-03-04 DIAGNOSIS — I10 HYPERTENSION, UNSPECIFIED TYPE: ICD-10-CM

## 2024-03-04 DIAGNOSIS — E78.2 MIXED HYPERLIPIDEMIA: ICD-10-CM

## 2024-03-04 RX ORDER — LISINOPRIL AND HYDROCHLOROTHIAZIDE 20; 12.5 MG/1; MG/1
2 TABLET ORAL DAILY
Qty: 200 TABLET | Refills: 2 | Status: SHIPPED | OUTPATIENT
Start: 2024-03-04

## 2024-03-04 RX ORDER — ATORVASTATIN CALCIUM 40 MG/1
40 TABLET, FILM COATED ORAL DAILY
Qty: 100 TABLET | Refills: 2 | Status: SHIPPED | OUTPATIENT
Start: 2024-03-04

## 2024-04-29 ENCOUNTER — OFFICE VISIT (OUTPATIENT)
Facility: CLINIC | Age: 69
End: 2024-04-29
Payer: MEDICARE

## 2024-04-29 VITALS
HEIGHT: 64 IN | DIASTOLIC BLOOD PRESSURE: 66 MMHG | SYSTOLIC BLOOD PRESSURE: 117 MMHG | WEIGHT: 192 LBS | OXYGEN SATURATION: 96 % | TEMPERATURE: 98.2 F | RESPIRATION RATE: 17 BRPM | HEART RATE: 70 BPM | BODY MASS INDEX: 32.78 KG/M2

## 2024-04-29 DIAGNOSIS — I10 PRIMARY HYPERTENSION: ICD-10-CM

## 2024-04-29 DIAGNOSIS — Z79.899 LONG TERM USE OF DRUG: ICD-10-CM

## 2024-04-29 DIAGNOSIS — B35.4 TINEA CORPORIS: ICD-10-CM

## 2024-04-29 DIAGNOSIS — E11.8 TYPE 2 DIABETES MELLITUS WITH UNSPECIFIED COMPLICATIONS (HCC): Primary | ICD-10-CM

## 2024-04-29 LAB — HBA1C MFR BLD: 7.8 %

## 2024-04-29 PROCEDURE — G2211 COMPLEX E/M VISIT ADD ON: HCPCS | Performed by: FAMILY MEDICINE

## 2024-04-29 PROCEDURE — 3078F DIAST BP <80 MM HG: CPT | Performed by: FAMILY MEDICINE

## 2024-04-29 PROCEDURE — 99214 OFFICE O/P EST MOD 30 MIN: CPT | Performed by: FAMILY MEDICINE

## 2024-04-29 PROCEDURE — 83036 HEMOGLOBIN GLYCOSYLATED A1C: CPT | Performed by: FAMILY MEDICINE

## 2024-04-29 PROCEDURE — 3074F SYST BP LT 130 MM HG: CPT | Performed by: FAMILY MEDICINE

## 2024-04-29 PROCEDURE — 1123F ACP DISCUSS/DSCN MKR DOCD: CPT | Performed by: FAMILY MEDICINE

## 2024-04-29 RX ORDER — NYSTATIN 100000 U/G
CREAM TOPICAL
Qty: 30 G | Refills: 3 | Status: SHIPPED | OUTPATIENT
Start: 2024-04-29

## 2024-04-29 ASSESSMENT — ENCOUNTER SYMPTOMS
COUGH: 0
SHORTNESS OF BREATH: 0
WHEEZING: 0

## 2024-04-29 NOTE — PROGRESS NOTES
[facility]  Chief Complaint   Patient presents with    Follow-up Chronic Condition       History of Present Illness:   April D  is a 69 y.o. female       HPI:  Here to f/u for HTN, HLD. She is diet controlled dm. She has been eating more poorly recently.   Hemoglobin A1C, POC   Date Value Ref Range Status   10/31/2023 6.0 % Final     The 10-year ASCVD risk score (Maria D RIBEIRO, et al., 2019) is: 23.9% so started statin. Tolerating it well.   Had eye exam.     Health Maintenance  Health Maintenance Due   Topic Date Due    Respiratory Syncytial Virus (RSV) Pregnant or age 60 yrs+ (1 - 1-dose 60+ series) Never done    Diabetic retinal exam  06/25/2020    COVID-19 Vaccine (3 - 2023-24 season) 09/01/2023    Annual Wellness Visit (Medicare Advantage)  Never done       Past Medical, Family, and Social History:     Past Medical History:   Diagnosis Date    Anxiety 01/19/2924    Asthma     Breast cancer (HCC)     Chronic back pain     Diabetes (HCC)     Hx of seasonal allergies     Hypertension     Osteoarthritis 2010      Past Surgical History:   Procedure Laterality Date    APPENDECTOMY      BIOPSY BREAST      X3    BREAST SURGERY      bilateral breast lumpectomy    COLONOSCOPY N/A 1/6/2023    COLONOSCOPY performed by Matt Younger MD at [facility] ENDOSCOPY    HYSTERECTOMY (CERVIX STATUS UNKNOWN)      HYSTERECTOMY, TOTAL ABDOMINAL (CERVIX REMOVED)  1976    TONSILLECTOMY         Current Outpatient Medications on File Prior to Visit   Medication Sig Dispense Refill    atorvastatin (LIPITOR) 40 MG tablet TAKE 1 TABLET BY MOUTH DAILY 100 tablet 2    lisinopril-hydroCHLOROthiazide (PRINZIDE;ZESTORETIC) 20-12.5 MG per tablet TAKE 2 TABLETS BY MOUTH DAILY 200 tablet 2    Multiple Vitamins-Minerals (MULTIVITAMIN ADULTS 50+ PO) Take by mouth      cyclobenzaprine (FLEXERIL) 5 MG tablet Take 1 tablet by mouth nightly as needed for Muscle spasms 30 tablet 0    albuterol sulfate HFA (PROVENTIL;VENTOLIN;PROAIR) 108 (90

## 2024-04-29 NOTE — ASSESSMENT & PLAN NOTE
Borderline controlled, lifestyle modifications recommended  Advised starting medications but patient refused. She will work on her diet and f/u in 3 months.

## 2024-04-29 NOTE — PROGRESS NOTES
\"Have you been to the ER, urgent care clinic since your last visit?  Hospitalized since your last visit?\"    NO    “Have you seen or consulted any other health care providers outside of [facility] since your last visit?”    NO            Click Here for Release of Records Request     Health Maintenance Due   Topic Date Due    Respiratory Syncytial Virus (RSV) Pregnant or age 60 yrs+ (1 - 1-dose 60+ series) Never done    Diabetic retinal exam  06/25/2020    COVID-19 Vaccine (3 - 2023-24 season) 09/01/2023    Annual Wellness Visit (Medicare Advantage)  Never done    Diabetic foot exam  02/09/2024    A1C test (Diabetic or Prediabetic)  04/30/2024

## 2024-04-30 LAB
ALBUMIN SERPL-MCNC: 3.8 G/DL (ref 3.5–5)
ALBUMIN/GLOB SERPL: 1.3 (ref 1.1–2.2)
ALP SERPL-CCNC: 82 U/L (ref 45–117)
ALT SERPL-CCNC: 26 U/L (ref 12–78)
ANION GAP SERPL CALC-SCNC: 7 MMOL/L (ref 5–15)
AST SERPL-CCNC: 25 U/L (ref 15–37)
BASOPHILS # BLD: 0.1 K/UL (ref 0–0.1)
BASOPHILS NFR BLD: 1 % (ref 0–1)
BILIRUB SERPL-MCNC: 0.4 MG/DL (ref 0.2–1)
BUN SERPL-MCNC: 24 MG/DL (ref 6–20)
BUN/CREAT SERPL: 24 (ref 12–20)
CALCIUM SERPL-MCNC: 10 MG/DL (ref 8.5–10.1)
CHLORIDE SERPL-SCNC: 106 MMOL/L (ref 97–108)
CO2 SERPL-SCNC: 27 MMOL/L (ref 21–32)
CREAT SERPL-MCNC: 1.02 MG/DL (ref 0.55–1.02)
DIFFERENTIAL METHOD BLD: ABNORMAL
EOSINOPHIL # BLD: 0.2 K/UL (ref 0–0.4)
EOSINOPHIL NFR BLD: 2 % (ref 0–7)
ERYTHROCYTE [DISTWIDTH] IN BLOOD BY AUTOMATED COUNT: 13.7 % (ref 11.5–14.5)
GLOBULIN SER CALC-MCNC: 3 G/DL (ref 2–4)
GLUCOSE SERPL-MCNC: 148 MG/DL (ref 65–100)
HCT VFR BLD AUTO: 40.9 % (ref 35–47)
HGB BLD-MCNC: 12.9 G/DL (ref 11.5–16)
IMM GRANULOCYTES # BLD AUTO: 0 K/UL (ref 0–0.04)
IMM GRANULOCYTES NFR BLD AUTO: 0 % (ref 0–0.5)
LYMPHOCYTES # BLD: 4.6 K/UL (ref 0.8–3.5)
LYMPHOCYTES NFR BLD: 54 % (ref 12–49)
MCH RBC QN AUTO: 29.4 PG (ref 26–34)
MCHC RBC AUTO-ENTMCNC: 31.5 G/DL (ref 30–36.5)
MCV RBC AUTO: 93.2 FL (ref 80–99)
MONOCYTES # BLD: 0.6 K/UL (ref 0–1)
MONOCYTES NFR BLD: 7 % (ref 5–13)
NEUTS SEG # BLD: 3.1 K/UL (ref 1.8–8)
NEUTS SEG NFR BLD: 36 % (ref 32–75)
NRBC # BLD: 0 K/UL (ref 0–0.01)
NRBC BLD-RTO: 0 PER 100 WBC
PLATELET # BLD AUTO: 260 K/UL (ref 150–400)
PMV BLD AUTO: 11.4 FL (ref 8.9–12.9)
POTASSIUM SERPL-SCNC: 4 MMOL/L (ref 3.5–5.1)
PROT SERPL-MCNC: 6.8 G/DL (ref 6.4–8.2)
RBC # BLD AUTO: 4.39 M/UL (ref 3.8–5.2)
SODIUM SERPL-SCNC: 140 MMOL/L (ref 136–145)
WBC # BLD AUTO: 8.6 K/UL (ref 3.6–11)

## 2024-07-02 ENCOUNTER — OFFICE VISIT (OUTPATIENT)
Facility: CLINIC | Age: 69
End: 2024-07-02
Payer: MEDICARE

## 2024-07-02 VITALS
WEIGHT: 193 LBS | TEMPERATURE: 98.4 F | SYSTOLIC BLOOD PRESSURE: 124 MMHG | DIASTOLIC BLOOD PRESSURE: 74 MMHG | RESPIRATION RATE: 16 BRPM | HEIGHT: 64 IN | OXYGEN SATURATION: 97 % | HEART RATE: 62 BPM | BODY MASS INDEX: 32.95 KG/M2

## 2024-07-02 DIAGNOSIS — M79.672 LEFT FOOT PAIN: Primary | ICD-10-CM

## 2024-07-02 PROBLEM — K21.9 GASTROESOPHAGEAL REFLUX DISEASE: Status: ACTIVE | Noted: 2024-07-02

## 2024-07-02 PROBLEM — J30.2 SEASONAL ALLERGIES: Status: ACTIVE | Noted: 2024-07-02

## 2024-07-02 PROCEDURE — 3074F SYST BP LT 130 MM HG: CPT | Performed by: FAMILY MEDICINE

## 2024-07-02 PROCEDURE — 3078F DIAST BP <80 MM HG: CPT | Performed by: FAMILY MEDICINE

## 2024-07-02 PROCEDURE — 1123F ACP DISCUSS/DSCN MKR DOCD: CPT | Performed by: FAMILY MEDICINE

## 2024-07-02 PROCEDURE — 99213 OFFICE O/P EST LOW 20 MIN: CPT | Performed by: FAMILY MEDICINE

## 2024-07-02 ASSESSMENT — ENCOUNTER SYMPTOMS
COUGH: 0
SHORTNESS OF BREATH: 0

## 2024-07-02 NOTE — PROGRESS NOTES
[facility]  Chief Complaint   Patient presents with    Foot Swelling       History of Present Illness:   April D  is a 69 y.o. female       HPI:  C/o L foot swelling. Went to urgent care. Doppler negative.   Went to [place] on 6/6/24. Went to play on 6/8/24. Driving a lot but also walking in flat shoes.   Pain started the next Monday. Never hurt so bad that a sheet could not lay on it.   Took naproxen and it helped. Swelling just some in foot, now no longer in leg.      Health Maintenance  Health Maintenance Due   Topic Date Due    Respiratory Syncytial Virus (RSV) Pregnant or age 60 yrs+ (1 - 1-dose 60+ series) Never done    Diabetic retinal exam  06/25/2020    COVID-19 Vaccine (3 - 2023-24 season) 09/01/2023    Annual Wellness Visit (Medicare Advantage)  Never done       Past Medical, Family, and Social History:     Past Medical History:   Diagnosis Date    Anxiety 01/19/2924    Asthma     Breast cancer (HCC)     Chronic back pain     Diabetes (HCC)     Hx of seasonal allergies     Hypertension     Osteoarthritis 2010      Past Surgical History:   Procedure Laterality Date    APPENDECTOMY      BIOPSY BREAST      X3    BREAST SURGERY      bilateral breast lumpectomy    COLONOSCOPY N/A 1/6/2023    COLONOSCOPY performed by Matt [facility]ounger MD at [facility] ENDOSCOPY    HYSTERECTOMY (CERVIX STATUS UNKNOWN)      HYSTERECTOMY, TOTAL ABDOMINAL (CERVIX REMOVED)  1976    TONSILLECTOMY         Current Outpatient Medications on File Prior to Visit   Medication Sig Dispense Refill    nystatin (MYCOSTATIN) 591737 UNIT/GM cream Apply topically 2 times daily x 7-10 days 30 g 3    atorvastatin (LIPITOR) 40 MG tablet TAKE 1 TABLET BY MOUTH DAILY 100 tablet 2    lisinopril-hydroCHLOROthiazide (PRINZIDE;ZESTORETIC) 20-12.5 MG per tablet TAKE 2 TABLETS BY MOUTH DAILY 200 tablet 2    Multiple Vitamins-Minerals (MULTIVITAMIN ADULTS 50+ PO) Take by mouth      cyclobenzaprine (FLEXERIL) 5 MG tablet Take 1 tablet by mouth

## 2024-07-02 NOTE — PROGRESS NOTES
\"Have you been to the ER, urgent care clinic since your last visit?  Hospitalized since your last visit?\"    Yes - [facility]     “Have you seen or consulted any other health care providers outside of [facility] since your last visit?”    NO            Click Here for Release of Records Request     Health Maintenance Due   Topic Date Due    Respiratory Syncytial Virus (RSV) Pregnant or age 60 yrs+ (1 - 1-dose 60+ series) Never done    Diabetic retinal exam  06/25/2020    COVID-19 Vaccine (3 - 2023-24 season) 09/01/2023    Annual Wellness Visit (Medicare Advantage)  Never done

## 2024-07-29 SDOH — HEALTH STABILITY: PHYSICAL HEALTH: ON AVERAGE, HOW MANY DAYS PER WEEK DO YOU ENGAGE IN MODERATE TO STRENUOUS EXERCISE (LIKE A BRISK WALK)?: 1 DAY

## 2024-07-29 SDOH — HEALTH STABILITY: PHYSICAL HEALTH: ON AVERAGE, HOW MANY MINUTES DO YOU ENGAGE IN EXERCISE AT THIS LEVEL?: 10 MIN

## 2024-07-29 ASSESSMENT — PATIENT HEALTH QUESTIONNAIRE - PHQ9
3. TROUBLE FALLING OR STAYING ASLEEP: MORE THAN HALF THE DAYS
4. FEELING TIRED OR HAVING LITTLE ENERGY: SEVERAL DAYS
8. MOVING OR SPEAKING SO SLOWLY THAT OTHER PEOPLE COULD HAVE NOTICED. OR THE OPPOSITE, BEING SO FIGETY OR RESTLESS THAT YOU HAVE BEEN MOVING AROUND A LOT MORE THAN USUAL: NOT AT ALL
10. IF YOU CHECKED OFF ANY PROBLEMS, HOW DIFFICULT HAVE THESE PROBLEMS MADE IT FOR YOU TO DO YOUR WORK, TAKE CARE OF THINGS AT HOME, OR GET ALONG WITH OTHER PEOPLE: NOT DIFFICULT AT ALL
SUM OF ALL RESPONSES TO PHQ QUESTIONS 1-9: 9
SUM OF ALL RESPONSES TO PHQ QUESTIONS 1-9: 9
7. TROUBLE CONCENTRATING ON THINGS, SUCH AS READING THE NEWSPAPER OR WATCHING TELEVISION: NOT AT ALL
SUM OF ALL RESPONSES TO PHQ9 QUESTIONS 1 & 2: 4
2. FEELING DOWN, DEPRESSED OR HOPELESS: MORE THAN HALF THE DAYS
9. THOUGHTS THAT YOU WOULD BE BETTER OFF DEAD, OR OF HURTING YOURSELF: NOT AT ALL
6. FEELING BAD ABOUT YOURSELF - OR THAT YOU ARE A FAILURE OR HAVE LET YOURSELF OR YOUR FAMILY DOWN: NOT AT ALL
SUM OF ALL RESPONSES TO PHQ QUESTIONS 1-9: 9
1. LITTLE INTEREST OR PLEASURE IN DOING THINGS: MORE THAN HALF THE DAYS
5. POOR APPETITE OR OVEREATING: MORE THAN HALF THE DAYS
SUM OF ALL RESPONSES TO PHQ QUESTIONS 1-9: 9

## 2024-07-29 ASSESSMENT — LIFESTYLE VARIABLES
HOW OFTEN DO YOU HAVE A DRINK CONTAINING ALCOHOL: 1
HOW OFTEN DO YOU HAVE SIX OR MORE DRINKS ON ONE OCCASION: 1
HOW OFTEN DO YOU HAVE A DRINK CONTAINING ALCOHOL: NEVER
HOW MANY STANDARD DRINKS CONTAINING ALCOHOL DO YOU HAVE ON A TYPICAL DAY: PATIENT DOES NOT DRINK
HOW MANY STANDARD DRINKS CONTAINING ALCOHOL DO YOU HAVE ON A TYPICAL DAY: 0

## 2024-07-31 ENCOUNTER — OFFICE VISIT (OUTPATIENT)
Facility: CLINIC | Age: 69
End: 2024-07-31
Payer: MEDICARE

## 2024-07-31 VITALS
HEART RATE: 63 BPM | TEMPERATURE: 98.5 F | WEIGHT: 190 LBS | SYSTOLIC BLOOD PRESSURE: 119 MMHG | OXYGEN SATURATION: 98 % | BODY MASS INDEX: 32.44 KG/M2 | DIASTOLIC BLOOD PRESSURE: 79 MMHG | RESPIRATION RATE: 18 BRPM | HEIGHT: 64 IN

## 2024-07-31 DIAGNOSIS — Z00.00 MEDICARE ANNUAL WELLNESS VISIT, SUBSEQUENT: Primary | ICD-10-CM

## 2024-07-31 PROCEDURE — G0439 PPPS, SUBSEQ VISIT: HCPCS | Performed by: FAMILY MEDICINE

## 2024-07-31 PROCEDURE — 3078F DIAST BP <80 MM HG: CPT | Performed by: FAMILY MEDICINE

## 2024-07-31 PROCEDURE — 1123F ACP DISCUSS/DSCN MKR DOCD: CPT | Performed by: FAMILY MEDICINE

## 2024-07-31 PROCEDURE — 3074F SYST BP LT 130 MM HG: CPT | Performed by: FAMILY MEDICINE

## 2024-07-31 NOTE — PROGRESS NOTES
Chief Complaint   Patient presents with    Medicare AWV [employer]- Records requested for Retinal Exam. See Letters.    \"Have you been to the ER, urgent care clinic since your last visit?  Hospitalized since your last visit?\"    NO    “Have you seen or consulted any other health care providers outside of [facility] since your last visit?”    NO            Click Here for Release of Records Request     Health Maintenance Due   Topic Date Due    Respiratory Syncytial Virus (RSV) Pregnant or age 60 yrs+ (1 - 1-dose 60+ series) Never done    Diabetic retinal exam  06/25/2020    COVID-19 Vaccine (3 - 2023-24 season) 09/01/2023    Annual Wellness Visit (Medicare Advantage)  Never done

## 2024-07-31 NOTE — PROGRESS NOTES
Medicare Annual Wellness Visit    April D  is here for Medicare AWV    Assessment & Plan   Medicare annual wellness visit, subsequent  Explained and discussed with patient an Advanced Medical Directive. Provided patient blank Advanced Medical Directive. Reviewed Advanced Medical Directive paperwork with patient with a brief description of how to complete the form. Requested that if document completed, to please provide a copy of completed Advanced Medical Directive to [employer]. Patient verbalized understanding.     Recommendations for Preventive Services Due: see orders and patient instructions/AVS.  Recommended screening schedule for the next 5-10 years is provided to the patient in written form: see Patient Instructions/AVS.     Return in about 3 months (around 10/31/2024) for htn.     Subjective       Patient's complete Health Risk Assessment and screening values have been reviewed and are found in Flowsheets. The following problems were reviewed today and where indicated follow up appointments were made and/or referrals ordered.    Positive Risk Factor Screenings with Interventions:        Depression:  PHQ-2 Score: 4  PHQ-9 Total Score: 9  Total Score Interpretation: 5-9 = mild depression  Interventions:  See AVS for additional education material          General HRA Questions:  Select all that apply: (!) Loneliness, Anger  Interventions - Loneliness:  See AVS for additional education material  Interventions - Anger:  See AVS for additional education material      Inactivity:  On average, how many days per week do you engage in moderate to strenuous exercise (like a brisk walk)?: 1 day (!) Abnormal  On average, how many minutes do you engage in exercise at this level?: 10 min  Interventions:  See AVS for additional education material    Poor Eating Habits/Diet:  Do you eat balanced/healthy meals regularly?: (!) No  Interventions:  See AVS for additional education material    Abnormal BMI (obese):  Body mass

## 2024-07-31 NOTE — PATIENT INSTRUCTIONS
They increase your risk of heart disease by raising cholesterol levels.     Limit the amount of solid fat--butter, margarine, and shortening--you eat. Use olive, peanut, or canola oil when you cook. Bake, broil, and steam foods instead of frying them.     Eat a variety of fruit and vegetables every day. Dark green, deep orange, red, or yellow fruits and vegetables are especially good for you. Examples include spinach, carrots, peaches, and berries.     Foods high in fiber can reduce your cholesterol and provide important vitamins and minerals. High-fiber foods include whole-grain cereals and breads, oatmeal, beans, brown rice, citrus fruits, and apples.     Eat lean proteins. Heart-healthy proteins include seafood, lean meats and poultry, eggs, beans, peas, nuts, seeds, and soy products.     Limit drinks and foods with added sugar. These include candy, desserts, and soda pop.   Heart-healthy lifestyle    If your doctor recommends it, get more exercise. For many people, walking is a good choice. Or you may want to swim, bike, or do other activities. Bit by bit, increase the time you're active every day. Try for at least 30 minutes on most days of the week.     Try to quit or cut back on using tobacco and other nicotine products. This includes smoking and vaping. If you need help quitting, talk to your doctor about stop-smoking programs and medicines. These can increase your chances of quitting for good. Quitting is one of the most important things you can do to protect your heart. It is never too late to quit. Try to avoid secondhand smoke too.     Stay at a weight that's healthy for you. Talk to your doctor if you need help losing weight.     Try to get 7 to 9 hours of sleep each night.     Limit alcohol to 2 drinks a day for men and 1 drink a day for women. Too much alcohol can cause health problems.     Manage other health problems such as diabetes, high blood pressure, and high cholesterol. If you think you may

## 2024-09-10 ENCOUNTER — OFFICE VISIT (OUTPATIENT)
Facility: CLINIC | Age: 69
End: 2024-09-10
Payer: MEDICARE

## 2024-09-10 VITALS
HEIGHT: 64 IN | OXYGEN SATURATION: 97 % | BODY MASS INDEX: 33.46 KG/M2 | HEART RATE: 78 BPM | SYSTOLIC BLOOD PRESSURE: 134 MMHG | RESPIRATION RATE: 18 BRPM | DIASTOLIC BLOOD PRESSURE: 68 MMHG | WEIGHT: 196 LBS | TEMPERATURE: 98.5 F

## 2024-09-10 DIAGNOSIS — L03.317 CELLULITIS OF BUTTOCK: Primary | ICD-10-CM

## 2024-09-10 DIAGNOSIS — T14.8XXA BLISTER: ICD-10-CM

## 2024-09-10 DIAGNOSIS — E11.8 TYPE 2 DIABETES MELLITUS WITH UNSPECIFIED COMPLICATIONS (HCC): ICD-10-CM

## 2024-09-10 PROCEDURE — 99213 OFFICE O/P EST LOW 20 MIN: CPT | Performed by: FAMILY MEDICINE

## 2024-09-10 PROCEDURE — 3078F DIAST BP <80 MM HG: CPT | Performed by: FAMILY MEDICINE

## 2024-09-10 PROCEDURE — G2211 COMPLEX E/M VISIT ADD ON: HCPCS | Performed by: FAMILY MEDICINE

## 2024-09-10 PROCEDURE — 3075F SYST BP GE 130 - 139MM HG: CPT | Performed by: FAMILY MEDICINE

## 2024-09-10 PROCEDURE — 1123F ACP DISCUSS/DSCN MKR DOCD: CPT | Performed by: FAMILY MEDICINE

## 2024-09-10 RX ORDER — MUPIROCIN 20 MG/G
OINTMENT TOPICAL
Qty: 30 G | Refills: 1 | Status: SHIPPED | OUTPATIENT
Start: 2024-09-10 | End: 2024-09-17

## 2024-10-17 ENCOUNTER — OFFICE VISIT (OUTPATIENT)
Facility: CLINIC | Age: 69
End: 2024-10-17
Payer: MEDICARE

## 2024-10-17 VITALS
HEIGHT: 64 IN | DIASTOLIC BLOOD PRESSURE: 70 MMHG | HEART RATE: 66 BPM | WEIGHT: 192 LBS | BODY MASS INDEX: 32.78 KG/M2 | RESPIRATION RATE: 18 BRPM | TEMPERATURE: 97.8 F | OXYGEN SATURATION: 100 % | SYSTOLIC BLOOD PRESSURE: 126 MMHG

## 2024-10-17 DIAGNOSIS — E78.2 MIXED HYPERLIPIDEMIA: ICD-10-CM

## 2024-10-17 DIAGNOSIS — Z79.899 LONG TERM USE OF DRUG: ICD-10-CM

## 2024-10-17 DIAGNOSIS — I10 HYPERTENSION, UNSPECIFIED TYPE: ICD-10-CM

## 2024-10-17 DIAGNOSIS — Z23 NEED FOR VACCINATION: ICD-10-CM

## 2024-10-17 DIAGNOSIS — E11.8 TYPE 2 DIABETES MELLITUS WITH UNSPECIFIED COMPLICATIONS (HCC): Primary | ICD-10-CM

## 2024-10-17 PROCEDURE — 3078F DIAST BP <80 MM HG: CPT | Performed by: FAMILY MEDICINE

## 2024-10-17 PROCEDURE — 99214 OFFICE O/P EST MOD 30 MIN: CPT | Performed by: FAMILY MEDICINE

## 2024-10-17 PROCEDURE — G0008 ADMIN INFLUENZA VIRUS VAC: HCPCS | Performed by: FAMILY MEDICINE

## 2024-10-17 PROCEDURE — 90653 IIV ADJUVANT VACCINE IM: CPT | Performed by: FAMILY MEDICINE

## 2024-10-17 PROCEDURE — 1123F ACP DISCUSS/DSCN MKR DOCD: CPT | Performed by: FAMILY MEDICINE

## 2024-10-17 PROCEDURE — 3074F SYST BP LT 130 MM HG: CPT | Performed by: FAMILY MEDICINE

## 2024-10-17 RX ORDER — LISINOPRIL AND HYDROCHLOROTHIAZIDE 12.5; 2 MG/1; MG/1
2 TABLET ORAL DAILY
Qty: 200 TABLET | Refills: 2 | Status: SHIPPED | OUTPATIENT
Start: 2024-10-17

## 2024-10-17 RX ORDER — ATORVASTATIN CALCIUM 40 MG/1
40 TABLET, FILM COATED ORAL DAILY
Qty: 100 TABLET | Refills: 2 | Status: SHIPPED | OUTPATIENT
Start: 2024-10-17

## 2024-10-17 ASSESSMENT — ENCOUNTER SYMPTOMS
SHORTNESS OF BREATH: 0
BLOOD IN STOOL: 0
COUGH: 0

## 2024-10-17 NOTE — ASSESSMENT & PLAN NOTE
Controlled, Current treatment plan is effective, no change in therapy , lab results and schedule of future lab studies reviewed with patient, and reviewed medications and side effects in detail    Orders:    Hemoglobin A1C; Future    Hemoglobin A1C

## 2024-10-17 NOTE — PROGRESS NOTES
\"Have you been to the ER, urgent care clinic since your last visit?  Hospitalized since your last visit?\"    NO    “Have you seen or consulted any other health care providers outside of [facility] since your last visit?”    NO            Click Here for Release of Records Request     Health Maintenance Due   Topic Date Due    Respiratory Syncytial Virus (RSV) Pregnant or age 60 yrs+ (1 - 1-dose 60+ series) Never done    Diabetic retinal exam  06/25/2020    COVID-19 Vaccine (3 - 2023-24 season) 09/01/2024    A1C test (Diabetic or Prediabetic)  10/29/2024    Diabetic Alb to Cr ratio (uACR) test  10/31/2024    Lipids  10/31/2024    Breast cancer screen  11/10/2024

## 2024-10-17 NOTE — ASSESSMENT & PLAN NOTE
At goal, continue current meds    Orders:    lisinopril-hydroCHLOROthiazide (PRINZIDE;ZESTORETIC) 20-12.5 MG per tablet; Take 2 tablets by mouth daily

## 2024-10-17 NOTE — PROGRESS NOTES
[facility]  Chief Complaint   Patient presents with    3 Month Follow-Up       History of Present Illness:   April D  is a 69 y.o. female       HPI:  Here for diabetes. FSBS high 198, eating ice cream. Avg 90-120s. Diet controlled.   Had eye exam done. Taking meds as directed.     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   Hemoglobin A1C   Date Value Ref Range Status   09/16/2022 6.6 (H) 4.0 - 5.6 % Final     Comment:     NEW METHOD  PLEASE NOTE NEW REFERENCE RANGE  (NOTE)  HbA1C Interpretive Ranges  <5.7              Normal  5.7 - 6.4         Consider Prediabetes  >6.5              Consider Diabetes       Hemoglobin A1C, POC   Date Value Ref Range Status   04/29/2024 7.8 % Final         Health Maintenance  Health Maintenance Due   Topic Date Due    Respiratory Syncytial Virus (RSV) Pregnant or age 60 yrs+ (1 - 1-dose 60+ series) Never done    Diabetic retinal exam  06/25/2020    COVID-19 Vaccine (3 - 2023-24 season) 09/01/2024    A1C test (Diabetic or Prediabetic)  10/29/2024    Diabetic Alb to Cr ratio (uACR) test  10/31/2024    Lipids  10/31/2024    Breast cancer screen  11/10/2024       Past Medical, Family, and Social History:     Past Medical History:   Diagnosis Date    Anxiety 01/19/2924    Asthma     Breast cancer (HCC)     Chronic back pain     Diabetes (HCC)     Hx of seasonal allergies     Hypertension     Osteoarthritis 2010      Past Surgical History:   Procedure Laterality Date    APPENDECTOMY      BIOPSY BREAST      X3    BREAST SURGERY      bilateral breast lumpectomy    COLONOSCOPY N/A 1/6/2023    COLONOSCOPY performed by Matt Younger MD at [facility] ENDOSCOPY    HYSTERECTOMY (CERVIX STATUS UNKNOWN)      HYSTERECTOMY, TOTAL ABDOMINAL (CERVIX REMOVED)  1976    TONSILLECTOMY         Current Outpatient Medications on File Prior to Visit   Medication Sig Dispense Refill    nystatin (MYCOSTATIN) 342651 UNIT/GM cream Apply topically 2 times daily x 7-10 days 30 g 3

## 2024-10-18 LAB
ALBUMIN SERPL-MCNC: 3.9 G/DL (ref 3.5–5)
ALBUMIN/GLOB SERPL: 1.1 (ref 1.1–2.2)
ALP SERPL-CCNC: 82 U/L (ref 45–117)
ALT SERPL-CCNC: 26 U/L (ref 12–78)
ANION GAP SERPL CALC-SCNC: 7 MMOL/L (ref 2–12)
AST SERPL-CCNC: 23 U/L (ref 15–37)
BASOPHILS # BLD: 0.1 K/UL (ref 0–0.1)
BASOPHILS NFR BLD: 1 % (ref 0–1)
BILIRUB SERPL-MCNC: 0.6 MG/DL (ref 0.2–1)
BUN SERPL-MCNC: 19 MG/DL (ref 6–20)
BUN/CREAT SERPL: 21 (ref 12–20)
CALCIUM SERPL-MCNC: 10 MG/DL (ref 8.5–10.1)
CHLORIDE SERPL-SCNC: 104 MMOL/L (ref 97–108)
CHOLEST SERPL-MCNC: 132 MG/DL
CO2 SERPL-SCNC: 26 MMOL/L (ref 21–32)
CREAT SERPL-MCNC: 0.91 MG/DL (ref 0.55–1.02)
DIFFERENTIAL METHOD BLD: ABNORMAL
EOSINOPHIL # BLD: 0.2 K/UL (ref 0–0.4)
EOSINOPHIL NFR BLD: 2 % (ref 0–7)
ERYTHROCYTE [DISTWIDTH] IN BLOOD BY AUTOMATED COUNT: 13.1 % (ref 11.5–14.5)
EST. AVERAGE GLUCOSE BLD GHB EST-MCNC: 177 MG/DL
GLOBULIN SER CALC-MCNC: 3.5 G/DL (ref 2–4)
GLUCOSE SERPL-MCNC: 114 MG/DL (ref 65–100)
HBA1C MFR BLD: 7.8 % (ref 4–5.6)
HCT VFR BLD AUTO: 38.8 % (ref 35–47)
HDLC SERPL-MCNC: 65 MG/DL
HDLC SERPL: 2 (ref 0–5)
HGB BLD-MCNC: 13 G/DL (ref 11.5–16)
IMM GRANULOCYTES # BLD AUTO: 0 K/UL (ref 0–0.04)
IMM GRANULOCYTES NFR BLD AUTO: 0 % (ref 0–0.5)
LDLC SERPL CALC-MCNC: 50.6 MG/DL (ref 0–100)
LYMPHOCYTES # BLD: 5.3 K/UL (ref 0.8–3.5)
LYMPHOCYTES NFR BLD: 50 % (ref 12–49)
MCH RBC QN AUTO: 28.8 PG (ref 26–34)
MCHC RBC AUTO-ENTMCNC: 33.5 G/DL (ref 30–36.5)
MCV RBC AUTO: 86 FL (ref 80–99)
MONOCYTES # BLD: 0.7 K/UL (ref 0–1)
MONOCYTES NFR BLD: 7 % (ref 5–13)
NEUTS SEG # BLD: 4.1 K/UL (ref 1.8–8)
NEUTS SEG NFR BLD: 40 % (ref 32–75)
NRBC # BLD: 0 K/UL (ref 0–0.01)
NRBC BLD-RTO: 0 PER 100 WBC
PLATELET # BLD AUTO: 301 K/UL (ref 150–400)
PMV BLD AUTO: 11.4 FL (ref 8.9–12.9)
POTASSIUM SERPL-SCNC: 3.7 MMOL/L (ref 3.5–5.1)
PROT SERPL-MCNC: 7.4 G/DL (ref 6.4–8.2)
RBC # BLD AUTO: 4.51 M/UL (ref 3.8–5.2)
SODIUM SERPL-SCNC: 137 MMOL/L (ref 136–145)
TRIGL SERPL-MCNC: 82 MG/DL
VLDLC SERPL CALC-MCNC: 16.4 MG/DL
WBC # BLD AUTO: 10.4 K/UL (ref 3.6–11)

## 2024-10-21 ENCOUNTER — TELEPHONE (OUTPATIENT)
Facility: CLINIC | Age: 69
End: 2024-10-21

## 2024-10-21 DIAGNOSIS — E11.8 TYPE 2 DIABETES MELLITUS WITH UNSPECIFIED COMPLICATIONS (HCC): Primary | ICD-10-CM

## 2024-10-21 NOTE — TELEPHONE ENCOUNTER
Sent in jardiance 10 mg daily, may titrate up.   Estimated Creatinine Clearance: 61 mL/min (based on SCr of 0.91 mg/dL).

## 2024-10-21 NOTE — TELEPHONE ENCOUNTER
Spoke to patient. Advised per Dr. Small:  Her A1c was worse, up to 7.8. Could not tolerate metformin in past.   I suggest either jardiance (pill with CV protection) or GLP such as ozempic or trulicity but those are injections. Can you see which she would prefer?     She would like to try Jardiance.

## 2024-10-25 NOTE — TELEPHONE ENCOUNTER
Attempted to call. No answer. Message left.   Per Dr. Small regarding having A1c checked in 3 months after taking jardiance vs at next appt:  Up to her. She can just check fsbs and call if remain elevated or repeat A1c in 3 months.

## 2024-11-07 ENCOUNTER — TRANSCRIBE ORDERS (OUTPATIENT)
Facility: HOSPITAL | Age: 69
End: 2024-11-07

## 2024-11-07 DIAGNOSIS — Z12.31 VISIT FOR SCREENING MAMMOGRAM: Primary | ICD-10-CM

## 2024-11-22 ENCOUNTER — HOSPITAL ENCOUNTER (OUTPATIENT)
Facility: HOSPITAL | Age: 69
Discharge: HOME | End: 2024-11-22
Attending: FAMILY MEDICINE
Payer: MEDICARE

## 2024-11-22 VITALS — WEIGHT: 187 LBS | HEIGHT: 64 IN | BODY MASS INDEX: 31.92 KG/M2

## 2024-11-22 DIAGNOSIS — Z12.31 VISIT FOR SCREENING MAMMOGRAM: ICD-10-CM

## 2024-11-22 PROCEDURE — 77063 BREAST TOMOSYNTHESIS BI: CPT

## 2024-12-29 DIAGNOSIS — E11.8 TYPE 2 DIABETES MELLITUS WITH UNSPECIFIED COMPLICATIONS (HCC): ICD-10-CM

## 2024-12-30 RX ORDER — EMPAGLIFLOZIN 10 MG/1
10 TABLET, FILM COATED ORAL DAILY
Qty: 100 TABLET | Refills: 2 | Status: SHIPPED | OUTPATIENT
Start: 2024-12-30

## 2025-01-07 ENCOUNTER — OFFICE VISIT (OUTPATIENT)
Facility: CLINIC | Age: 70
End: 2025-01-07
Payer: MEDICARE

## 2025-01-07 VITALS
TEMPERATURE: 98.5 F | BODY MASS INDEX: 32.1 KG/M2 | SYSTOLIC BLOOD PRESSURE: 111 MMHG | WEIGHT: 188 LBS | RESPIRATION RATE: 14 BRPM | HEART RATE: 60 BPM | HEIGHT: 64 IN | DIASTOLIC BLOOD PRESSURE: 71 MMHG | OXYGEN SATURATION: 98 %

## 2025-01-07 DIAGNOSIS — E11.8 TYPE 2 DIABETES MELLITUS WITH UNSPECIFIED COMPLICATIONS (HCC): Primary | ICD-10-CM

## 2025-01-07 DIAGNOSIS — G56.01 CARPAL TUNNEL SYNDROME OF RIGHT WRIST: ICD-10-CM

## 2025-01-07 DIAGNOSIS — L75.0 URINARY BODY ODOR: ICD-10-CM

## 2025-01-07 LAB
BILIRUBIN, URINE, POC: NEGATIVE
BLOOD URINE, POC: NEGATIVE
GLUCOSE URINE, POC: NORMAL
KETONES, URINE, POC: NEGATIVE
LEUKOCYTE ESTERASE, URINE, POC: NEGATIVE
NITRITE, URINE, POC: NEGATIVE
PH, URINE, POC: 5.5 (ref 4.6–8)
PROTEIN,URINE, POC: NEGATIVE
SPECIFIC GRAVITY, URINE, POC: 1.02 (ref 1–1.03)
URINALYSIS CLARITY, POC: CLEAR
URINALYSIS COLOR, POC: YELLOW
UROBILINOGEN, POC: NORMAL

## 2025-01-07 PROCEDURE — 3074F SYST BP LT 130 MM HG: CPT | Performed by: FAMILY MEDICINE

## 2025-01-07 PROCEDURE — 1123F ACP DISCUSS/DSCN MKR DOCD: CPT | Performed by: FAMILY MEDICINE

## 2025-01-07 PROCEDURE — 99213 OFFICE O/P EST LOW 20 MIN: CPT | Performed by: FAMILY MEDICINE

## 2025-01-07 PROCEDURE — 1160F RVW MEDS BY RX/DR IN RCRD: CPT | Performed by: FAMILY MEDICINE

## 2025-01-07 PROCEDURE — G2211 COMPLEX E/M VISIT ADD ON: HCPCS | Performed by: FAMILY MEDICINE

## 2025-01-07 PROCEDURE — 3078F DIAST BP <80 MM HG: CPT | Performed by: FAMILY MEDICINE

## 2025-01-07 PROCEDURE — 81003 URINALYSIS AUTO W/O SCOPE: CPT | Performed by: FAMILY MEDICINE

## 2025-01-07 PROCEDURE — 1159F MED LIST DOCD IN RCRD: CPT | Performed by: FAMILY MEDICINE

## 2025-01-07 SDOH — ECONOMIC STABILITY: FOOD INSECURITY: WITHIN THE PAST 12 MONTHS, YOU WORRIED THAT YOUR FOOD WOULD RUN OUT BEFORE YOU GOT MONEY TO BUY MORE.: NEVER TRUE

## 2025-01-07 SDOH — ECONOMIC STABILITY: INCOME INSECURITY: HOW HARD IS IT FOR YOU TO PAY FOR THE VERY BASICS LIKE FOOD, HOUSING, MEDICAL CARE, AND HEATING?: NOT HARD AT ALL

## 2025-01-07 SDOH — ECONOMIC STABILITY: FOOD INSECURITY: WITHIN THE PAST 12 MONTHS, THE FOOD YOU BOUGHT JUST DIDN'T LAST AND YOU DIDN'T HAVE MONEY TO GET MORE.: NEVER TRUE

## 2025-01-07 SDOH — ECONOMIC STABILITY: TRANSPORTATION INSECURITY
IN THE PAST 12 MONTHS, HAS LACK OF TRANSPORTATION KEPT YOU FROM MEETINGS, WORK, OR FROM GETTING THINGS NEEDED FOR DAILY LIVING?: NO

## 2025-01-07 ASSESSMENT — PATIENT HEALTH QUESTIONNAIRE - PHQ9
SUM OF ALL RESPONSES TO PHQ QUESTIONS 1-9: 0
1. LITTLE INTEREST OR PLEASURE IN DOING THINGS: NOT AT ALL
SUM OF ALL RESPONSES TO PHQ QUESTIONS 1-9: 0
2. FEELING DOWN, DEPRESSED OR HOPELESS: NOT AT ALL
SUM OF ALL RESPONSES TO PHQ9 QUESTIONS 1 & 2: 0
SUM OF ALL RESPONSES TO PHQ QUESTIONS 1-9: 0
SUM OF ALL RESPONSES TO PHQ QUESTIONS 1-9: 0

## 2025-01-07 NOTE — PROGRESS NOTES
[facility]  Chief Complaint   Patient presents with    Urinary odor     \"Sweet\"?denies dysuria or vaginal irritation     Tingling     Both hands when waking up x2 days    Legs occas when sitting for a while        History of Present Illness:   April D  is a 69 y.o. female       HPI:  Here for diabetes. Am 129 this am. Usually 90s! On Jardiance 3 months now.   Has to pay deductible for 2025 though.  Noticed urinary sweet swell but no other symptoms.   H/o carpal tunnel, having some symptoms again, worse in R hand. Only for past 2 days. Occasional legs tingle too when sitting for a while    Hemoglobin A1C   Date Value Ref Range Status   10/17/2024 7.8 (H) 4.0 - 5.6 % Final     Comment:     (NOTE)  HbA1C Interpretive Ranges  <5.7              Normal  5.7 - 6.4         Consider Prediabetes  >6.5              Consider Diabetes       Hemoglobin A1C, POC   Date Value Ref Range Status   04/29/2024 7.8 % Final         Health Maintenance  Health Maintenance Due   Topic Date Due    Respiratory Syncytial Virus (RSV) Pregnant or age 60 yrs+ (1 - Risk 60-74 years 1-dose series) Never done    Diabetic retinal exam  06/25/2020    COVID-19 Vaccine (3 - 2023-24 season) 09/01/2024    Diabetic Alb to Cr ratio (uACR) test  10/31/2024    Annual Wellness Visit (Medicare Advantage)  01/01/2025       Past Medical, Family, and Social History:     Past Medical History:   Diagnosis Date    Anxiety 01/19/2924    Asthma     Breast cancer (HCC)     Chronic back pain     Diabetes (HCC)     Hx of seasonal allergies     Hypertension     Osteoarthritis 2010      Past Surgical History:   Procedure Laterality Date    APPENDECTOMY      BIOPSY BREAST      X3    BREAST SURGERY      bilateral breast lumpectomy    COLONOSCOPY N/A 1/6/2023    COLONOSCOPY performed by Matt Younger MD at [facility] ENDOSCOPY    HYSTERECTOMY (CERVIX STATUS UNKNOWN)      HYSTERECTOMY, TOTAL ABDOMINAL (CERVIX REMOVED)  1976    TONSILLECTOMY         Current

## 2025-01-07 NOTE — PROGRESS NOTES
\"Have you been to the ER, urgent care clinic since your last visit?  Hospitalized since your last visit?\"    NO    “Have you seen or consulted any other health care providers outside of [facility] since your last visit?”    NO            Click Here for Release of Records Request     Health Maintenance Due   Topic Date Due    Respiratory Syncytial Virus (RSV) Pregnant or age 60 yrs+ (1 - Risk 60-74 years 1-dose series) Never done    Diabetic retinal exam  06/25/2020    COVID-19 Vaccine (3 - 2023-24 season) 09/01/2024    Diabetic Alb to Cr ratio (uACR) test  10/31/2024    Annual Wellness Visit (Medicare Advantage)  01/01/2025

## 2025-01-07 NOTE — ASSESSMENT & PLAN NOTE
Chronic, at goal (stable), continue current treatment plan  lab results and schedule of future lab studies reviewed with patient, and reviewed medications and side effects in detail  Orders:    AMB POC URINALYSIS DIP STICK AUTO W/O MICRO    Albumin/Creatinine Ratio, Urine; Future    Hemoglobin A1C; Future    Hemoglobin A1C    Albumin/Creatinine Ratio, Urine

## 2025-01-08 DIAGNOSIS — E11.8 TYPE 2 DIABETES MELLITUS WITH UNSPECIFIED COMPLICATIONS (HCC): Primary | ICD-10-CM

## 2025-01-08 LAB
CREAT UR-MCNC: 61.6 MG/DL
MICROALBUMIN UR-MCNC: 0.6 MG/DL
MICROALBUMIN/CREAT UR-RTO: 10 MG/G (ref 0–30)
SPECIMEN HOLD: NORMAL

## 2025-02-07 DIAGNOSIS — E11.8 TYPE 2 DIABETES MELLITUS WITH UNSPECIFIED COMPLICATIONS (HCC): ICD-10-CM

## 2025-02-08 LAB
EST. AVERAGE GLUCOSE BLD GHB EST-MCNC: 146 MG/DL
HBA1C MFR BLD: 6.7 % (ref 4–5.6)

## 2025-03-06 ENCOUNTER — OFFICE VISIT (OUTPATIENT)
Facility: CLINIC | Age: 70
End: 2025-03-06

## 2025-03-06 VITALS
SYSTOLIC BLOOD PRESSURE: 116 MMHG | HEIGHT: 64 IN | BODY MASS INDEX: 31.92 KG/M2 | DIASTOLIC BLOOD PRESSURE: 70 MMHG | RESPIRATION RATE: 18 BRPM | OXYGEN SATURATION: 100 % | TEMPERATURE: 98.5 F | WEIGHT: 187 LBS | HEART RATE: 58 BPM

## 2025-03-06 DIAGNOSIS — L30.9 PERIORBITAL DERMATITIS: Primary | ICD-10-CM

## 2025-03-06 SDOH — ECONOMIC STABILITY: FOOD INSECURITY: WITHIN THE PAST 12 MONTHS, YOU WORRIED THAT YOUR FOOD WOULD RUN OUT BEFORE YOU GOT MONEY TO BUY MORE.: NEVER TRUE

## 2025-03-06 SDOH — ECONOMIC STABILITY: FOOD INSECURITY: WITHIN THE PAST 12 MONTHS, THE FOOD YOU BOUGHT JUST DIDN'T LAST AND YOU DIDN'T HAVE MONEY TO GET MORE.: NEVER TRUE

## 2025-03-07 NOTE — PROGRESS NOTES
Chief Complaint   Patient presents with    Skin Problem     Reports itching to face, watery eyes. Reports since January 2025.          \"Have you been to the ER, urgent care clinic since your last visit?  Hospitalized since your last visit?\"    NO    “Have you seen or consulted any other health care providers outside of [facility] since your last visit?”    NO            Click Here for Release of Records Request     Health Maintenance Due   Topic Date Due    Respiratory Syncytial Virus (RSV) Pregnant or age 60 yrs+ (1 - Risk 60-74 years 1-dose series) Never done    Diabetic retinal exam  06/25/2020    COVID-19 Vaccine (3 - 2024-25 season) 09/01/2024    Annual Wellness Visit (Medicare Advantage)  01/01/2025       
I reviewed with the resident the medical history and the resident's findings on the physical examination.  I discussed with the resident the patient's diagnosis and concur with the plan.     Pat Atkinson MD 3/7/2025   
detergent and soaps  - OK to use home Cereve as facial moisturizer    - Advised use of Zyrtec or Allegra in place of Claritin, patient declines  - Continue Claritin  - Can consider Tacrolimus if failed the above      Follow up:   Follow up as needed for worsening or persistent symptoms    Pt was discussed with Dr Demetrio MD (attending physician).      Yin Preutt DO  Resident [facility]  03/06/25    Patient's past medical history, including but not limited to problem list, allergies, medications, social history, family history, and surgical history reviewed. I have reviewed pertinent labs results and other data. We discussed the expected course, resolution and complications of the diagnosis(es) in detail.  Medication risks, benefits, costs, interactions, and alternatives were discussed as indicated.  I advised her to contact the office if her condition worsens, changes or fails to improve as anticipated. She expressed understanding with the diagnosis(es) and plan.      Please note that this dictation may have been completed with Dragon, the computer voice recognition software.  Quite often unanticipated grammatical, syntax, homophones, and other interpretive errors are inadvertently transcribed by the computer software. Please disregard these errors.  Please excuse any errors that have escaped final proofreading.

## 2025-04-21 ENCOUNTER — OFFICE VISIT (OUTPATIENT)
Facility: CLINIC | Age: 70
End: 2025-04-21
Payer: MEDICARE

## 2025-04-21 VITALS
HEART RATE: 75 BPM | WEIGHT: 189 LBS | SYSTOLIC BLOOD PRESSURE: 126 MMHG | BODY MASS INDEX: 32.27 KG/M2 | TEMPERATURE: 98.6 F | HEIGHT: 64 IN | OXYGEN SATURATION: 95 % | RESPIRATION RATE: 16 BRPM | DIASTOLIC BLOOD PRESSURE: 70 MMHG

## 2025-04-21 DIAGNOSIS — E78.2 MIXED HYPERLIPIDEMIA: ICD-10-CM

## 2025-04-21 DIAGNOSIS — M62.838 TRAPEZIUS MUSCLE SPASM: ICD-10-CM

## 2025-04-21 DIAGNOSIS — I10 HYPERTENSION, UNSPECIFIED TYPE: ICD-10-CM

## 2025-04-21 DIAGNOSIS — Z00.00 MEDICARE ANNUAL WELLNESS VISIT, SUBSEQUENT: ICD-10-CM

## 2025-04-21 DIAGNOSIS — Z79.899 LONG TERM USE OF DRUG: ICD-10-CM

## 2025-04-21 DIAGNOSIS — E11.8 TYPE 2 DIABETES MELLITUS WITH UNSPECIFIED COMPLICATIONS (HCC): Primary | ICD-10-CM

## 2025-04-21 PROCEDURE — 1123F ACP DISCUSS/DSCN MKR DOCD: CPT | Performed by: FAMILY MEDICINE

## 2025-04-21 PROCEDURE — 3074F SYST BP LT 130 MM HG: CPT | Performed by: FAMILY MEDICINE

## 2025-04-21 PROCEDURE — G0439 PPPS, SUBSEQ VISIT: HCPCS | Performed by: FAMILY MEDICINE

## 2025-04-21 PROCEDURE — 3044F HG A1C LEVEL LT 7.0%: CPT | Performed by: FAMILY MEDICINE

## 2025-04-21 PROCEDURE — 1159F MED LIST DOCD IN RCRD: CPT | Performed by: FAMILY MEDICINE

## 2025-04-21 PROCEDURE — G2211 COMPLEX E/M VISIT ADD ON: HCPCS | Performed by: FAMILY MEDICINE

## 2025-04-21 PROCEDURE — 99214 OFFICE O/P EST MOD 30 MIN: CPT | Performed by: FAMILY MEDICINE

## 2025-04-21 PROCEDURE — 1160F RVW MEDS BY RX/DR IN RCRD: CPT | Performed by: FAMILY MEDICINE

## 2025-04-21 PROCEDURE — 3078F DIAST BP <80 MM HG: CPT | Performed by: FAMILY MEDICINE

## 2025-04-21 RX ORDER — ATORVASTATIN CALCIUM 40 MG/1
40 TABLET, FILM COATED ORAL DAILY
Qty: 100 TABLET | Refills: 2 | Status: SHIPPED | OUTPATIENT
Start: 2025-04-21

## 2025-04-21 RX ORDER — LISINOPRIL AND HYDROCHLOROTHIAZIDE 12.5; 2 MG/1; MG/1
2 TABLET ORAL DAILY
Qty: 200 TABLET | Refills: 2 | Status: SHIPPED | OUTPATIENT
Start: 2025-04-21

## 2025-04-21 RX ORDER — CYCLOBENZAPRINE HCL 5 MG
5 TABLET ORAL NIGHTLY PRN
Qty: 30 TABLET | Refills: 0 | Status: SHIPPED | OUTPATIENT
Start: 2025-04-21

## 2025-04-21 ASSESSMENT — PATIENT HEALTH QUESTIONNAIRE - PHQ9
2. FEELING DOWN, DEPRESSED OR HOPELESS: NOT AT ALL
1. LITTLE INTEREST OR PLEASURE IN DOING THINGS: NOT AT ALL
SUM OF ALL RESPONSES TO PHQ QUESTIONS 1-9: 2
4. FEELING TIRED OR HAVING LITTLE ENERGY: NOT AT ALL
6. FEELING BAD ABOUT YOURSELF - OR THAT YOU ARE A FAILURE OR HAVE LET YOURSELF OR YOUR FAMILY DOWN: NOT AT ALL
SUM OF ALL RESPONSES TO PHQ QUESTIONS 1-9: 2
7. TROUBLE CONCENTRATING ON THINGS, SUCH AS READING THE NEWSPAPER OR WATCHING TELEVISION: SEVERAL DAYS
8. MOVING OR SPEAKING SO SLOWLY THAT OTHER PEOPLE COULD HAVE NOTICED. OR THE OPPOSITE, BEING SO FIGETY OR RESTLESS THAT YOU HAVE BEEN MOVING AROUND A LOT MORE THAN USUAL: NOT AT ALL
9. THOUGHTS THAT YOU WOULD BE BETTER OFF DEAD, OR OF HURTING YOURSELF: NOT AT ALL
SUM OF ALL RESPONSES TO PHQ QUESTIONS 1-9: 2
3. TROUBLE FALLING OR STAYING ASLEEP: SEVERAL DAYS
SUM OF ALL RESPONSES TO PHQ QUESTIONS 1-9: 2
5. POOR APPETITE OR OVEREATING: NOT AT ALL
10. IF YOU CHECKED OFF ANY PROBLEMS, HOW DIFFICULT HAVE THESE PROBLEMS MADE IT FOR YOU TO DO YOUR WORK, TAKE CARE OF THINGS AT HOME, OR GET ALONG WITH OTHER PEOPLE: NOT DIFFICULT AT ALL

## 2025-04-21 ASSESSMENT — LIFESTYLE VARIABLES
HOW MANY STANDARD DRINKS CONTAINING ALCOHOL DO YOU HAVE ON A TYPICAL DAY: PATIENT DOES NOT DRINK
HOW OFTEN DO YOU HAVE A DRINK CONTAINING ALCOHOL: NEVER

## 2025-04-21 ASSESSMENT — ENCOUNTER SYMPTOMS
SHORTNESS OF BREATH: 0
COUGH: 0
BACK PAIN: 1
WHEEZING: 0

## 2025-04-21 NOTE — PROGRESS NOTES
[facility]  Chief Complaint   Patient presents with    Medicare AWV    Follow-up Chronic Condition       History of Present Illness:   April D  is a 70 y.o. female       HPI:  Here for diabetes, htn, HLD. Taking meds as directed.  On Jardiance, able to afford it. No urinary symptoms.   Rash better on face, no longer using HC cream.   Has eye appt this summer.   Has occ back pain, uses flexeril infrequently. Requests refill.       Hemoglobin A1C   Date Value Ref Range Status   02/07/2025 6.7 (H) 4.0 - 5.6 % Final     Comment:     (NOTE)  HbA1C Interpretive Ranges  <5.7              Normal  5.7 - 6.4         Consider Prediabetes  >6.5              Consider Diabetes       Hemoglobin A1C, POC   Date Value Ref Range Status   04/29/2024 7.8 % Final         Health Maintenance  Health Maintenance Due   Topic Date Due    Respiratory Syncytial Virus (RSV) Pregnant or age 60 yrs+ (1 - Risk 60-74 years 1-dose series) Never done    Diabetic retinal exam  06/25/2020    COVID-19 Vaccine (3 - 2024-25 season) 09/01/2024    Annual Wellness Visit (Medicare Advantage)  01/01/2025       Past Medical, Family, and Social History:     Past Medical History:   Diagnosis Date    Anxiety 01/19/2924    Asthma     Breast cancer     Chronic back pain     Diabetes (HCC)     Hx of seasonal allergies     Hypertension     Osteoarthritis 2010      Past Surgical History:   Procedure Laterality Date    APPENDECTOMY      BIOPSY BREAST      X3    BREAST SURGERY      bilateral breast lumpectomy    COLONOSCOPY N/A 1/6/2023    COLONOSCOPY performed by Matt Younger MD at [facility] ENDOSCOPY    HYSTERECTOMY (CERVIX STATUS UNKNOWN)      HYSTERECTOMY, TOTAL ABDOMINAL (CERVIX REMOVED)  1976    TONSILLECTOMY         Current Outpatient Medications on File Prior to Visit   Medication Sig Dispense Refill    JARDIANCE 10 MG tablet TAKE 1 TABLET BY MOUTH DAILY 100 tablet 2    nystatin (MYCOSTATIN) 546770 UNIT/GM cream Apply topically 2 times daily 
TAKE 1 TABLET BY MOUTH DAILY Yes Maria Guadalupe Small MD   nystatin (MYCOSTATIN) 936304 UNIT/GM cream Apply topically 2 times daily x 7-10 days Yes Maria Guadalupe Small MD   Multiple Vitamins-Minerals (MULTIVITAMIN ADULTS 50+ PO) Take by mouth Yes Provider, MD Iris   albuterol sulfate HFA (PROVENTIL;VENTOLIN;PROAIR) 108 (90 Base) MCG/ACT inhaler Take 1 puff every 4 hours as needed for shortness of breath or persistent nighttime cough. Yes Automatic Reconciliation, Ar   albuterol (PROVENTIL) (2.5 MG/3ML) 0.083% nebulizer solution Inhale 3 mLs into the lungs every 4 hours as needed Yes Automatic Reconciliation, Ar   cyanocobalamin 500 MCG tablet Take 1 tablet by mouth daily Yes Automatic Reconciliation, Ar   fluticasone (FLONASE) 50 MCG/ACT nasal spray 2 sprays by Nasal route daily as needed Yes Automatic Reconciliation, Ar   loratadine (CLARITIN) 10 MG tablet Take 1 tablet by mouth daily Yes Automatic Reconciliation, Ar   naproxen (NAPROSYN) 250 MG tablet Take 1 tablet by mouth 2 times daily as needed Yes Automatic Reconciliation, Ar       CareTeam (Including outside providers/suppliers regularly involved in providing care):   Patient Care Team:  Maria Guadalupe Small MD as PCP - General  Maria Guadalupe Small MD as PCP [facility]     Recommendations for Preventive Services Due: see orders and patient instructions/AVS.  Recommended screening schedule for the next 5-10 years is provided to the patient in written form: see Patient Instructions/AVS.     Reviewed and updated this visit:  Tobacco  Allergies  Meds  Problems  Med Hx  Surg Hx  Fam Hx  Sexual   Hx

## 2025-04-21 NOTE — ASSESSMENT & PLAN NOTE
Chronic, at goal (stable), continue current treatment plan    Orders:    lisinopril-hydroCHLOROthiazide (PRINZIDE;ZESTORETIC) 20-12.5 MG per tablet; Take 2 tablets by mouth daily

## 2025-04-21 NOTE — ASSESSMENT & PLAN NOTE
Chronic, at goal (stable), continue current treatment plan,  lab results and schedule of future lab studies reviewed with patient, reviewed diet, exercise and weight control, and reviewed medications and side effects in detail    Orders:     DIABETES FOOT EXAM

## 2025-04-21 NOTE — ASSESSMENT & PLAN NOTE
Chronic, at goal (stable), continue current treatment plan  Lab Results   Component Value Date    LDL 50.6 10/17/2024       Orders:    atorvastatin (LIPITOR) 40 MG tablet; Take 1 tablet by mouth daily

## 2025-04-22 ENCOUNTER — RESULTS FOLLOW-UP (OUTPATIENT)
Facility: CLINIC | Age: 70
End: 2025-04-22

## 2025-04-22 LAB
ALBUMIN SERPL-MCNC: 3.9 G/DL (ref 3.5–5)
ALBUMIN/GLOB SERPL: 1.1 (ref 1.1–2.2)
ALP SERPL-CCNC: 85 U/L (ref 45–117)
ALT SERPL-CCNC: 28 U/L (ref 12–78)
ANION GAP SERPL CALC-SCNC: 8 MMOL/L (ref 2–12)
AST SERPL-CCNC: 23 U/L (ref 15–37)
BASOPHILS # BLD: 0.06 K/UL (ref 0–0.1)
BASOPHILS NFR BLD: 0.7 % (ref 0–1)
BILIRUB SERPL-MCNC: 0.4 MG/DL (ref 0.2–1)
BUN SERPL-MCNC: 27 MG/DL (ref 6–20)
BUN/CREAT SERPL: 26 (ref 12–20)
CALCIUM SERPL-MCNC: 9.7 MG/DL (ref 8.5–10.1)
CHLORIDE SERPL-SCNC: 105 MMOL/L (ref 97–108)
CO2 SERPL-SCNC: 27 MMOL/L (ref 21–32)
CREAT SERPL-MCNC: 1.04 MG/DL (ref 0.55–1.02)
DIFFERENTIAL METHOD BLD: ABNORMAL
EOSINOPHIL # BLD: 0.2 K/UL (ref 0–0.4)
EOSINOPHIL NFR BLD: 2.5 % (ref 0–7)
ERYTHROCYTE [DISTWIDTH] IN BLOOD BY AUTOMATED COUNT: 14.5 % (ref 11.5–14.5)
GLOBULIN SER CALC-MCNC: 3.6 G/DL (ref 2–4)
GLUCOSE SERPL-MCNC: 118 MG/DL (ref 65–100)
HCT VFR BLD AUTO: 44.1 % (ref 35–47)
HGB BLD-MCNC: 13.5 G/DL (ref 11.5–16)
IMM GRANULOCYTES # BLD AUTO: 0.03 K/UL (ref 0–0.04)
IMM GRANULOCYTES NFR BLD AUTO: 0.4 % (ref 0–0.5)
LYMPHOCYTES # BLD: 3.87 K/UL (ref 0.8–3.5)
LYMPHOCYTES NFR BLD: 47.7 % (ref 12–49)
MCH RBC QN AUTO: 28.2 PG (ref 26–34)
MCHC RBC AUTO-ENTMCNC: 30.6 G/DL (ref 30–36.5)
MCV RBC AUTO: 92.3 FL (ref 80–99)
MONOCYTES # BLD: 0.66 K/UL (ref 0–1)
MONOCYTES NFR BLD: 8.1 % (ref 5–13)
NEUTS SEG # BLD: 3.29 K/UL (ref 1.8–8)
NEUTS SEG NFR BLD: 40.6 % (ref 32–75)
NRBC # BLD: 0 K/UL (ref 0–0.01)
NRBC BLD-RTO: 0 PER 100 WBC
PLATELET # BLD AUTO: 310 K/UL (ref 150–400)
PMV BLD AUTO: 11.4 FL (ref 8.9–12.9)
POTASSIUM SERPL-SCNC: 4.3 MMOL/L (ref 3.5–5.1)
PROT SERPL-MCNC: 7.5 G/DL (ref 6.4–8.2)
RBC # BLD AUTO: 4.78 M/UL (ref 3.8–5.2)
SODIUM SERPL-SCNC: 140 MMOL/L (ref 136–145)
WBC # BLD AUTO: 8.1 K/UL (ref 3.6–11)

## 2025-08-07 ENCOUNTER — OFFICE VISIT (OUTPATIENT)
Facility: CLINIC | Age: 70
End: 2025-08-07

## 2025-08-07 VITALS
HEIGHT: 64 IN | HEART RATE: 59 BPM | TEMPERATURE: 98.1 F | WEIGHT: 192 LBS | OXYGEN SATURATION: 98 % | DIASTOLIC BLOOD PRESSURE: 67 MMHG | BODY MASS INDEX: 32.78 KG/M2 | RESPIRATION RATE: 16 BRPM | SYSTOLIC BLOOD PRESSURE: 127 MMHG

## 2025-08-07 DIAGNOSIS — K21.9 GASTROESOPHAGEAL REFLUX DISEASE WITHOUT ESOPHAGITIS: ICD-10-CM

## 2025-08-07 DIAGNOSIS — R13.10 DYSPHAGIA, UNSPECIFIED TYPE: ICD-10-CM

## 2025-08-07 DIAGNOSIS — E11.8 TYPE 2 DIABETES MELLITUS WITH UNSPECIFIED COMPLICATIONS (HCC): Primary | ICD-10-CM

## 2025-08-07 DIAGNOSIS — I10 PRIMARY HYPERTENSION: ICD-10-CM

## 2025-08-07 LAB — HBA1C MFR BLD: 7 %

## 2025-08-07 ASSESSMENT — ENCOUNTER SYMPTOMS
ABDOMINAL PAIN: 0
BLOOD IN STOOL: 0
SHORTNESS OF BREATH: 0
NAUSEA: 0
VOMITING: 0
DIARRHEA: 0
CONSTIPATION: 0

## 2025-08-21 ENCOUNTER — PATIENT MESSAGE (OUTPATIENT)
Facility: CLINIC | Age: 70
End: 2025-08-21

## 2025-08-21 DIAGNOSIS — E11.8 TYPE 2 DIABETES MELLITUS WITH UNSPECIFIED COMPLICATIONS (HCC): Primary | ICD-10-CM

## 2025-08-26 ENCOUNTER — OFFICE VISIT (OUTPATIENT)
Facility: CLINIC | Age: 70
End: 2025-08-26

## 2025-08-26 VITALS
OXYGEN SATURATION: 98 % | HEART RATE: 57 BPM | DIASTOLIC BLOOD PRESSURE: 76 MMHG | TEMPERATURE: 98.2 F | RESPIRATION RATE: 16 BRPM | HEIGHT: 64 IN | BODY MASS INDEX: 33.63 KG/M2 | WEIGHT: 197 LBS | SYSTOLIC BLOOD PRESSURE: 127 MMHG

## 2025-08-26 DIAGNOSIS — E11.9 TYPE 2 DIABETES MELLITUS WITHOUT COMPLICATION, WITHOUT LONG-TERM CURRENT USE OF INSULIN (HCC): Primary | ICD-10-CM

## (undated) DEVICE — KIT COLON W/ 1.1OZ LUB AND 2 END

## (undated) DEVICE — CATH IV AUTOGRD BC BLU 22GA 25 -- INSYTE

## (undated) DEVICE — BAG BELONG PT PERS CLEAR HANDL

## (undated) DEVICE — Device

## (undated) DEVICE — SOLIDIFIER MEDC 1200ML -- CONVERT TO 356117

## (undated) DEVICE — BLUNTFILL WITH FILTER: Brand: MONOJECT

## (undated) DEVICE — 1200 GUARD II KIT W/5MM TUBE W/O VAC TUBE: Brand: GUARDIAN

## (undated) DEVICE — SYRINGE MED 3ML CLR PLAS STD N CTRL LUERLOCK TIP DISP

## (undated) DEVICE — ELECTRODE,RADIOTRANSLUCENT,FOAM,3PK: Brand: MEDLINE

## (undated) DEVICE — SYR 5ML 1/5 GRAD LL NSAF LF --

## (undated) DEVICE — CANN NASAL O2 CAPNOGRAPHY AD -- FILTERLINE

## (undated) DEVICE — BLUNTFILL: Brand: MONOJECT

## (undated) DEVICE — BASIN EMSIS 16OZ GRAPHITE PLAS KID SHP MOLD GRAD FOR ORAL

## (undated) DEVICE — SET ADMIN 16ML TBNG L100IN 2 Y INJ SITE IV PIGGY BK DISP (ORDER IN MULIPLES OF 48)